# Patient Record
Sex: FEMALE | Race: WHITE | NOT HISPANIC OR LATINO | Employment: FULL TIME | URBAN - METROPOLITAN AREA
[De-identification: names, ages, dates, MRNs, and addresses within clinical notes are randomized per-mention and may not be internally consistent; named-entity substitution may affect disease eponyms.]

---

## 2018-10-08 ENCOUNTER — APPOINTMENT (OUTPATIENT)
Dept: GENERAL RADIOLOGY | Facility: HOSPITAL | Age: 53
End: 2018-10-08

## 2018-10-08 ENCOUNTER — HOSPITAL ENCOUNTER (EMERGENCY)
Facility: HOSPITAL | Age: 53
Discharge: HOME | End: 2018-10-08
Attending: EMERGENCY MEDICINE | Admitting: EMERGENCY MEDICINE

## 2018-10-08 ENCOUNTER — APPOINTMENT (OUTPATIENT)
Dept: CT IMAGING | Facility: HOSPITAL | Age: 53
End: 2018-10-08

## 2018-10-08 VITALS
SYSTOLIC BLOOD PRESSURE: 171 MMHG | HEIGHT: 65 IN | WEIGHT: 155 LBS | OXYGEN SATURATION: 93 % | HEART RATE: 53 BPM | TEMPERATURE: 97.6 F | RESPIRATION RATE: 14 BRPM | BODY MASS INDEX: 25.83 KG/M2 | DIASTOLIC BLOOD PRESSURE: 104 MMHG

## 2018-10-08 DIAGNOSIS — M54.6 ACUTE LEFT-SIDED THORACIC BACK PAIN: Primary | ICD-10-CM

## 2018-10-08 LAB
ALBUMIN SERPL-MCNC: 4.6 G/DL (ref 3.5–5.2)
ALBUMIN/GLOB SERPL: 1.6 G/DL
ALP SERPL-CCNC: 99 U/L (ref 39–117)
ALT SERPL W P-5'-P-CCNC: 40 U/L (ref 1–33)
ANION GAP SERPL CALCULATED.3IONS-SCNC: 14.9 MMOL/L
AST SERPL-CCNC: 29 U/L (ref 1–32)
BASOPHILS # BLD AUTO: 0.08 10*3/MM3 (ref 0–0.2)
BASOPHILS NFR BLD AUTO: 1 % (ref 0–1.5)
BILIRUB SERPL-MCNC: 0.5 MG/DL (ref 0.1–1.2)
BUN BLD-MCNC: 17 MG/DL (ref 6–20)
BUN/CREAT SERPL: 22.1 (ref 7–25)
CALCIUM SPEC-SCNC: 9.7 MG/DL (ref 8.6–10.5)
CHLORIDE SERPL-SCNC: 104 MMOL/L (ref 98–107)
CO2 SERPL-SCNC: 22.1 MMOL/L (ref 22–29)
CREAT BLD-MCNC: 0.77 MG/DL (ref 0.57–1)
D DIMER PPP FEU-MCNC: 2.53 MCGFEU/ML (ref 0–0.49)
DEPRECATED RDW RBC AUTO: 42 FL (ref 37–54)
EOSINOPHIL # BLD AUTO: 0.33 10*3/MM3 (ref 0–0.7)
EOSINOPHIL NFR BLD AUTO: 4.2 % (ref 0.3–6.2)
ERYTHROCYTE [DISTWIDTH] IN BLOOD BY AUTOMATED COUNT: 12.5 % (ref 11.7–13)
GFR SERPL CREATININE-BSD FRML MDRD: 78 ML/MIN/1.73
GLOBULIN UR ELPH-MCNC: 2.8 GM/DL
GLUCOSE BLD-MCNC: 93 MG/DL (ref 65–99)
HCT VFR BLD AUTO: 45.5 % (ref 35.6–45.5)
HGB BLD-MCNC: 15.3 G/DL (ref 11.9–15.5)
HOLD SPECIMEN: NORMAL
HOLD SPECIMEN: NORMAL
IMM GRANULOCYTES # BLD: 0.03 10*3/MM3 (ref 0–0.03)
IMM GRANULOCYTES NFR BLD: 0.4 % (ref 0–0.5)
LYMPHOCYTES # BLD AUTO: 2.68 10*3/MM3 (ref 0.9–4.8)
LYMPHOCYTES NFR BLD AUTO: 34.5 % (ref 19.6–45.3)
MCH RBC QN AUTO: 31 PG (ref 26.9–32)
MCHC RBC AUTO-ENTMCNC: 33.6 G/DL (ref 32.4–36.3)
MCV RBC AUTO: 92.1 FL (ref 80.5–98.2)
MONOCYTES # BLD AUTO: 0.34 10*3/MM3 (ref 0.2–1.2)
MONOCYTES NFR BLD AUTO: 4.4 % (ref 5–12)
NEUTROPHILS # BLD AUTO: 4.34 10*3/MM3 (ref 1.9–8.1)
NEUTROPHILS NFR BLD AUTO: 55.9 % (ref 42.7–76)
PLATELET # BLD AUTO: 207 10*3/MM3 (ref 140–500)
PMV BLD AUTO: 11.1 FL (ref 6–12)
POTASSIUM BLD-SCNC: 4 MMOL/L (ref 3.5–5.2)
PROT SERPL-MCNC: 7.4 G/DL (ref 6–8.5)
RBC # BLD AUTO: 4.94 10*6/MM3 (ref 3.9–5.2)
SODIUM BLD-SCNC: 141 MMOL/L (ref 136–145)
TROPONIN T SERPL-MCNC: <0.01 NG/ML (ref 0–0.03)
WBC NRBC COR # BLD: 7.77 10*3/MM3 (ref 4.5–10.7)
WHOLE BLOOD HOLD SPECIMEN: NORMAL
WHOLE BLOOD HOLD SPECIMEN: NORMAL

## 2018-10-08 PROCEDURE — 96374 THER/PROPH/DIAG INJ IV PUSH: CPT

## 2018-10-08 PROCEDURE — 99283 EMERGENCY DEPT VISIT LOW MDM: CPT

## 2018-10-08 PROCEDURE — 84484 ASSAY OF TROPONIN QUANT: CPT | Performed by: PHYSICIAN ASSISTANT

## 2018-10-08 PROCEDURE — 93010 ELECTROCARDIOGRAM REPORT: CPT | Performed by: INTERNAL MEDICINE

## 2018-10-08 PROCEDURE — 96376 TX/PRO/DX INJ SAME DRUG ADON: CPT

## 2018-10-08 PROCEDURE — 25010000002 ONDANSETRON PER 1 MG: Performed by: EMERGENCY MEDICINE

## 2018-10-08 PROCEDURE — 85379 FIBRIN DEGRADATION QUANT: CPT | Performed by: PHYSICIAN ASSISTANT

## 2018-10-08 PROCEDURE — 25010000002 MORPHINE PER 10 MG: Performed by: EMERGENCY MEDICINE

## 2018-10-08 PROCEDURE — 25010000002 HYDROMORPHONE 1 MG/ML SOLUTION: Performed by: EMERGENCY MEDICINE

## 2018-10-08 PROCEDURE — 80053 COMPREHEN METABOLIC PANEL: CPT | Performed by: PHYSICIAN ASSISTANT

## 2018-10-08 PROCEDURE — 93005 ELECTROCARDIOGRAM TRACING: CPT | Performed by: PHYSICIAN ASSISTANT

## 2018-10-08 PROCEDURE — 0 IOPAMIDOL PER 1 ML: Performed by: EMERGENCY MEDICINE

## 2018-10-08 PROCEDURE — 96375 TX/PRO/DX INJ NEW DRUG ADDON: CPT

## 2018-10-08 PROCEDURE — 85025 COMPLETE CBC W/AUTO DIFF WBC: CPT | Performed by: PHYSICIAN ASSISTANT

## 2018-10-08 PROCEDURE — 71046 X-RAY EXAM CHEST 2 VIEWS: CPT

## 2018-10-08 PROCEDURE — 71275 CT ANGIOGRAPHY CHEST: CPT

## 2018-10-08 RX ORDER — ONDANSETRON 2 MG/ML
4 INJECTION INTRAMUSCULAR; INTRAVENOUS ONCE
Status: COMPLETED | OUTPATIENT
Start: 2018-10-08 | End: 2018-10-08

## 2018-10-08 RX ORDER — BACLOFEN 10 MG/1
10 TABLET ORAL 3 TIMES DAILY PRN
Qty: 30 TABLET | Refills: 0 | Status: SHIPPED | OUTPATIENT
Start: 2018-10-08 | End: 2022-02-17

## 2018-10-08 RX ORDER — HYDROCODONE BITARTRATE AND ACETAMINOPHEN 5; 325 MG/1; MG/1
1 TABLET ORAL EVERY 6 HOURS PRN
Qty: 20 TABLET | Refills: 0 | Status: SHIPPED | OUTPATIENT
Start: 2018-10-08 | End: 2022-02-17

## 2018-10-08 RX ORDER — OXYBUTYNIN CHLORIDE 5 MG/1
5 TABLET ORAL 3 TIMES DAILY
COMMUNITY
End: 2022-02-17

## 2018-10-08 RX ORDER — LISINOPRIL 20 MG/1
20 TABLET ORAL DAILY
COMMUNITY
End: 2022-02-17

## 2018-10-08 RX ADMIN — MORPHINE SULFATE 4 MG: 4 INJECTION INTRAVENOUS at 20:30

## 2018-10-08 RX ADMIN — ONDANSETRON 4 MG: 2 INJECTION INTRAMUSCULAR; INTRAVENOUS at 20:29

## 2018-10-08 RX ADMIN — IOPAMIDOL 95 ML: 755 INJECTION, SOLUTION INTRAVENOUS at 21:02

## 2018-10-08 RX ADMIN — HYDROMORPHONE HYDROCHLORIDE 1 MG: 1 INJECTION, SOLUTION INTRAMUSCULAR; INTRAVENOUS; SUBCUTANEOUS at 21:38

## 2018-10-08 RX ADMIN — ONDANSETRON 4 MG: 2 INJECTION INTRAMUSCULAR; INTRAVENOUS at 21:38

## 2018-10-08 NOTE — ED NOTES
Pt reports SOA and left side pain that radiates up to right shoulder blade. Pt states pain is a constant 8/10 but increases to 10/10. Pt alert and oriented. Family at bedside.     Chiquita Santana RN  10/08/18 5905

## 2018-10-09 NOTE — ED PROVIDER NOTES
" EMERGENCY DEPARTMENT ENCOUNTER    CHIEF COMPLAINT  Chief Complaint: Back pain  History given by: pt  History limited by: none  Room Number: 12/12  PMD: Clem Bush MD      HPI:  Pt is a 53 y.o. female who presents complaining of severe right back pain that has progressed to the right side of the abdomen worsened with breathing that started today around 1100 [place]. Pt reports she has chronic back pain    Duration:  today  Onset: gradual  Timing: constant  Location: right back  Radiation: into right side of abdomen  Quality: \"back pain\"  Intensity/Severity: severe  Progression: unchanged  Associated Symptoms: none  Aggravating Factors: worsened with breathing  Alleviating Factors: none  Previous Episodes: Pt reports she has chronic back pain  Treatment before arrival: none    PAST MEDICAL HISTORY  Active Ambulatory Problems     Diagnosis Date Noted   • No Active Ambulatory Problems     Resolved Ambulatory Problems     Diagnosis Date Noted   • No Resolved Ambulatory Problems     Past Medical History:   Diagnosis Date   • Arthritis    • Hypertension    • PE (pulmonary embolism)    • Pleurisy        PAST SURGICAL HISTORY  Past Surgical History:   Procedure Laterality Date   • CATARACT EXTRACTION     • HYSTERECTOMY     • LASIK     • SINUS SURGERY         FAMILY HISTORY  History reviewed. No pertinent family history.    SOCIAL HISTORY  Social History     Social History   • Marital status:      Spouse name: N/A   • Number of children: N/A   • Years of education: N/A     Occupational History   • Not on file.     Social History Main Topics   • Smoking status: Never Smoker   • Smokeless tobacco: Not on file   • Alcohol use No   • Drug use: No   • Sexual activity: Not on file     Other Topics Concern   • Not on file     Social History Narrative   • No narrative on file       ALLERGIES  Doxycycline and Sulfa antibiotics    REVIEW OF SYSTEMS  Review of Systems "   Constitutional: Negative for fever.   HENT: Negative for sore throat.    Eyes: Negative.    Respiratory: Positive for shortness of breath. Negative for cough.    Cardiovascular: Negative for chest pain.   Gastrointestinal: Negative for abdominal pain, diarrhea and vomiting.   Genitourinary: Negative for dysuria.   Musculoskeletal: Positive for back pain. Negative for neck pain.   Skin: Negative for rash.   Allergic/Immunologic: Negative.    Neurological: Negative for weakness, numbness and headaches.   Hematological: Negative.    Psychiatric/Behavioral: Negative.    All other systems reviewed and are negative.      PHYSICAL EXAM  ED Triage Vitals   Temp Heart Rate Resp BP SpO2   10/08/18 1701 10/08/18 1701 10/08/18 1701 10/08/18 1918 10/08/18 1701   98 °F (36.7 °C) 78 20 (!) 195/116 97 %      Temp src Heart Rate Source Patient Position BP Location FiO2 (%)   10/08/18 1701 10/08/18 1701 -- 10/08/18 1918 --   Tympanic Monitor  Right arm        Physical Exam   Constitutional: She is oriented to person, place, and time. No distress.   HENT:   Head: Normocephalic and atraumatic.   Eyes: Pupils are equal, round, and reactive to light. EOM are normal.   Neck: Normal range of motion. Neck supple.   Cardiovascular: Normal rate, regular rhythm and normal heart sounds.    Pulmonary/Chest: Effort normal and breath sounds normal. No respiratory distress.   Abdominal: Soft. There is no tenderness. There is no rebound and no guarding.   Musculoskeletal: Normal range of motion. She exhibits no edema.   Tenderness that is reproducible with palpation to the left interscapular area and under breast on the left.   Neurological: She is alert and oriented to person, place, and time. She has normal sensation and normal strength.   Skin: Skin is warm and dry. No rash noted.   Psychiatric: Mood and affect normal.   Nursing note and vitals reviewed.      LAB RESULTS  Lab Results (last 24 hours)     Procedure Component Value Units Date/Time     CBC & Differential [[id number]] Collected:  10/08/18 1924    Specimen:  Blood Updated:  10/08/18 1939    Narrative:       The following orders were created for panel order CBC & Differential.  Procedure                               Abnormality         Status                     ---------                               -----------         ------                     CBC Auto Differential[[id number]]        Abnormal            Final result                 Please view results for these tests on the individual orders.    Comprehensive Metabolic Panel [[id number]]  (Abnormal) Collected:  10/08/18 1924    Specimen:  Blood Updated:  10/08/18 1958     Glucose 93 mg/dL      BUN 17 mg/dL      Creatinine 0.77 mg/dL      Sodium 141 mmol/L      Potassium 4.0 mmol/L      Chloride 104 mmol/L      CO2 22.1 mmol/L      Calcium 9.7 mg/dL      Total Protein 7.4 g/dL      Albumin 4.60 g/dL      ALT (SGPT) 40 (H) U/L      AST (SGOT) 29 U/L      Alkaline Phosphatase 99 U/L      Total Bilirubin 0.5 mg/dL      eGFR Non African Amer 78 mL/min/1.73      Globulin 2.8 gm/dL      A/G Ratio 1.6 g/dL      BUN/Creatinine Ratio 22.1     Anion Gap 14.9 mmol/L     Troponin [[id number]]  (Normal) Collected:  10/08/18 1924    Specimen:  Blood Updated:  10/08/18 2003     Troponin T <0.010 ng/mL     Narrative:       Troponin T Reference Ranges:  Less than 0.03 ng/mL:    Negative for AMI  0.03 to 0.09 ng/mL:      Indeterminant for AMI  Greater than 0.09 ng/mL: Positive for AMI    D-dimer, Quantitative [[id number]]  (Abnormal) Collected:  10/08/18 1924    Specimen:  Blood Updated:  10/08/18 1951     D-Dimer, Quantitative 2.53 (H) MCGFEU/mL     Narrative:       The Stago D-Dimer test used in conjunction with a clinical pretest probability (PTP) assessment model, has been approved by the FDA to rule out the presence of venous thromboembolism (VTE) in outpatients suspected of deep venous thrombosis (DVT) or pulmonary embolism (PE).     CBC Auto Differential  "[[id number]]  (Abnormal) Collected:  10/08/18 1924    Specimen:  Blood Updated:  10/08/18 1939     WBC 7.77 10*3/mm3      RBC 4.94 10*6/mm3      Hemoglobin 15.3 g/dL      Hematocrit 45.5 %      MCV 92.1 fL      MCH 31.0 pg      MCHC 33.6 g/dL      RDW 12.5 %      RDW-SD 42.0 fl      MPV 11.1 fL      Platelets 207 10*3/mm3      Neutrophil % 55.9 %      Lymphocyte % 34.5 %      Monocyte % 4.4 (L) %      Eosinophil % 4.2 %      Basophil % 1.0 %      Immature Grans % 0.4 %      Neutrophils, Absolute 4.34 10*3/mm3      Lymphocytes, Absolute 2.68 10*3/mm3      Monocytes, Absolute 0.34 10*3/mm3      Eosinophils, Absolute 0.33 10*3/mm3      Basophils, Absolute 0.08 10*3/mm3      Immature Grans, Absolute 0.03 10*3/mm3           I ordered the above labs and reviewed the results    RADIOLOGY  CT Angiogram Chest With Contrast   Final Result   1. No active disease or pulmonary embolism       This report was finalized on 10/8/2018 9:14 PM by Ted Perez M.D.          XR Chest 2 View   Final Result   Small likely atelectasis or scar peripherally at the left   base. Borderline heart size. Tortuous aorta. Follow-up as clinically   indicated.       This report was finalized on 10/8/2018 7:36 PM by Dr. Mike Huynh M.D.               I ordered the above noted radiological studies. Interpreted by radiologist. Reviewed by me in PACS.       PROCEDURES  Procedures  EKG           EKG time: 1927  Rhythm/Rate: sinus rhythm rate of 54  P waves and FL: normal  QRS, axis: normal   ST and T waves: nonspecific ST changes in the inferior and lateral leads    Interpreted Contemporaneously by me, independently viewed  Similar compared to prior 3/20/2016      PROGRESS AND CONSULTS  ED Course as of Oct 08 2128   Mon Oct 08, 2018   1853 Left pleuritic chest \"spasms\" x 11am today. Hx of post op PE, no longer on Xarelto. Cough this am  [KA]      ED Course User Index  [KA] Mariama Richard PA     2123  Discussed all lab and test results and plan " to discharge with muscle relaxers. Pt understands and agrees with the plan. All questions have been answered.    2123  Ordered dilaudid for pain and zofran for nausea.    MEDICAL DECISION MAKING  Results were reviewed/discussed with the patient and they were also made aware of online access. Pt also made aware that some labs, such as cultures, will not be resulted during ER visit and follow up with PMD is necessary.     MDM  Number of Diagnoses or Management Options     Amount and/or Complexity of Data Reviewed  Clinical lab tests: ordered and reviewed (D Dimer 2.53, Negative troponin)  Tests in the radiology section of CPT®: ordered and reviewed (Chest XR - Small likely atelectasis or scar peripherally at the left base.  Chest CTA - Negative acute)  Tests in the medicine section of CPT®: ordered and reviewed (Refer to the procedure section of the note for EKG results)  Decide to obtain previous medical records or to obtain history from someone other than the patient: yes (EPIC)           DIAGNOSIS  Final diagnoses:   Acute left-sided thoracic back pain       DISPOSITION  DISCHARGE    Patient discharged in stable condition.    Reviewed implications of results, diagnosis, meds, responsibility to follow up, warning signs and symptoms of possible worsening, potential complications and reasons to return to ER.    Patient/Family voiced understanding of above instructions.    Discussed plan for discharge, as there is no emergent indication for admission. Patient referred to primary care provider for BP management due to today's BP. Pt/family is agreeable and understands need for follow up and repeat testing.  Pt is aware that discharge does not mean that nothing is wrong but it indicates no emergency is present that requires admission and they must continue care with follow-up as given below or physician of their choice.     FOLLOW-UP  Clem Bush MD  [address]  [facility]  [address]  [phone]    Call            Medication List      New Prescriptions    baclofen 10 MG tablet  Commonly known as:  LIORESAL  Take 1 tablet by mouth 3 (Three) Times a Day As Needed for Muscle Spasms.     HYDROcodone-acetaminophen 5-325 MG per tablet  Commonly known as:  NORCO  Take 1 tablet by mouth Every 6 (Six) Hours As Needed for Severe Pain .              Latest Documented Vital Signs:  As of 9:28 PM  BP- 179/99 HR- 78 Temp- 98 °F (36.7 °C) (Tympanic) O2 sat- 98%    --  Documentation assistance provided by yuliana Chapman for Dr Willis.  Information recorded by the scribe was done at my direction and has been verified and validated by me.          Glenna Chapman  10/08/18 2120       Evaristo Willis MD  10/17/18 9326

## 2019-01-05 ENCOUNTER — HOSPITAL ENCOUNTER (EMERGENCY)
Facility: HOSPITAL | Age: 54
Discharge: HOME | End: 2019-01-05
Attending: EMERGENCY MEDICINE | Admitting: EMERGENCY MEDICINE

## 2019-01-05 VITALS
SYSTOLIC BLOOD PRESSURE: 151 MMHG | HEART RATE: 65 BPM | DIASTOLIC BLOOD PRESSURE: 89 MMHG | BODY MASS INDEX: 24.91 KG/M2 | TEMPERATURE: 99.2 F | WEIGHT: 155 LBS | HEIGHT: 66 IN | OXYGEN SATURATION: 97 % | RESPIRATION RATE: 16 BRPM

## 2019-01-05 DIAGNOSIS — R19.7 DIARRHEA OF PRESUMED INFECTIOUS ORIGIN: Primary | ICD-10-CM

## 2019-01-05 DIAGNOSIS — E86.0 DEHYDRATION: ICD-10-CM

## 2019-01-05 LAB
ADV 40+41 DNA STL QL NAA+NON-PROBE: NOT DETECTED
ANION GAP SERPL CALCULATED.3IONS-SCNC: 12.1 MMOL/L
ASTRO TYP 1-8 RNA STL QL NAA+NON-PROBE: DETECTED
BASOPHILS # BLD AUTO: 0.07 10*3/MM3 (ref 0–0.2)
BASOPHILS NFR BLD AUTO: 1.1 % (ref 0–1.5)
BUN BLD-MCNC: 25 MG/DL (ref 6–20)
BUN/CREAT SERPL: 26.3 (ref 7–25)
C CAYETANENSIS DNA STL QL NAA+NON-PROBE: NOT DETECTED
C DIFF TOX GENS STL QL NAA+PROBE: NEGATIVE
CALCIUM SPEC-SCNC: 9.6 MG/DL (ref 8.6–10.5)
CAMPY SP DNA.DIARRHEA STL QL NAA+PROBE: NOT DETECTED
CHLORIDE SERPL-SCNC: 108 MMOL/L (ref 98–107)
CO2 SERPL-SCNC: 23.9 MMOL/L (ref 22–29)
CREAT BLD-MCNC: 0.95 MG/DL (ref 0.57–1)
CRYPTOSP STL CULT: NOT DETECTED
DEPRECATED RDW RBC AUTO: 44.2 FL (ref 37–54)
E COLI DNA SPEC QL NAA+PROBE: NOT DETECTED
E HISTOLYT AG STL-ACNC: NOT DETECTED
EAEC PAA PLAS AGGR+AATA ST NAA+NON-PRB: NOT DETECTED
EC STX1 + STX2 GENES STL NAA+PROBE: NOT DETECTED
EOSINOPHIL # BLD AUTO: 0.28 10*3/MM3 (ref 0–0.7)
EOSINOPHIL NFR BLD AUTO: 4.2 % (ref 0.3–6.2)
EPEC EAE GENE STL QL NAA+NON-PROBE: NOT DETECTED
ERYTHROCYTE [DISTWIDTH] IN BLOOD BY AUTOMATED COUNT: 13.2 % (ref 11.7–13)
ETEC LTA+ST1A+ST1B TOX ST NAA+NON-PROBE: NOT DETECTED
G LAMBLIA DNA SPEC QL NAA+PROBE: NOT DETECTED
GFR SERPL CREATININE-BSD FRML MDRD: 61 ML/MIN/1.73
GLUCOSE BLD-MCNC: 97 MG/DL (ref 65–99)
HCT VFR BLD AUTO: 48.5 % (ref 35.6–45.5)
HGB BLD-MCNC: 16.6 G/DL (ref 11.9–15.5)
IMM GRANULOCYTES # BLD AUTO: 0.03 10*3/MM3 (ref 0–0.03)
IMM GRANULOCYTES NFR BLD AUTO: 0.5 % (ref 0–0.5)
LYMPHOCYTES # BLD AUTO: 1.38 10*3/MM3 (ref 0.9–4.8)
LYMPHOCYTES NFR BLD AUTO: 20.8 % (ref 19.6–45.3)
MCH RBC QN AUTO: 31.4 PG (ref 26.9–32)
MCHC RBC AUTO-ENTMCNC: 34.2 G/DL (ref 32.4–36.3)
MCV RBC AUTO: 91.9 FL (ref 80.5–98.2)
MONOCYTES # BLD AUTO: 0.58 10*3/MM3 (ref 0.2–1.2)
MONOCYTES NFR BLD AUTO: 8.7 % (ref 5–12)
NEUTROPHILS # BLD AUTO: 4.32 10*3/MM3 (ref 1.9–8.1)
NEUTROPHILS NFR BLD AUTO: 65.2 % (ref 42.7–76)
NOROVIRUS GI+II RNA STL QL NAA+NON-PROBE: NOT DETECTED
P SHIGELLOIDES DNA STL QL NAA+NON-PROBE: NOT DETECTED
PLATELET # BLD AUTO: 217 10*3/MM3 (ref 140–500)
PMV BLD AUTO: 11.1 FL (ref 6–12)
POTASSIUM BLD-SCNC: 4.7 MMOL/L (ref 3.5–5.2)
RBC # BLD AUTO: 5.28 10*6/MM3 (ref 3.9–5.2)
RV RNA STL NAA+PROBE: NOT DETECTED
SALMONELLA DNA SPEC QL NAA+PROBE: NOT DETECTED
SAPO I+II+IV+V RNA STL QL NAA+NON-PROBE: NOT DETECTED
SHIGELLA SP+EIEC IPAH STL QL NAA+PROBE: NOT DETECTED
SODIUM BLD-SCNC: 144 MMOL/L (ref 136–145)
V CHOLERAE DNA SPEC QL NAA+PROBE: NOT DETECTED
VIBRIO DNA SPEC NAA+PROBE: NOT DETECTED
WBC NRBC COR # BLD: 6.63 10*3/MM3 (ref 4.5–10.7)
YERSINIA STL CULT: NOT DETECTED

## 2019-01-05 PROCEDURE — 99283 EMERGENCY DEPT VISIT LOW MDM: CPT

## 2019-01-05 PROCEDURE — 96360 HYDRATION IV INFUSION INIT: CPT

## 2019-01-05 PROCEDURE — 87493 C DIFF AMPLIFIED PROBE: CPT | Performed by: EMERGENCY MEDICINE

## 2019-01-05 PROCEDURE — 80048 BASIC METABOLIC PNL TOTAL CA: CPT | Performed by: EMERGENCY MEDICINE

## 2019-01-05 PROCEDURE — 85025 COMPLETE CBC W/AUTO DIFF WBC: CPT | Performed by: EMERGENCY MEDICINE

## 2019-01-05 PROCEDURE — 87507 IADNA-DNA/RNA PROBE TQ 12-25: CPT | Performed by: EMERGENCY MEDICINE

## 2019-01-05 RX ORDER — ONDANSETRON 4 MG/1
4 TABLET, ORALLY DISINTEGRATING ORAL EVERY 8 HOURS PRN
Qty: 15 TABLET | Refills: 0 | Status: SHIPPED | OUTPATIENT
Start: 2019-01-05 | End: 2019-01-10

## 2019-01-05 RX ORDER — SODIUM CHLORIDE 0.9 % (FLUSH) 0.9 %
10 SYRINGE (ML) INJECTION AS NEEDED
Status: DISCONTINUED | OUTPATIENT
Start: 2019-01-05 | End: 2019-01-05 | Stop reason: HOSPADM

## 2019-01-05 RX ADMIN — SODIUM CHLORIDE, POTASSIUM CHLORIDE, SODIUM LACTATE AND CALCIUM CHLORIDE 1000 ML: 600; 310; 30; 20 INJECTION, SOLUTION INTRAVENOUS at 10:03

## 2019-01-05 NOTE — ED NOTES
Spoke with lab about pending stool specimen.  Lab called and inquired about whether specimen was indeed stool, they were informed that it indeed was.  Lab told ED staff that the machine rejected the specimen and that current specimen cannot be run, and a recollect needed to be done. Jose Alfredo COBB notified.      Sumeet Cope RN  01/05/19 6811

## 2019-01-05 NOTE — ED PROVIDER NOTES
" EMERGENCY DEPARTMENT ENCOUNTER    Room Number:  01/01  Date seen:  1/5/2019  Time seen: 9:35 AM  PCP: Clem Bush MD  Historian: Pt      HPI:  Chief Complaint: diarrhea    Context: Emy Medina is a 53 y.o. female who presents to the ED c/o diarrhea since 2 days ago. Diarrhea has somewhat improved, but still present. Admits \"low grade\" fever on Thursday, indigestion, and occasional nausea. She denies vomiting. Currently taking Cipro for UTI which she began after diarrhea began on Thursday. Pt was not evaluated for UTI, but was rx called in based on her hx. Pt believed she had UTI because of flank pain, which has since improved. She denies recent exposure to sick people or travel. PMHx: HTN      Pain Location: none  Radiation: none  Quality: loose stool  Intensity/Severity: mild  Duration: 2 days  Onset quality:  sudden  Timing: episodic  Progression: improved  Aggravating Factors: none  Alleviating Factors: none  Previous Episodes: none  Treatment before arrival: began Cipro Thursday  Associated Symptoms: low grade fever, indigestion, nausea, flank pain (has resolved)    PAST MEDICAL HISTORY  Active Ambulatory Problems     Diagnosis Date Noted   • No Active Ambulatory Problems     Resolved Ambulatory Problems     Diagnosis Date Noted   • No Resolved Ambulatory Problems     Past Medical History:   Diagnosis Date   • Arthritis    • Hypertension    • PE (pulmonary embolism)    • Pleurisy          PAST SURGICAL HISTORY  Past Surgical History:   Procedure Laterality Date   • CATARACT EXTRACTION     • HYSTERECTOMY     • LASIK     • SINUS SURGERY           FAMILY HISTORY  History reviewed. No pertinent family history.      SOCIAL HISTORY  Social History     Socioeconomic History   • Marital status:      Spouse name: Not on file   • Number of children: Not on file   • Years of education: Not on file   • Highest education level: Not on file   Social Needs   • Financial resource strain: Not on file   • " "Food insecurity - worry: Not on file   • Food insecurity - inability: Not on file   • Transportation needs - medical: Not on file   • Transportation needs - non-medical: Not on file   Occupational History   • Not on file   Tobacco Use   • Smoking status: Never Smoker   Substance and Sexual Activity   • Alcohol use: No   • Drug use: No   • Sexual activity: Not on file   Other Topics Concern   • Not on file   Social History Narrative   • Not on file         ALLERGIES  Doxycycline and Sulfa antibiotics        REVIEW OF SYSTEMS  Review of Systems   Constitutional: Positive for fever (\"low grade\").   HENT: Negative for sore throat.         Dry mouth   Respiratory: Negative for shortness of breath.    Cardiovascular: Negative for chest pain.   Gastrointestinal: Positive for diarrhea and nausea. Negative for abdominal pain.        Indigestion   Endocrine: Negative for polyuria.   Genitourinary: Positive for flank pain (has resolved since Thursday). Negative for dysuria.   Musculoskeletal: Negative for neck pain.   Skin: Negative for rash.   Neurological: Negative for headaches.   All other systems reviewed and are negative.           PHYSICAL EXAM  ED Triage Vitals [01/05/19 0927]   Temp Heart Rate Resp BP SpO2   99.2 °F (37.3 °C) 73 16 -- 97 %      Temp src Heart Rate Source Patient Position BP Location FiO2 (%)   Tympanic Monitor -- -- --         GENERAL: not distressed  HENT: nares patent, dry mucous membranes  EYES: no scleral icterus  CV: regular rhythm, regular rate  RESPIRATORY: normal effort  ABDOMEN: soft, non tender, no CVA tendreness  MUSCULOSKELETAL: no deformity  NEURO: alert, BO, FC  SKIN: warm, dry    Vital signs and nursing notes reviewed.          LAB RESULTS  Recent Results (from the past 24 hour(s))   Basic Metabolic Panel    Collection Time: 01/05/19 10:03 AM   Result Value Ref Range    Glucose 97 65 - 99 mg/dL    BUN 25 (H) 6 - 20 mg/dL    Creatinine 0.95 0.57 - 1.00 mg/dL    Sodium 144 136 - 145 " mmol/L    Potassium 4.7 3.5 - 5.2 mmol/L    Chloride 108 (H) 98 - 107 mmol/L    CO2 23.9 22.0 - 29.0 mmol/L    Calcium 9.6 8.6 - 10.5 mg/dL    eGFR Non African Amer 61 >60 mL/min/1.73    BUN/Creatinine Ratio 26.3 (H) 7.0 - 25.0    Anion Gap 12.1 mmol/L   CBC Auto Differential    Collection Time: 01/05/19 10:03 AM   Result Value Ref Range    WBC 6.63 4.50 - 10.70 10*3/mm3    RBC 5.28 (H) 3.90 - 5.20 10*6/mm3    Hemoglobin 16.6 (H) 11.9 - 15.5 g/dL    Hematocrit 48.5 (H) 35.6 - 45.5 %    MCV 91.9 80.5 - 98.2 fL    MCH 31.4 26.9 - 32.0 pg    MCHC 34.2 32.4 - 36.3 g/dL    RDW 13.2 (H) 11.7 - 13.0 %    RDW-SD 44.2 37.0 - 54.0 fl    MPV 11.1 6.0 - 12.0 fL    Platelets 217 140 - 500 10*3/mm3    Neutrophil % 65.2 42.7 - 76.0 %    Lymphocyte % 20.8 19.6 - 45.3 %    Monocyte % 8.7 5.0 - 12.0 %    Eosinophil % 4.2 0.3 - 6.2 %    Basophil % 1.1 0.0 - 1.5 %    Immature Grans % 0.5 0.0 - 0.5 %    Neutrophils, Absolute 4.32 1.90 - 8.10 10*3/mm3    Lymphocytes, Absolute 1.38 0.90 - 4.80 10*3/mm3    Monocytes, Absolute 0.58 0.20 - 1.20 10*3/mm3    Eosinophils, Absolute 0.28 0.00 - 0.70 10*3/mm3    Basophils, Absolute 0.07 0.00 - 0.20 10*3/mm3    Immature Grans, Absolute 0.03 0.00 - 0.03 10*3/mm3       Ordered the above labs and reviewed the results.        PROCEDURES  Procedures          MEDICATIONS GIVEN IN ER  Medications   sodium chloride 0.9 % flush 10 mL (not administered)   lactated ringers bolus 1,000 mL (1,000 mL Intravenous New Bag 1/5/19 1003)                   PROGRESS AND CONSULTS      0941 - Ordered labs, UA, stool sample, IVF, and pulse oximetry    0924 - Advised pt of plans to collect stool samples, which will take several hours to result. Stated plans to call her w/ results. Her phone number is: [phone]    1056 - Rechecked pt. She is resting comfortably in NAD. Pt will try PO intake. Pending successful toleration, stated plans to discharge. I will call her w/ stool sample results if needed. All questions  answered. Pt understands and agrees w/ plan.     1142 - Ordered Zofran.    MEDICAL DECISION MAKING      MDM  Number of Diagnoses or Management Options  Dehydration:   Diarrhea of presumed infectious origin:   Diagnosis management comments: Pt with astrovirus. Supportive care. I called patient to alert her of the finding. Gave good RTER precautions.        Amount and/or Complexity of Data Reviewed  Clinical lab tests: ordered and reviewed (+astrovirus)  Decide to obtain previous medical records or to obtain history from someone other than the patient: yes  Review and summarize past medical records: yes               DIAGNOSIS  Final diagnoses:   Diarrhea of presumed infectious origin   Dehydration         DISPOSITION  DISCHARGE    Patient discharged in stable condition.    Reviewed implications of results, diagnosis, meds, responsibility to follow up, warning signs and symptoms of possible worsening, potential complications and reasons to return to ER.    Patient/Family voiced understanding of above instructions.    Discussed plan for discharge, as there is no emergent indication for admission. Patient referred to primary care provider for BP management due to today's BP. Pt/family is agreeable and understands need for follow up and repeat testing.  Pt is aware that discharge does not mean that nothing is wrong but it indicates no emergency is present that requires admission and they must continue care with follow-up as given below or physician of their choice.     FOLLOW-UP  Clem Bush MD  [address]  [facility] [address] [phone]    Schedule an appointment as soon as possible for a visit   As needed         Medication List      New Prescriptions    ondansetron ODT 4 MG disintegrating tablet  Commonly known as:  ZOFRAN-ODT  Take 1 tablet by mouth Every 8 (Eight) Hours As Needed for Nausea or   Vomiting for up to 5 days.        Stop    ondansetron 8 MG tablet  Commonly known as:   ZOFRAN                      Latest Documented Vital Signs:  As of 12:01 PM  BP- 120/71 HR- 73 Temp- 99.2 °F (37.3 °C) (Tympanic) O2 sat- 97%        --  Documentation assistance provided by yuliana Chase for Dr. Jose Alfredo MD.  Information recorded by the scribmingo was done at my direction and has been verified and validated by me.                   Kristi Chase  01/05/19 1205       Saroj Veliz II, MD  01/05/19 7451

## 2019-05-28 ENCOUNTER — LAB REQUISITION (OUTPATIENT)
Dept: LAB | Facility: HOSPITAL | Age: 54
End: 2019-05-28

## 2019-05-28 DIAGNOSIS — N39.0 URINARY TRACT INFECTION: ICD-10-CM

## 2019-05-28 PROCEDURE — 87086 URINE CULTURE/COLONY COUNT: CPT | Performed by: UROLOGY

## 2019-05-29 LAB — BACTERIA SPEC AEROBE CULT: NO GROWTH

## 2022-01-19 NOTE — ED TRIAGE NOTES
Hurts when taking deep breath - feels like she's having spasms   Progress Note - Bryce Rm 60 y o  female MRN: [id number]    Unit/Bed#: [facility] 206-01 Encounter: [id number]      Assessment:  59 y/o F w perforated diverticulitis s/p Ortega's procedure on 1/17  Vitals stable  On 4LNC saturating 96%  Uop: 2400  ADIA (R) 75 cc serosang  ADIA (L) 85 cc serosang  Malecot: 23cc serosang purulent  NGT: 325 bilious    Stoma is maroon and edematous  No output yet  Plan:  Continue npo  Continue ngt  Wait return of ostomy function  Maintenance IVF, 101 cc/h  Continue zosyn, diflucan  Needs aggressive pt/ot  Must sit in chair today  Maintain abdominal drains  Continue malecot drain  Frequent pressure offloading and turning  Subjective:   Feels well  Abdomina pain slowly improving  No chest pain or shortness of breath today  Pulling 750 on IS  Objective:     Vitals: Blood pressure 147/65, pulse (!) 114, temperature 98 9 °F (37 2 °C), temperature source Oral, resp  rate (!) 28, height 5' 7" (1 702 m), weight (!) 170 kg (374 lb 12 5 oz), SpO2 97 %  ,Body mass index is 58 7 kg/m²  Intake/Output Summary (Last 24 hours) at 1/19/2022 0626  Last data filed at 1/19/2022 0532  Gross per 24 hour   Intake 2883 67 ml   Output 2918 ml   Net -34 33 ml       Physical Exam  General: NAD  HEENT: NC/AT  MMM  Cv: RRR  Lungs: normal effort  Ab: Soft, NT/ND  Ostomy is maroon, edematous  Midline is clean and dry  Brian removed and replaced x8 today  Ex: no CCE  Neuro: AAOx3      Invasive Devices  Report    Central Venous Catheter Line            CVC Central Lines 01/17/22 Triple 2 days          Peripheral Intravenous Line            Peripheral IV 01/15/22 Dorsal (posterior); Right Forearm 3 days    Peripheral IV 01/16/22 Left Arm 2 days    Peripheral IV 01/16/22 Right Arm 2 days          Drain            Closed/Suction Drain LLQ Bulb 19 Fr  2 days    Closed/Suction Drain RLQ Bulb 19 Fr  2 days    Colostomy Other (comment) LUQ 2 days    NG/OG/Enteral Tube Nasogastric 18 Fr Right nare 2 days    Rectal Tube Without balloon 2 days    Urethral Catheter Latex 18 Fr  2 days                Lab, Imaging and other studies: I have personally reviewed pertinent reports      VTE Pharmacologic Prophylaxis: Sequential compression device (Venodyne)   VTE Mechanical Prophylaxis: sequential compression device

## 2022-02-17 ENCOUNTER — APPOINTMENT (OUTPATIENT)
Dept: CT IMAGING | Facility: HOSPITAL | Age: 57
End: 2022-02-17

## 2022-02-17 ENCOUNTER — HOSPITAL ENCOUNTER (OUTPATIENT)
Facility: HOSPITAL | Age: 57
Setting detail: OBSERVATION
Discharge: HOME | End: 2022-02-18
Attending: EMERGENCY MEDICINE | Admitting: EMERGENCY MEDICINE

## 2022-02-17 ENCOUNTER — APPOINTMENT (OUTPATIENT)
Dept: GENERAL RADIOLOGY | Facility: HOSPITAL | Age: 57
End: 2022-02-17

## 2022-02-17 DIAGNOSIS — R07.9 CHEST PAIN, UNSPECIFIED TYPE: Primary | ICD-10-CM

## 2022-02-17 DIAGNOSIS — I10 PRIMARY HYPERTENSION: ICD-10-CM

## 2022-02-17 LAB
ALBUMIN SERPL-MCNC: 4.1 G/DL (ref 3.5–5.2)
ALBUMIN/GLOB SERPL: 1.4 G/DL
ALP SERPL-CCNC: 93 U/L (ref 39–117)
ALT SERPL W P-5'-P-CCNC: 41 U/L (ref 1–33)
ANION GAP SERPL CALCULATED.3IONS-SCNC: 13.5 MMOL/L (ref 5–15)
AST SERPL-CCNC: 34 U/L (ref 1–32)
BASOPHILS # BLD AUTO: 0.09 10*3/MM3 (ref 0–0.2)
BASOPHILS NFR BLD AUTO: 1.3 % (ref 0–1.5)
BILIRUB SERPL-MCNC: 0.4 MG/DL (ref 0–1.2)
BUN SERPL-MCNC: 28 MG/DL (ref 6–20)
BUN/CREAT SERPL: 30.4 (ref 7–25)
CALCIUM SPEC-SCNC: 9.2 MG/DL (ref 8.6–10.5)
CHLORIDE SERPL-SCNC: 108 MMOL/L (ref 98–107)
CO2 SERPL-SCNC: 22.5 MMOL/L (ref 22–29)
CREAT SERPL-MCNC: 0.92 MG/DL (ref 0.57–1)
D DIMER PPP FEU-MCNC: 1.25 MCGFEU/ML (ref 0–0.49)
DEPRECATED RDW RBC AUTO: 40.7 FL (ref 37–54)
EOSINOPHIL # BLD AUTO: 0.58 10*3/MM3 (ref 0–0.4)
EOSINOPHIL NFR BLD AUTO: 8.4 % (ref 0.3–6.2)
ERYTHROCYTE [DISTWIDTH] IN BLOOD BY AUTOMATED COUNT: 12.5 % (ref 12.3–15.4)
GFR SERPL CREATININE-BSD FRML MDRD: 63 ML/MIN/1.73
GLOBULIN UR ELPH-MCNC: 3 GM/DL
GLUCOSE SERPL-MCNC: 113 MG/DL (ref 65–99)
HCT VFR BLD AUTO: 42 % (ref 34–46.6)
HGB BLD-MCNC: 14.4 G/DL (ref 12–15.9)
HOLD SPECIMEN: NORMAL
HOLD SPECIMEN: NORMAL
IMM GRANULOCYTES # BLD AUTO: 0.04 10*3/MM3 (ref 0–0.05)
IMM GRANULOCYTES NFR BLD AUTO: 0.6 % (ref 0–0.5)
LYMPHOCYTES # BLD AUTO: 1.97 10*3/MM3 (ref 0.7–3.1)
LYMPHOCYTES NFR BLD AUTO: 28.5 % (ref 19.6–45.3)
MCH RBC QN AUTO: 30.8 PG (ref 26.6–33)
MCHC RBC AUTO-ENTMCNC: 34.3 G/DL (ref 31.5–35.7)
MCV RBC AUTO: 89.7 FL (ref 79–97)
MONOCYTES # BLD AUTO: 0.51 10*3/MM3 (ref 0.1–0.9)
MONOCYTES NFR BLD AUTO: 7.4 % (ref 5–12)
NEUTROPHILS NFR BLD AUTO: 3.73 10*3/MM3 (ref 1.7–7)
NEUTROPHILS NFR BLD AUTO: 53.8 % (ref 42.7–76)
NRBC BLD AUTO-RTO: 0 /100 WBC (ref 0–0.2)
PLATELET # BLD AUTO: 198 10*3/MM3 (ref 140–450)
PMV BLD AUTO: 11.2 FL (ref 6–12)
POTASSIUM SERPL-SCNC: 3.7 MMOL/L (ref 3.5–5.2)
PROT SERPL-MCNC: 7.1 G/DL (ref 6–8.5)
QT INTERVAL: 447 MS
RBC # BLD AUTO: 4.68 10*6/MM3 (ref 3.77–5.28)
SARS-COV-2 RNA RESP QL NAA+PROBE: NOT DETECTED
SODIUM SERPL-SCNC: 144 MMOL/L (ref 136–145)
TROPONIN T SERPL-MCNC: <0.01 NG/ML (ref 0–0.03)
TROPONIN T SERPL-MCNC: <0.01 NG/ML (ref 0–0.03)
WBC NRBC COR # BLD: 6.92 10*3/MM3 (ref 3.4–10.8)
WHOLE BLOOD HOLD SPECIMEN: NORMAL
WHOLE BLOOD HOLD SPECIMEN: NORMAL

## 2022-02-17 PROCEDURE — C9803 HOPD COVID-19 SPEC COLLECT: HCPCS

## 2022-02-17 PROCEDURE — 99284 EMERGENCY DEPT VISIT MOD MDM: CPT

## 2022-02-17 PROCEDURE — 85025 COMPLETE CBC W/AUTO DIFF WBC: CPT | Performed by: EMERGENCY MEDICINE

## 2022-02-17 PROCEDURE — U0003 INFECTIOUS AGENT DETECTION BY NUCLEIC ACID (DNA OR RNA); SEVERE ACUTE RESPIRATORY SYNDROME CORONAVIRUS 2 (SARS-COV-2) (CORONAVIRUS DISEASE [COVID-19]), AMPLIFIED PROBE TECHNIQUE, MAKING USE OF HIGH THROUGHPUT TECHNOLOGIES AS DESCRIBED BY CMS-2020-01-R: HCPCS | Performed by: EMERGENCY MEDICINE

## 2022-02-17 PROCEDURE — 93005 ELECTROCARDIOGRAM TRACING: CPT | Performed by: EMERGENCY MEDICINE

## 2022-02-17 PROCEDURE — G0378 HOSPITAL OBSERVATION PER HR: HCPCS

## 2022-02-17 PROCEDURE — 85379 FIBRIN DEGRADATION QUANT: CPT | Performed by: EMERGENCY MEDICINE

## 2022-02-17 PROCEDURE — 93010 ELECTROCARDIOGRAM REPORT: CPT | Performed by: INTERNAL MEDICINE

## 2022-02-17 PROCEDURE — 71275 CT ANGIOGRAPHY CHEST: CPT

## 2022-02-17 PROCEDURE — 93005 ELECTROCARDIOGRAM TRACING: CPT

## 2022-02-17 PROCEDURE — 80053 COMPREHEN METABOLIC PANEL: CPT | Performed by: EMERGENCY MEDICINE

## 2022-02-17 PROCEDURE — 96374 THER/PROPH/DIAG INJ IV PUSH: CPT

## 2022-02-17 PROCEDURE — 0 IOPAMIDOL PER 1 ML: Performed by: EMERGENCY MEDICINE

## 2022-02-17 PROCEDURE — 71045 X-RAY EXAM CHEST 1 VIEW: CPT

## 2022-02-17 PROCEDURE — 84484 ASSAY OF TROPONIN QUANT: CPT | Performed by: EMERGENCY MEDICINE

## 2022-02-17 PROCEDURE — 96375 TX/PRO/DX INJ NEW DRUG ADDON: CPT

## 2022-02-17 PROCEDURE — 25010000002 KETOROLAC TROMETHAMINE PER 15 MG: Performed by: EMERGENCY MEDICINE

## 2022-02-17 RX ORDER — ASPIRIN 81 MG/1
324 TABLET, CHEWABLE ORAL ONCE
Status: COMPLETED | OUTPATIENT
Start: 2022-02-17 | End: 2022-02-17

## 2022-02-17 RX ORDER — KETOROLAC TROMETHAMINE 15 MG/ML
15 INJECTION, SOLUTION INTRAMUSCULAR; INTRAVENOUS ONCE
Status: COMPLETED | OUTPATIENT
Start: 2022-02-17 | End: 2022-02-17

## 2022-02-17 RX ORDER — SODIUM CHLORIDE, SODIUM LACTATE, POTASSIUM CHLORIDE, CALCIUM CHLORIDE 600; 310; 30; 20 MG/100ML; MG/100ML; MG/100ML; MG/100ML
100 INJECTION, SOLUTION INTRAVENOUS CONTINUOUS
Status: DISCONTINUED | OUTPATIENT
Start: 2022-02-18 | End: 2022-02-17

## 2022-02-17 RX ORDER — SODIUM CHLORIDE 0.9 % (FLUSH) 0.9 %
10 SYRINGE (ML) INJECTION EVERY 12 HOURS SCHEDULED
Status: DISCONTINUED | OUTPATIENT
Start: 2022-02-17 | End: 2022-02-18 | Stop reason: HOSPADM

## 2022-02-17 RX ORDER — NITROGLYCERIN 0.4 MG/1
0.4 TABLET SUBLINGUAL
Status: DISCONTINUED | OUTPATIENT
Start: 2022-02-17 | End: 2022-02-18 | Stop reason: HOSPADM

## 2022-02-17 RX ORDER — FLUTICASONE PROPIONATE 50 MCG
SPRAY, SUSPENSION (ML) NASAL EVERY 24 HOURS
Status: ON HOLD | COMMUNITY
Start: 2021-08-29 | End: 2022-08-02

## 2022-02-17 RX ORDER — SODIUM CHLORIDE 0.9 % (FLUSH) 0.9 %
10 SYRINGE (ML) INJECTION AS NEEDED
Status: DISCONTINUED | OUTPATIENT
Start: 2022-02-17 | End: 2022-02-18 | Stop reason: HOSPADM

## 2022-02-17 RX ORDER — CHOLECALCIFEROL (VITAMIN D3) 125 MCG
5 CAPSULE ORAL NIGHTLY PRN
Status: DISCONTINUED | OUTPATIENT
Start: 2022-02-17 | End: 2022-02-18 | Stop reason: HOSPADM

## 2022-02-17 RX ORDER — LABETALOL HYDROCHLORIDE 5 MG/ML
5 INJECTION, SOLUTION INTRAVENOUS ONCE AS NEEDED
Status: COMPLETED | OUTPATIENT
Start: 2022-02-17 | End: 2022-02-17

## 2022-02-17 RX ORDER — MELOXICAM 15 MG/1
TABLET ORAL NIGHTLY
COMMUNITY
End: 2022-08-04 | Stop reason: HOSPADM

## 2022-02-17 RX ORDER — VALSARTAN 320 MG/1
320 TABLET ORAL ONCE
Status: COMPLETED | OUTPATIENT
Start: 2022-02-17 | End: 2022-02-17

## 2022-02-17 RX ORDER — ONDANSETRON 4 MG/1
4 TABLET, FILM COATED ORAL EVERY 6 HOURS PRN
Status: DISCONTINUED | OUTPATIENT
Start: 2022-02-17 | End: 2022-02-18 | Stop reason: HOSPADM

## 2022-02-17 RX ORDER — IBUPROFEN 800 MG
TABLET ORAL EVERY 24 HOURS
COMMUNITY

## 2022-02-17 RX ORDER — VALSARTAN 320 MG/1
TABLET ORAL DAILY
COMMUNITY
End: 2022-08-04 | Stop reason: HOSPADM

## 2022-02-17 RX ORDER — LORATADINE 10 MG/1
CAPSULE, LIQUID FILLED ORAL EVERY 24 HOURS
COMMUNITY
Start: 2021-08-29 | End: 2022-04-05

## 2022-02-17 RX ORDER — ONDANSETRON 2 MG/ML
4 INJECTION INTRAMUSCULAR; INTRAVENOUS EVERY 6 HOURS PRN
Status: DISCONTINUED | OUTPATIENT
Start: 2022-02-17 | End: 2022-02-18 | Stop reason: HOSPADM

## 2022-02-17 RX ORDER — ACETAMINOPHEN 325 MG/1
650 TABLET ORAL EVERY 4 HOURS PRN
Status: DISCONTINUED | OUTPATIENT
Start: 2022-02-17 | End: 2022-02-18 | Stop reason: HOSPADM

## 2022-02-17 RX ADMIN — ASPIRIN 324 MG: 81 TABLET, CHEWABLE ORAL at 21:02

## 2022-02-17 RX ADMIN — IOPAMIDOL 95 ML: 755 INJECTION, SOLUTION INTRAVENOUS at 19:50

## 2022-02-17 RX ADMIN — VALSARTAN 320 MG: 320 TABLET, FILM COATED ORAL at 20:53

## 2022-02-17 RX ADMIN — LABETALOL HYDROCHLORIDE 5 MG: 5 INJECTION, SOLUTION INTRAVENOUS at 22:18

## 2022-02-17 RX ADMIN — SODIUM CHLORIDE, PRESERVATIVE FREE 10 ML: 5 INJECTION INTRAVENOUS at 22:19

## 2022-02-17 RX ADMIN — SODIUM CHLORIDE, PRESERVATIVE FREE 10 ML: 5 INJECTION INTRAVENOUS at 19:01

## 2022-02-17 RX ADMIN — KETOROLAC TROMETHAMINE 15 MG: 15 INJECTION, SOLUTION INTRAMUSCULAR; INTRAVENOUS at 19:00

## 2022-02-17 NOTE — ED NOTES
Patient states at 2PM she started having chest pain that is progressively getting worse. States movement makes the pain worse. Hx of pleurisy and PE.     Danica Ndiaye RN  02/17/22 4217

## 2022-02-17 NOTE — ED PROVIDER NOTES
EMERGENCY DEPARTMENT ENCOUNTER    Room Number:  113/1  Date of encounter:  2/17/2022  PCP: Clem Bush MD  Historian: Patient and spouse      HPI:  Chief Complaint: Chest pain  A complete HPI/ROS/PMH/PSH/SH/FH are unobtainable due to: Not applicable  Context: Emy Medina is a 56 y.o. female who presents to the ED c/o patient works at the [employer] which is a very physical job lifting twisting bending.  She was working today and started getting some pain at about 145 or 2 PM.  It is right in the center of her chest right between her breast.  He states it feels similar to her previous pleurisy.  It is sharp pain.  It is worse with moving especially leaning forward.  She does not feel that it is worse with deep breathing.  She does not have any shortness of breath.  It is been fairly constant since it started.  There is been no radiation to the pain.  She has had no nausea or vomiting.  She has had no coughs or colds.  She has no history of heart problems that she is aware of.  Unaware if she is ever had any form of stress test.  She is had a history of pleurisy.  She is also had a history of a pulmonary embolism in 2016 after surgery.  She was on blood thinning medicines but she was instructed that she no longer needs them.  She complains of no pain in her lower extremities or swelling to her lower extremities.  She has had no recent immobilizations or surgeries.  That one time in 2016 was the only time that she had a blood clot.  When she sits still and does not move at about 45 to 60 degrees she has no pain in her chest.        Previous Episodes: Yes feels like pleurisy in the past  Current Symptoms: See above    MEDICAL HISTORY REVIEWED        PAST MEDICAL HISTORY  Active Ambulatory Problems     Diagnosis Date Noted   • No Active Ambulatory Problems     Resolved Ambulatory Problems     Diagnosis Date Noted   • No Resolved Ambulatory Problems     Past Medical History:   Diagnosis Date   • Arthritis     • Hypertension    • PE (pulmonary embolism)    • Pleurisy          PAST SURGICAL HISTORY  Past Surgical History:   Procedure Laterality Date   • CATARACT EXTRACTION     • HYSTERECTOMY     • LASIK     • SINUS SURGERY           FAMILY HISTORY  History reviewed. No pertinent family history.      SOCIAL HISTORY  Social History     Socioeconomic History   • Marital status:    Tobacco Use   • Smoking status: Never Smoker   Substance and Sexual Activity   • Alcohol use: No   • Drug use: No         ALLERGIES  Doxycycline and Sulfa antibiotics        REVIEW OF SYSTEMS  Review of Systems     All systems reviewed and negative except for those discussed in HPI.       PHYSICAL EXAM    I have reviewed the triage vital signs and nursing notes.    ED Triage Vitals   Temp Heart Rate Resp BP SpO2   02/17/22 1705 02/17/22 1705 02/17/22 1705 02/17/22 1800 02/17/22 1705   98 °F (36.7 °C) 68 18 (!) 164/112 97 %      Temp src Heart Rate Source Patient Position BP Location FiO2 (%)   -- -- 02/17/22 1800 02/17/22 1800 --     Lying Right arm        GENERAL: Female no acute distress.Vital signs on my initial evaluation unremarkable.  O2 sat is 97% on room air.  Normal heart rate normal blood pressure  HENT: nares patent  Head/neck/ face are symmetric without gross deformity, signs of trauma, or swelling  EYES: no scleral icterus, no conjunctival pallor.  NECK: Supple, no meningismus  CV: regular rhythm, regular rate with intact distal pulses.  No murmur or rub.  Pain is reproducible when she leans forward.  It is right in the midportion of her sternum.  It is not reproducible with deep breathing or palpation.  Normal inspection  RESPIRATORY: normal effort and no respiratory distress.  Clear to auscultation bilaterally  ABDOMEN: soft and nontender.  MUSCULOSKELETAL: no deformity.  No edema or swelling.  Good strong intact distal pulses to upper and lower extremities that are equal strong and symmetric.  NEURO: alert and appropriate,  moves all extremities, follows commands.  No focal motor or sensory changes.  SKIN: warm, dry    Vital signs and nursing notes reviewed.        LAB RESULTS  Recent Results (from the past 24 hour(s))   ECG 12 Lead    Collection Time: 02/17/22  5:11 PM   Result Value Ref Range    QT Interval 447 ms   Green Top (Gel)    Collection Time: 02/17/22  6:08 PM   Result Value Ref Range    Extra Tube Hold for add-ons.    Lavender Top    Collection Time: 02/17/22  6:08 PM   Result Value Ref Range    Extra Tube hold for add-on    Gold Top - SST    Collection Time: 02/17/22  6:08 PM   Result Value Ref Range    Extra Tube Hold for add-ons.    Light Blue Top    Collection Time: 02/17/22  6:08 PM   Result Value Ref Range    Extra Tube hold for add-on    Comprehensive Metabolic Panel    Collection Time: 02/17/22  6:08 PM    Specimen: Blood   Result Value Ref Range    Glucose 113 (H) 65 - 99 mg/dL    BUN 28 (H) 6 - 20 mg/dL    Creatinine 0.92 0.57 - 1.00 mg/dL    Sodium 144 136 - 145 mmol/L    Potassium 3.7 3.5 - 5.2 mmol/L    Chloride 108 (H) 98 - 107 mmol/L    CO2 22.5 22.0 - 29.0 mmol/L    Calcium 9.2 8.6 - 10.5 mg/dL    Total Protein 7.1 6.0 - 8.5 g/dL    Albumin 4.10 3.50 - 5.20 g/dL    ALT (SGPT) 41 (H) 1 - 33 U/L    AST (SGOT) 34 (H) 1 - 32 U/L    Alkaline Phosphatase 93 39 - 117 U/L    Total Bilirubin 0.4 0.0 - 1.2 mg/dL    eGFR Non African Amer 63 >60 mL/min/1.73    Globulin 3.0 gm/dL    A/G Ratio 1.4 g/dL    BUN/Creatinine Ratio 30.4 (H) 7.0 - 25.0    Anion Gap 13.5 5.0 - 15.0 mmol/L   Troponin    Collection Time: 02/17/22  6:08 PM    Specimen: Blood   Result Value Ref Range    Troponin T <0.010 0.000 - 0.030 ng/mL   D-dimer, Quantitative    Collection Time: 02/17/22  6:08 PM    Specimen: Blood   Result Value Ref Range    D-Dimer, Quantitative 1.25 (H) 0.00 - 0.49 MCGFEU/mL   CBC Auto Differential    Collection Time: 02/17/22  6:08 PM    Specimen: Blood   Result Value Ref Range    WBC 6.92 3.40 - 10.80 10*3/mm3    RBC 4.68  3.77 - 5.28 10*6/mm3    Hemoglobin 14.4 12.0 - 15.9 g/dL    Hematocrit 42.0 34.0 - 46.6 %    MCV 89.7 79.0 - 97.0 fL    MCH 30.8 26.6 - 33.0 pg    MCHC 34.3 31.5 - 35.7 g/dL    RDW 12.5 12.3 - 15.4 %    RDW-SD 40.7 37.0 - 54.0 fl    MPV 11.2 6.0 - 12.0 fL    Platelets 198 140 - 450 10*3/mm3    Neutrophil % 53.8 42.7 - 76.0 %    Lymphocyte % 28.5 19.6 - 45.3 %    Monocyte % 7.4 5.0 - 12.0 %    Eosinophil % 8.4 (H) 0.3 - 6.2 %    Basophil % 1.3 0.0 - 1.5 %    Immature Grans % 0.6 (H) 0.0 - 0.5 %    Neutrophils, Absolute 3.73 1.70 - 7.00 10*3/mm3    Lymphocytes, Absolute 1.97 0.70 - 3.10 10*3/mm3    Monocytes, Absolute 0.51 0.10 - 0.90 10*3/mm3    Eosinophils, Absolute 0.58 (H) 0.00 - 0.40 10*3/mm3    Basophils, Absolute 0.09 0.00 - 0.20 10*3/mm3    Immature Grans, Absolute 0.04 0.00 - 0.05 10*3/mm3    nRBC 0.0 0.0 - 0.2 /100 WBC   Troponin    Collection Time: 02/17/22  8:39 PM    Specimen: Blood   Result Value Ref Range    Troponin T <0.010 0.000 - 0.030 ng/mL   COVID-19, [facility] IN-HOUSE CEPHEID/[facility] NP SWAB IN TRANSPORT MEDIA 8-12 HR TAT - Swab, Nasopharynx    Collection Time: 02/17/22  8:41 PM    Specimen: Nasopharynx; Swab   Result Value Ref Range    COVID19 Not Detected Not Detected - Ref. Range       Ordered the above labs and independently reviewed the results.        RADIOLOGY  XR Chest 1 View    Result Date: 2/17/2022  ONE VIEW PORTABLE CHEST  HISTORY: Chest pain.  FINDINGS: The lungs are well-expanded and clear and the heart and hilar structures are normal. There is no acute disease or change from 10/08/2018.  This report was finalized on 2/17/2022 6:51 PM by Dr. Rikki Alba M.D.      CT Angiogram Chest    Result Date: 2/17/2022  CT ANGIOGRAPHY OF THE CHEST WITH INTRAVENOUS CONTRAST AND 3-D RECONSTRUCTIONS  HISTORY: Shortness of breath. Chest pain.  The CT scan was performed as an emergency procedure with CT angiography protocol using intravenous contrast and 3-D reconstructions.  FINDINGS: The following  findings are present: 1. The pulmonary arteries are well-opacified and there is no evidence of pulmonary embolus. The thoracic aorta shows no evidence of aneurysm or dissection. 2. The lungs are well-expanded with some chronic interstitial scarring at the bases similar to the study of 10/08/2018. There is no evidence of acute pneumonia. There are no pleural effusions. 3. There is no mediastinal or hilar or axillary adenopathy. There is no pericardial effusion. The CT images through the upper liver, spleen, both adrenal glands, and upper poles of both kidneys are unremarkable.      Radiation dose reduction techniques were utilized, including automated exposure control and exposure modulation based on body size.  This report was finalized on 2/17/2022 8:29 PM by Dr. Rikki Alba M.D.        I ordered the above noted radiological studies. Reviewed by me and discussed with radiologist.  See dictation for official radiology interpretation.      PROCEDURES    Procedures      MEDICATIONS GIVEN IN ER    Medications   sodium chloride 0.9 % flush 10 mL (10 mL Intravenous Given 2/17/22 1901)   sodium chloride 0.9 % flush 10 mL (10 mL Intravenous Given 2/17/22 1901)   nitroglycerin (NITROSTAT) SL tablet 0.4 mg (has no administration in time range)   sodium chloride 0.9 % flush 10 mL (10 mL Intravenous Given 2/17/22 2219)   sodium chloride 0.9 % flush 10 mL (has no administration in time range)   ondansetron (ZOFRAN) tablet 4 mg (has no administration in time range)     Or   ondansetron (ZOFRAN) injection 4 mg (has no administration in time range)   lactated ringers infusion (has no administration in time range)   acetaminophen (TYLENOL) tablet 650 mg (has no administration in time range)   melatonin tablet 5 mg (has no administration in time range)   ketorolac (TORADOL) injection 15 mg (15 mg Intravenous Given 2/17/22 1900)   iopamidol (ISOVUE-370) 76 % injection 95 mL (95 mL Intravenous Given 2/17/22 1950)   valsartan  (DIOVAN) tablet 320 mg (320 mg Oral Given 2/17/22 2053)   aspirin chewable tablet 324 mg (324 mg Oral Given 2/17/22 2102)   labetalol (NORMODYNE,TRANDATE) injection 5 mg (5 mg Intravenous Given 2/17/22 2218)         PROGRESS, DATA ANALYSIS, CONSULTS, AND MEDICAL DECISION MAKING    Informed patient and spouse of the test that we will order.  I will check a CT angiogram of her chest given her past history.  We will also give her Toradol.  She is very stable in no distress at this time.  All questions answered.    We are currently under a pandemic from the COVID19 infection.  The patient presented to the emergency department by ambulance or personal vehicle. I followed the current protocols required by Infection Control at [facility] in my evaluation and treatment of the patient. The patient was wearing a face mask during my evaluation and throughout my encounter. During my whole encounter with this patient I used appropriate personal protective equipment.  This equipment consisted of eye protection, facemask, gown, and gloves.  I applied this equipment before entering the room.      All labs have been independently reviewed by me.  All radiology studies have been reviewed by me and discussed with radiologist dictating the report.   EKG's independently viewed and interpreted by me.  Discussion below represents my analysis of pertinent findings related to patient's condition, differential diagnosis, treatment plan and final disposition.      ED Course as of 02/17/22 2232   Thu Feb 17, 2022   1739 EKG readingEKG was done at 1711It is a rate of 63 it is sinus rhythm with a PVCThere is some intraventricular conduction delayNormal axis there is LVH there is signs of Q waves in three and aVF and also V1 and V2 and some other nonspecific changes with some nonspecific T wave changes in several leadsQT looks normalI compared to the previous EKG from October 18, 2018 and it looks very similar. [MM]   1911 D-Dimer,  Quant(!): 1.25 [MM]   1911 Chest x-ray is unremarkable.  Also reviewed the radiologist report. [MM]   2027 I discussed the results of the CT angiogram with the chest with the radiologist Dr. Alba.  No acute pulmonary embolism.  No acute process appreciated.  Please see complete dictated report from the radiologist. [MM]   2048 Spoke with the patient and spouse at length.  I feel is prudent given her age and risk factors for her to be placed in observation and repeat troponin.  Both of them agree.  All questions answered at this time. [MM]   2050 I spoke with Kendy who is the midlevel provider in the observation unit.  Explained to her the patient's initial presenting symptoms and results of test.  She agrees to accept the patient to the observation unit. [MM]   2050 At this particular time patient is pain-free on my reevaluation.  Has no shortness of breath and is asymptomatic.  Blood pressure is elevated.  She has not taken her evening dose of valsartan.  I went ahead and gave her her evening dose of 320 mg.  I gave her her own supply of medicine that she had with her. [MM]      ED Course User Index  [MM] Zach Salazar MD       AS OF 22:32 EST VITALS:    BP - (!) 212/96  HR - 59  TEMP - 97.7 °F (36.5 °C) (Oral)  02 SATS - 97%        DIAGNOSIS  Final diagnoses:   Chest pain, unspecified type   Primary hypertension         DISPOSITION  Patient will be admitted to a monitored bed to the observation unit.          DICTATED UTILIZING DRAGON DICTATION     Zach Salazar MD  02/17/22 4165

## 2022-02-18 ENCOUNTER — APPOINTMENT (OUTPATIENT)
Dept: CARDIOLOGY | Facility: HOSPITAL | Age: 57
End: 2022-02-18

## 2022-02-18 VITALS
OXYGEN SATURATION: 100 % | HEART RATE: 93 BPM | DIASTOLIC BLOOD PRESSURE: 89 MMHG | HEIGHT: 66 IN | RESPIRATION RATE: 16 BRPM | BODY MASS INDEX: 25.39 KG/M2 | SYSTOLIC BLOOD PRESSURE: 161 MMHG | WEIGHT: 158 LBS | TEMPERATURE: 97.8 F

## 2022-02-18 LAB
ANION GAP SERPL CALCULATED.3IONS-SCNC: 11 MMOL/L (ref 5–15)
AORTIC DIMENSIONLESS INDEX: 0.7 (DI)
BH CV ECHO MEAS - AO MAX PG (FULL): 3.2 MMHG
BH CV ECHO MEAS - AO MAX PG: 9.8 MMHG
BH CV ECHO MEAS - AO MEAN PG (FULL): 2.1 MMHG
BH CV ECHO MEAS - AO MEAN PG: 5.1 MMHG
BH CV ECHO MEAS - AO ROOT AREA (BSA CORRECTED): 1.8
BH CV ECHO MEAS - AO ROOT AREA: 8.1 CM^2
BH CV ECHO MEAS - AO ROOT DIAM: 3.2 CM
BH CV ECHO MEAS - AO V2 MAX: 156.4 CM/SEC
BH CV ECHO MEAS - AO V2 MEAN: 107.2 CM/SEC
BH CV ECHO MEAS - AO V2 VTI: 38.2 CM
BH CV ECHO MEAS - AVA(I,A): 2.5 CM^2
BH CV ECHO MEAS - AVA(I,D): 2.5 CM^2
BH CV ECHO MEAS - AVA(V,A): 2.9 CM^2
BH CV ECHO MEAS - AVA(V,D): 2.9 CM^2
BH CV ECHO MEAS - BSA(HAYCOCK): 1.8 M^2
BH CV ECHO MEAS - BSA: 1.8 M^2
BH CV ECHO MEAS - BZI_BMI: 25.5 KILOGRAMS/M^2
BH CV ECHO MEAS - BZI_METRIC_HEIGHT: 167.6 CM
BH CV ECHO MEAS - BZI_METRIC_WEIGHT: 71.7 KG
BH CV ECHO MEAS - EDV(CUBED): 199.5 ML
BH CV ECHO MEAS - EDV(MOD-SP2): 129 ML
BH CV ECHO MEAS - EDV(MOD-SP4): 137 ML
BH CV ECHO MEAS - EDV(TEICH): 169.4 ML
BH CV ECHO MEAS - EF(CUBED): 62.4 %
BH CV ECHO MEAS - EF(MOD-BP): 62.3 %
BH CV ECHO MEAS - EF(MOD-SP2): 62.8 %
BH CV ECHO MEAS - EF(MOD-SP4): 62.8 %
BH CV ECHO MEAS - EF(TEICH): 53.2 %
BH CV ECHO MEAS - ESV(CUBED): 74.9 ML
BH CV ECHO MEAS - ESV(MOD-SP2): 48 ML
BH CV ECHO MEAS - ESV(MOD-SP4): 51 ML
BH CV ECHO MEAS - ESV(TEICH): 79.3 ML
BH CV ECHO MEAS - FS: 27.8 %
BH CV ECHO MEAS - IVS/LVPW: 1.1
BH CV ECHO MEAS - IVSD: 0.93 CM
BH CV ECHO MEAS - LAT PEAK E' VEL: 10 CM/SEC
BH CV ECHO MEAS - LV DIASTOLIC VOL/BSA (35-75): 75.7 ML/M^2
BH CV ECHO MEAS - LV MASS(C)D: 202.9 GRAMS
BH CV ECHO MEAS - LV MASS(C)DI: 112.1 GRAMS/M^2
BH CV ECHO MEAS - LV MAX PG: 6.5 MMHG
BH CV ECHO MEAS - LV MEAN PG: 3 MMHG
BH CV ECHO MEAS - LV SYSTOLIC VOL/BSA (12-30): 28.2 ML/M^2
BH CV ECHO MEAS - LV V1 MAX: 127.8 CM/SEC
BH CV ECHO MEAS - LV V1 MEAN: 79.5 CM/SEC
BH CV ECHO MEAS - LV V1 VTI: 27.3 CM
BH CV ECHO MEAS - LVIDD: 5.8 CM
BH CV ECHO MEAS - LVIDS: 4.2 CM
BH CV ECHO MEAS - LVLD AP2: 8.8 CM
BH CV ECHO MEAS - LVLD AP4: 9.2 CM
BH CV ECHO MEAS - LVLS AP2: 6.7 CM
BH CV ECHO MEAS - LVLS AP4: 7.2 CM
BH CV ECHO MEAS - LVOT AREA (M): 3.5 CM^2
BH CV ECHO MEAS - LVOT AREA: 3.5 CM^2
BH CV ECHO MEAS - LVOT DIAM: 2.1 CM
BH CV ECHO MEAS - LVPWD: 0.84 CM
BH CV ECHO MEAS - MED PEAK E' VEL: 6.4 CM/SEC
BH CV ECHO MEAS - MR MAX PG: 101.2 MMHG
BH CV ECHO MEAS - MR MAX VEL: 502.9 CM/SEC
BH CV ECHO MEAS - MV A DUR: 0.1 SEC
BH CV ECHO MEAS - MV A MAX VEL: 76.7 CM/SEC
BH CV ECHO MEAS - MV DEC SLOPE: 400.1 CM/SEC^2
BH CV ECHO MEAS - MV DEC TIME: 226 SEC
BH CV ECHO MEAS - MV E MAX VEL: 83.6 CM/SEC
BH CV ECHO MEAS - MV E/A: 1.1
BH CV ECHO MEAS - MV MAX PG: 4 MMHG
BH CV ECHO MEAS - MV MEAN PG: 1.4 MMHG
BH CV ECHO MEAS - MV P1/2T MAX VEL: 98.8 CM/SEC
BH CV ECHO MEAS - MV P1/2T: 72.3 MSEC
BH CV ECHO MEAS - MV V2 MAX: 99.9 CM/SEC
BH CV ECHO MEAS - MV V2 MEAN: 57 CM/SEC
BH CV ECHO MEAS - MV V2 VTI: 40.5 CM
BH CV ECHO MEAS - MVA P1/2T LCG: 2.2 CM^2
BH CV ECHO MEAS - MVA(P1/2T): 3 CM^2
BH CV ECHO MEAS - MVA(VTI): 2.4 CM^2
BH CV ECHO MEAS - PA ACC TIME: 0.08 SEC
BH CV ECHO MEAS - PA MAX PG (FULL): 4.4 MMHG
BH CV ECHO MEAS - PA MAX PG: 5.1 MMHG
BH CV ECHO MEAS - PA PR(ACCEL): 43.3 MMHG
BH CV ECHO MEAS - PA V2 MAX: 113.4 CM/SEC
BH CV ECHO MEAS - PI END-D VEL: 72.7 CM/SEC
BH CV ECHO MEAS - RAP SYSTOLE: 8 MMHG
BH CV ECHO MEAS - RV MAX PG: 0.76 MMHG
BH CV ECHO MEAS - RV MEAN PG: 0.45 MMHG
BH CV ECHO MEAS - RV V1 MAX: 43.7 CM/SEC
BH CV ECHO MEAS - RV V1 MEAN: 32 CM/SEC
BH CV ECHO MEAS - RV V1 VTI: 10.9 CM
BH CV ECHO MEAS - RVSP: 27.5 MMHG
BH CV ECHO MEAS - SI(AO): 171.4 ML/M^2
BH CV ECHO MEAS - SI(CUBED): 68.8 ML/M^2
BH CV ECHO MEAS - SI(LVOT): 53.4 ML/M^2
BH CV ECHO MEAS - SI(MOD-SP2): 44.8 ML/M^2
BH CV ECHO MEAS - SI(MOD-SP4): 47.5 ML/M^2
BH CV ECHO MEAS - SI(TEICH): 49.8 ML/M^2
BH CV ECHO MEAS - SV(AO): 310.1 ML
BH CV ECHO MEAS - SV(CUBED): 124.5 ML
BH CV ECHO MEAS - SV(LVOT): 96.7 ML
BH CV ECHO MEAS - SV(MOD-SP2): 81 ML
BH CV ECHO MEAS - SV(MOD-SP4): 86 ML
BH CV ECHO MEAS - SV(TEICH): 90.1 ML
BH CV ECHO MEAS - TAPSE (>1.6): 1.7 CM
BH CV ECHO MEAS - TR MAX VEL: 221 CM/SEC
BH CV ECHO MEASUREMENTS AVERAGE E/E' RATIO: 10.2
BH CV XLRA - RV BASE: 3.4 CM
BH CV XLRA - RV LENGTH: 8 CM
BH CV XLRA - RV MID: 2.3 CM
BH CV XLRA - TDI S': 7.7 CM/SEC
BUN SERPL-MCNC: 22 MG/DL (ref 6–20)
BUN/CREAT SERPL: 30.6 (ref 7–25)
CALCIUM SPEC-SCNC: 8.9 MG/DL (ref 8.6–10.5)
CHLORIDE SERPL-SCNC: 107 MMOL/L (ref 98–107)
CO2 SERPL-SCNC: 23 MMOL/L (ref 22–29)
CREAT SERPL-MCNC: 0.72 MG/DL (ref 0.57–1)
DEPRECATED RDW RBC AUTO: 42.7 FL (ref 37–54)
ERYTHROCYTE [DISTWIDTH] IN BLOOD BY AUTOMATED COUNT: 12.7 % (ref 12.3–15.4)
GFR SERPL CREATININE-BSD FRML MDRD: 83 ML/MIN/1.73
GLUCOSE SERPL-MCNC: 108 MG/DL (ref 65–99)
HCT VFR BLD AUTO: 44.1 % (ref 34–46.6)
HGB BLD-MCNC: 14.5 G/DL (ref 12–15.9)
LEFT ATRIUM VOLUME INDEX: 41.9 ML/M2
LV EF 2D ECHO EST: 65 %
MAXIMAL PREDICTED HEART RATE: 162 BPM
MCH RBC QN AUTO: 30.2 PG (ref 26.6–33)
MCHC RBC AUTO-ENTMCNC: 32.9 G/DL (ref 31.5–35.7)
MCV RBC AUTO: 91.9 FL (ref 79–97)
PLATELET # BLD AUTO: 183 10*3/MM3 (ref 140–450)
PMV BLD AUTO: 10.6 FL (ref 6–12)
POTASSIUM SERPL-SCNC: 4.1 MMOL/L (ref 3.5–5.2)
RBC # BLD AUTO: 4.8 10*6/MM3 (ref 3.77–5.28)
SODIUM SERPL-SCNC: 141 MMOL/L (ref 136–145)
STRESS TARGET HR: 138 BPM
TROPONIN T SERPL-MCNC: <0.01 NG/ML (ref 0–0.03)
WBC NRBC COR # BLD: 7.03 10*3/MM3 (ref 3.4–10.8)

## 2022-02-18 PROCEDURE — 80048 BASIC METABOLIC PNL TOTAL CA: CPT | Performed by: PHYSICIAN ASSISTANT

## 2022-02-18 PROCEDURE — 93005 ELECTROCARDIOGRAM TRACING: CPT | Performed by: PHYSICIAN ASSISTANT

## 2022-02-18 PROCEDURE — G0378 HOSPITAL OBSERVATION PER HR: HCPCS

## 2022-02-18 PROCEDURE — 93005 ELECTROCARDIOGRAM TRACING: CPT | Performed by: INTERNAL MEDICINE

## 2022-02-18 PROCEDURE — 96376 TX/PRO/DX INJ SAME DRUG ADON: CPT

## 2022-02-18 PROCEDURE — 99204 OFFICE O/P NEW MOD 45 MIN: CPT | Performed by: INTERNAL MEDICINE

## 2022-02-18 PROCEDURE — 85027 COMPLETE CBC AUTOMATED: CPT | Performed by: PHYSICIAN ASSISTANT

## 2022-02-18 PROCEDURE — 96361 HYDRATE IV INFUSION ADD-ON: CPT

## 2022-02-18 PROCEDURE — 93306 TTE W/DOPPLER COMPLETE: CPT

## 2022-02-18 PROCEDURE — 93306 TTE W/DOPPLER COMPLETE: CPT | Performed by: INTERNAL MEDICINE

## 2022-02-18 PROCEDURE — 84484 ASSAY OF TROPONIN QUANT: CPT | Performed by: PHYSICIAN ASSISTANT

## 2022-02-18 RX ORDER — LABETALOL HYDROCHLORIDE 5 MG/ML
10 INJECTION, SOLUTION INTRAVENOUS ONCE
Status: COMPLETED | OUTPATIENT
Start: 2022-02-18 | End: 2022-02-18

## 2022-02-18 RX ORDER — VALSARTAN 320 MG/1
320 TABLET ORAL
Status: DISCONTINUED | OUTPATIENT
Start: 2022-02-18 | End: 2022-02-18 | Stop reason: HOSPADM

## 2022-02-18 RX ORDER — SODIUM CHLORIDE, SODIUM LACTATE, POTASSIUM CHLORIDE, CALCIUM CHLORIDE 600; 310; 30; 20 MG/100ML; MG/100ML; MG/100ML; MG/100ML
125 INJECTION, SOLUTION INTRAVENOUS CONTINUOUS
Status: DISCONTINUED | OUTPATIENT
Start: 2022-02-18 | End: 2022-02-18 | Stop reason: HOSPADM

## 2022-02-18 RX ORDER — AMLODIPINE BESYLATE 5 MG/1
5 TABLET ORAL
Status: DISCONTINUED | OUTPATIENT
Start: 2022-02-18 | End: 2022-02-18 | Stop reason: HOSPADM

## 2022-02-18 RX ORDER — AMLODIPINE BESYLATE 5 MG/1
5 TABLET ORAL
Qty: 30 TABLET | Refills: 0 | Status: SHIPPED | OUTPATIENT
Start: 2022-02-19 | End: 2022-04-05 | Stop reason: SDUPTHER

## 2022-02-18 RX ADMIN — SODIUM CHLORIDE, POTASSIUM CHLORIDE, SODIUM LACTATE AND CALCIUM CHLORIDE 125 ML/HR: 600; 310; 30; 20 INJECTION, SOLUTION INTRAVENOUS at 02:31

## 2022-02-18 RX ADMIN — VALSARTAN 320 MG: 320 TABLET, FILM COATED ORAL at 10:11

## 2022-02-18 RX ADMIN — SODIUM CHLORIDE, PRESERVATIVE FREE 10 ML: 5 INJECTION INTRAVENOUS at 09:32

## 2022-02-18 RX ADMIN — LABETALOL HYDROCHLORIDE 10 MG: 5 INJECTION, SOLUTION INTRAVENOUS at 01:43

## 2022-02-18 RX ADMIN — AMLODIPINE BESYLATE 5 MG: 5 TABLET ORAL at 10:10

## 2022-02-18 NOTE — CASE MANAGEMENT/SOCIAL WORK
Discharge Planning Assessment  [facility]     Patient Name: Emy Medina  MRN: [id number]  Today's Date: 2/17/2022    Admit Date: 2/17/2022     Discharge Needs Assessment     Row Name 02/17/22 2056       Living Environment    Lives With spouse    Name(s) of Who Lives With Patient  Candice Baker    Current Living Arrangements home/apartment/condo    Primary Care Provided by self    Provides Primary Care For no one    Family Caregiver if Needed spouse    Family Caregiver Names  - Samuel    Quality of Family Relationships supportive    Able to Return to Prior Arrangements yes       Resource/Environmental Concerns    Transportation Concerns car, none       Transition Planning    Patient/Family Anticipates Transition to home with family    Patient/Family Anticipated Services at Transition none    Transportation Anticipated family or friend will provide       Discharge Needs Assessment    Readmission Within the Last 30 Days no previous admission in last 30 days    Equipment Currently Used at Home none    Concerns to be Addressed no discharge needs identified; denies needs/concerns at this time    Anticipated Changes Related to Illness none    Equipment Needed After Discharge none    Patient's Choice of Community Agency(s) Denies any discharge needs at this time               Discharge Plan     Row Name 02/17/22 2057       Plan    Plan Discharge home with family    Patient/Family in Agreement with Plan yes    Plan Comments I entered the patients room in proper PPE, introduced myself and my role.  The patient currently lives with her , Samuel, who was in the room and permission was given to speak to.  She denies any falls in the last 30 days.  She states that she has 2 stairs to get into her home.  She currently uses [facility] on [address] at [phone].  She denies ever using Home Health or Rehab.  She also denies and DME or needs at discharge.  I did encourage both the patient and her  to  request Case Management should her needs change.  They both verbalized understanding and had no further questions at this time.              Continued Care and Services - Admitted Since 2/17/2022    Coordination has not been started for this encounter.          Demographic Summary    No documentation.                Functional Status    No documentation.                Psychosocial    No documentation.                Abuse/Neglect    No documentation.                Legal    No documentation.                Substance Abuse    No documentation.                Patient Forms    No documentation.                   Cesia Mitchell RN

## 2022-02-18 NOTE — H&P
[facility]   HISTORY AND PHYSICAL    Patient Name: Emy Medina  : 1965  MRN: [id number]  Primary Care Physician:  Clem Bush MD  Date of admission: 2022    Subjective   Subjective     Chief Complaint:   Chief Complaint   Patient presents with   • Chest Pain         HPI:    Emy Medina is a 56 y.o. female with history of osteoporosis, hypertension and remote PE who presented to the ED today complaining of chest pain for one day. 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 She states while working today she developed chest pain around 1:45 or 2 pm. Patient states pain is right in the center of her chest at her sternum. Patient states pain was sharp, shooting, felt like pleurisy she has had in the past.  Patient states that she then reached above her head and felt the pain again.  She was concerned she pulled a muscle or possibly cracked a rib due to her history of osteoporosis.  Patient states pain persisted throughout day, worse with movement and leaning forward, made better by sitting still.  Troponin and EKG negative in ED, however patient has been hypertensive.  D-dimer elevated, CTA negative for PE.  Patient states she has had some associated shortness of breath but denies radiation to neck, jaw, or arm.  Denies nausea and vomiting.  Patient does have murmur on auscultation which she states that she was unaware of.  States she has never been told she had a murmur, aortic stenosis, or aortic regurgitation.  Patient states she had a stress test many years ago.  She has never had a heart cath.  Patient's blood pressure has remained elevated despite receiving her nighttime dose of blood pressure medicine.    Review of Systems   All systems were reviewed and negative except for: what is discussed in the HPI    Personal History     Past Medical History:   Diagnosis Date   • Arthritis    •  Hypertension    • PE (pulmonary embolism)    • Pleurisy        Past Surgical History:   Procedure Laterality Date   • CATARACT EXTRACTION     • HYSTERECTOMY     • LASIK     • SINUS SURGERY         Family History: family history is not on file. Otherwise pertinent FHx was reviewed and not pertinent to current issue.    Social History:  reports that she has never smoked. She does not have any smokeless tobacco history on file. She reports that she does not drink alcohol and does not use drugs.    Home Medications:  Calcium Carbonate-Vit D-Min, Loratadine, Vitamin D3, acetaminophen, fluticasone, meloxicam, and valsartan    Allergies:  Allergies   Allergen Reactions   • Doxycycline    • Sulfa Antibiotics        Objective   Objective     Vitals:   Temp:  [98 °F (36.7 °C)] 98 °F (36.7 °C)  Heart Rate:  [53-68] 59  Resp:  [18] 18  BP: (164-191)/() 191/100  Physical Exam     GENERAL: 56 y.o. YO female a/o x 4, NAD  SKIN: Warm pink and dry   HEENT:  PERRLA, EOM intact, conjunctiva normal, sclera clear  NECK: supple, no JVD  LUNGS: Clear to auscultation bilaterally without wheezes, rales or rhonchi.  No accessory muscle use and no nasal flaring.  CARDIAC:  Regular rate and rhythm, S1-S2.  + murmur heard best at right sternal border, rubs or gallops.  No peripheral edema.  Equal pulses bilaterally.  ABDOMEN: Soft, nontender, nondistended.  No guarding or rebound tenderness.  Normal bowel sounds.  MUSCULOSKELETAL: Moves all extremities well.  No deformity.  NEURO: Cranial nerves II through XII grossly intact.  No gross focal deficits.  Alert.  Normal speech and motor.  PSYCH: Normal mood and affect       Result Review    Result Review:  I have personally reviewed the results from the time of this admission to 2/17/2022 21:08 EST and agree with these findings:  [x]  Laboratory  []  Microbiology  [x]  Radiology  [x]  EKG/Telemetry   []  Cardiology/Vascular   []  Pathology  []  Old records  []  Other:  Most notable findings  include: Troponin negative x2, CTA chest negative for pulmonary embolism, chronic interstitial scarring at the base, no pericardial effusion    EKG         EKG time: 1711  Rhythm/Rate: Sinus rhythm, 63 bpm   AL: Normal P waves,   QRS, axis: 120, normal axis  QTc poor R wave progression  ST and T waves: Nonspecific ST T wave changes, premature ventricular contractions  EKG Tracing Interpreted Contemporaneously by me, independently viewed by me and MD.  Compared to prior ECG in October 2018, PVCs are new        Assessment/Plan   Assessment / Plan     Brief Patient Summary:  Emy Medina is a 56 y.o. female who is being admitted to the observation unit for further evaluation of chest pain    Active Hospital Problems:  Active Hospital Problems    Diagnosis    • Chest pain      Plan:     1. Chest pain  -Troponin negative x2, trend  -ECG nonischemic, repeat in the a.m.  -Consult cardiology  -Nitroglycerin as needed chest pain  -Monitor    2.  Heart murmur  -Echo ordered upon admission  -Cardiology consulted    3.  Hypertension  -We will give one-time dose of IV labetalol and reevaluate  -Resume home medications as prescribed  -Continue to monitor      DVT prophylaxis:  No DVT prophylaxis order currently exists.    CODE STATUS:       Admission Status:  I believe this patient meets observation status.    I wore an N95 mask, face shield, and gloves during this patient encounter. Patient also wearing a surgical mask throughout encounter. Hand hygeine performed before and after seeing the patient.    Electronically signed by ANIRUDH Pozo, 02/17/22, 9:08 PM EST.

## 2022-02-18 NOTE — CONSULTS
Patient Name: Emy Medina  :1965  56 y.o.    Date of Admission: 2022  Date of Consultation:  22  Encounter Provider: Jammie Mix RN  Place of Service: [facility] CARDIOLOGY  Referring Provider: Scottie Velázquez MD  Patient Care Team:  Clem Bush MD as PCP - General  Clem Bush MD as PCP - Family Medicine      Chief complaint: Chest Pain    Reason for Consult:  Chest Pain    History of Present Illness:    This is a 56-year-old lady with hypertension, history of PE.  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  At one point she tried to lift a heavy box and had acute sharp pain across the chest.  The pain continued with certain movements including lifting and bending over.  She came to the emergency room at which time she was found to have severe hypertension up to 200/95.  She says she is never had blood pressures as high.  She does not check her blood pressure at home.  She takes valsartan 320 mg daily and never misses doses.  She has no history of exertional angina or dyspnea.  Her pain continues this morning and is relieved with laying leaning forward.  She has some pain laying on the side.  Her mother had 2 strokes in her 70s.  No history of coronary disease.  EKG shows LVH with strain.  This is consistent across EKGs from 2018.  CT angiogram for PE was reviewed by myself.  I could visualize the proximal LAD and circumflex arteries and there is no major stenosis that I could see.   I reviewed her echocardiogram that shows normal LV function, normal diastolic function no valvular abnormalities.  No pericardial effusion.      Her B/P was elevated at 164/112 up to 212/96. Labs included negative troponin x 2, elevated D-dimer at 1.25. EKG showed NSR. CT of chest showed no pulmonary embolism or dissection. She received ketorolac 15 mg IV,  mg, and labetalol 5 mg IV along with her  home dose of valsartan.     She has a history of PE after her hysterectomy in 2016 but no longer takes anticoagulation.     No Previous Cardiac Testing found          Past Medical History:   Diagnosis Date   • Arthritis    • Hypertension    • PE (pulmonary embolism)    • Pleurisy        Past Surgical History:   Procedure Laterality Date   • CATARACT EXTRACTION     • HYSTERECTOMY     • LASIK     • SINUS SURGERY           Prior to Admission medications    Medication Sig Start Date End Date Taking? Authorizing Provider   Calcium Carbonate-Vit D-Min (CALCIUM 1200 PO) Take  by mouth.   Yes Mike Deal MD   Cholecalciferol (Vitamin D3) 10 MCG (400 UNIT) capsule Daily.   Yes ProviderMike MD   fluticasone (Flonase) 50 MCG/ACT nasal spray Daily. 8/29/21  Yes Mike Deal MD   Loratadine (Claritin) 10 MG capsule Daily. 8/29/21  Yes Mike Deal MD   meloxicam (MOBIC) 15 MG tablet Daily.   Yes Mike Deal MD   valsartan (DIOVAN) 320 MG tablet Daily.   Yes ProviderMike MD   acetaminophen (TYLENOL) 325 MG tablet Take 650 mg by mouth every 6 (six) hours as needed for mild pain (1-3).    Provider, MD Mike       Allergies   Allergen Reactions   • Doxycycline    • Sulfa Antibiotics        Social History     Socioeconomic History   • Marital status:    Tobacco Use   • Smoking status: Never Smoker   Substance and Sexual Activity   • Alcohol use: No   • Drug use: No       History reviewed. No pertinent family history.    REVIEW OF SYSTEMS:   All systems reviewed.  Pertinent positives identified in HPI.  All other systems are negative.      Objective:     Vitals:    02/17/22 2300 02/18/22 0200 02/18/22 0230 02/18/22 0500   BP: (!) 185/99 109/68 128/80 160/86   BP Location: Left arm Right arm Right arm Right arm   Patient Position: Lying Lying Lying Lying   Pulse:    61   Resp: 18   16   Temp: 97.7 °F (36.5 °C)   97.8 °F (36.6 °C)   TempSrc: Oral   Oral   SpO2: 98%    96%   Weight:       Height:         Body mass index is 25.5 kg/m².    GEN: no distress, alert and oriented  Eyes: normal sclerae, normal lids and lashes  HENT: moist mucous membranes,   Lungs: CTAB, no rales or wheezes  Chest: no abnormalities  Neck: no JVD or carotid bruits  CV: RRR, no murmurs, +2 DP and 2+ carotid pulses b/l  Abdomen: soft, nontender, nondistended  Extremities: no edema  Skin: no rash, warm, dry  Heme/Lymph: no bruising  Psych: organized thought, normal behavior and affect              Results from last 7 days   Lab Units 02/18/22  0503 02/17/22  1808 02/17/22  1808   SODIUM mmol/L 141   < > 144   POTASSIUM mmol/L 4.1   < > 3.7   CHLORIDE mmol/L 107   < > 108*   CO2 mmol/L 23.0   < > 22.5   BUN mg/dL 22*   < > 28*   CREATININE mg/dL 0.72   < > 0.92   CALCIUM mg/dL 8.9   < > 9.2   BILIRUBIN mg/dL  --   --  0.4   ALK PHOS U/L  --   --  93   ALT (SGPT) U/L  --   --  41*   AST (SGOT) U/L  --   --  34*   GLUCOSE mg/dL 108*   < > 113*    < > = values in this interval not displayed.     Results from last 7 days   Lab Units 02/18/22  0205 02/17/22  2039 02/17/22  1808   TROPONIN T ng/mL <0.010 <0.010 <0.010     Results from last 7 days   Lab Units 02/18/22  0503   WBC 10*3/mm3 7.03   HEMOGLOBIN g/dL 14.5   HEMATOCRIT % 44.1   PLATELETS 10*3/mm3 183                         CXR  FINDINGS: The lungs are well-expanded and clear and the heart and hilar  structures are normal. There is no acute disease or change from  10/08/2018.    CTA of chest   FINDINGS: The following findings are present:  1. The pulmonary arteries are well-opacified and there is no evidence of  pulmonary embolus. The thoracic aorta shows no evidence of aneurysm or  dissection.  2. The lungs are well-expanded with some chronic interstitial scarring  at the bases similar to the study of 10/08/2018. There is no evidence of  acute pneumonia. There are no pleural effusions.  3. There is no mediastinal or hilar or axillary adenopathy. There is  no  pericardial effusion. The CT images through the upper liver, spleen,  both adrenal glands, and upper poles of both kidneys are unremarkable.         EKG this AM      EKG on admission      EKG baseline      I personally viewed and interpreted the patient's EKG/Telemetry data.        Assessment and Plan:       1.  Noncardiac chest pain: Likely muscular strain.  She takes Mobic at home, she will continue that and add Tylenol.  2.  Severe hypertension: Continue valsartan 320.  Add amlodipine 5.  She will take daily blood pressures and see me in the office in 1 month    OK for d/c from cardiac perspective    Leighann Echols MD  [facility]  02/18/22

## 2022-02-18 NOTE — ED NOTES
"Nursing report ED to floor  Emy Medina  56 y.o.  female    HPI :   Chief Complaint   Patient presents with   • Chest Pain       Admitting doctor:   Scottie Velázquez MD    Admitting diagnosis:   The primary encounter diagnosis was Chest pain, unspecified type. A diagnosis of Primary hypertension was also pertinent to this visit.    Code status:   Current Code Status     Date Active Code Status Order ID Comments User Context       Not on file    Advance Care Planning Activity          Allergies:   Doxycycline and Sulfa antibiotics    Intake and Output  No intake or output data in the 24 hours ending 02/17/22 2054    Weight:       02/17/22 1800   Weight: 71.7 kg (158 lb)       Most recent vitals:   Vitals:    02/17/22 1706 02/17/22 1800 02/17/22 1901 02/17/22 2007   BP:  (!) 164/112 170/96    BP Location:  Right arm     Patient Position:  Lying     Pulse:   55 58   Resp:       Temp:       SpO2: 97%  93% 96%   Weight:  71.7 kg (158 lb)     Height:  167.6 cm (66\")         Active LDAs/IV Access:   Lines, Drains & Airways     Active LDAs     Name Placement date Placement time Site Days    Peripheral IV 02/17/22 1759 Right Antecubital 02/17/22 1759  Antecubital  less than 1                Labs (abnormal labs have a star):   Labs Reviewed   COMPREHENSIVE METABOLIC PANEL - Abnormal; Notable for the following components:       Result Value    Glucose 113 (*)     BUN 28 (*)     Chloride 108 (*)     ALT (SGPT) 41 (*)     AST (SGOT) 34 (*)     BUN/Creatinine Ratio 30.4 (*)     All other components within normal limits    Narrative:     GFR Normal >60  Chronic Kidney Disease <60  Kidney Failure <15     D-DIMER, QUANTITATIVE - Abnormal; Notable for the following components:    D-Dimer, Quantitative 1.25 (*)     All other components within normal limits    Narrative:     The Stago D-Dimer test used in conjunction with a clinical pretest probability (PTP) assessment model, has been approved by the FDA to rule out the " presence of venous thromboembolism (VTE) in outpatients suspected of deep venous thrombosis (DVT) or pulmonary embolism (PE). The cut-off for negative predictive value is <0.50 MCGFEU/mL.   CBC WITH AUTO DIFFERENTIAL - Abnormal; Notable for the following components:    Eosinophil % 8.4 (*)     Immature Grans % 0.6 (*)     Eosinophils, Absolute 0.58 (*)     All other components within normal limits   TROPONIN (IN-HOUSE) - Normal    Narrative:     Troponin T Reference Range:  <= 0.03 ng/mL-   Negative for AMI  >0.03 ng/mL-     Abnormal for myocardial necrosis.  Clinicians would have to utilize clinical acumen, EKG, Troponin and serial changes to determine if it is an Acute Myocardial Infarction or myocardial injury due to an underlying chronic condition.       Results may be falsely decreased if patient taking Biotin.     COVID PRE-OP / PRE-PROCEDURE SCREENING ORDER (NO ISOLATION)    Narrative:     The following orders were created for panel order COVID PRE-OP / PRE-PROCEDURE SCREENING ORDER (NO ISOLATION) - Swab, Nasopharynx.  Procedure                               Abnormality         Status                     ---------                               -----------         ------                     COVID-19,INDERJIT GRIMESU IN-HOUSE...[[id number]]                      In process                   Please view results for these tests on the individual orders.   COVID-19,INDERJIT GRIMESU IN-HOUSE CEPHEID/ALBAN, NP SWAB IN TRANSPORT MEDIA 8-12 HR TAT   RAINBOW DRAW    Narrative:     The following orders were created for panel order [place] Draw.  Procedure                               Abnormality         Status                     ---------                               -----------         ------                     Green Top (Gel)[[id number]]                                  Final result               Lavender Top[[id number]]                                     Final result               Gold Top - SST[[id number]]                                    Final result               Light Blue Top[[id number]]                                   Final result                 Please view results for these tests on the individual orders.   TROPONIN (IN-HOUSE)   GREEN TOP   LAVENDER TOP   GOLD TOP - SST   LIGHT BLUE TOP   CBC AND DIFFERENTIAL    Narrative:     The following orders were created for panel order CBC & Differential.  Procedure                               Abnormality         Status                     ---------                               -----------         ------                     CBC Auto Differential[[id number]]        Abnormal            Final result                 Please view results for these tests on the individual orders.       EKG:   ECG 12 Lead   Final Result   HEART RATE= 63  bpm   RR Interval= 956  ms   MS Interval= 170  ms   P Horizontal Axis= -9  deg   P Front Axis= 8  deg   QRSD Interval= 120  ms   QT Interval= 447  ms   QRS Axis= -14  deg   T Wave Axis= 16  deg   - ABNORMAL ECG -   Sinus rhythm   Ventricular premature complex   NON-SPECIFIC ST-T WAVE CHANGES   No change from prior tracing   Electronically Signed By: Reddy Hoffmann ([facility]) 17-Feb-2022 19:11:32   Date and Time of Study: 2022-02-17 17:11:38          Meds given in ED:   Medications   sodium chloride 0.9 % flush 10 mL (10 mL Intravenous Given 2/17/22 1901)   sodium chloride 0.9 % flush 10 mL (10 mL Intravenous Given 2/17/22 1901)   aspirin chewable tablet 324 mg (has no administration in time range)   ketorolac (TORADOL) injection 15 mg (15 mg Intravenous Given 2/17/22 1900)   iopamidol (ISOVUE-370) 76 % injection 95 mL (95 mL Intravenous Given 2/17/22 1950)   valsartan (DIOVAN) tablet 320 mg (320 mg Oral Given 2/17/22 2053)       Imaging results:  No radiology results for the last day    Ambulatory status:   - as tolerated    Social issues:   Social History     Socioeconomic History   • Marital status:    Tobacco Use   • Smoking status: Never Smoker   Substance and  Sexual Activity   • Alcohol use: No   • Drug use: No       NIH Stroke Scale:        Nursing report ED to floor:       Herminio Baker RN  02/17/22 2053

## 2022-02-18 NOTE — PROGRESS NOTES
MD ATTESTATION NOTE    The ASHLEIGH and I have discussed this patient's history, physical exam, and treatment plan.  I have reviewed the documentation and personally had a face to face interaction with the patient. I affirm the documentation and agree with the treatment and plan.  The attached note describes my personal findings.      I provided a substantive portion of the care of the patient.  I personally performed the physical exam in its entirety, and below are my findings.  For this patient encounter, the patient wore surgical mask, I wore full protective PPE including N95 and eye protection.      Brief HPI: This patient is a 56-year-old female with a history of hypertension as well as previous pulmonary embolism presenting to the emergency room with a 1 day history of chest discomfort.  Currently throughout the evening, she states that her chest pain has significantly improved and she has no acute complaints.  CTA in the ED was negative for pulmonary embolism    PHYSICAL EXAM  ED Triage Vitals   Temp Heart Rate Resp BP SpO2   02/17/22 1705 02/17/22 1705 02/17/22 1705 02/17/22 1800 02/17/22 1705   98 °F (36.7 °C) 68 18 (!) 164/112 97 %      Temp src Heart Rate Source Patient Position BP Location FiO2 (%)   02/17/22 2155 02/17/22 2155 02/17/22 1800 02/17/22 1800 --   Oral Monitor Lying Right arm          GENERAL: Resting comfortably and in no acute distress  HENT: nares patent  EYES: no scleral icterus  CV: regular rhythm, normal rate, no M/R/G  RESPIRATORY: normal effort, lungs clear bilaterally  ABDOMEN: soft, nontender, no palpable masses  MUSCULOSKELETAL: no deformity, no edema bilaterally  NEURO: alert, moves all extremities, follows commands  PSYCH:  calm, cooperative  SKIN: warm, dry    Vital signs and nursing notes reviewed.        Plan: Serial cardiac enzymes in the emergency room are all unremarkable.  Cardiology is to see and assess the patient  in consultation this morning.  Disposition will be to  follow.

## 2022-02-18 NOTE — PROGRESS NOTES
[facility]     Progress Note    Name: Emy Medina ADMIT: 2022   : 1965  PCP: Clem Bush MD    MRN: [id number] LOS: 0 days   AGE/SEX: 56 y.o. female  ROOM: Critical access hospital/     Subjective   Subjective     Subjective   Patient reports chest pain has improved since yesterday. It was discussed with cardiologist to be likely musculoskeletal in nature and states she takes Meloxicam for RA anyway, so she'll continue this. Patient has no shortness of breath or associated complaints. She was cleared for discharge by cardiology and states she feels ready to go home.     Review of Systems   All other systems reviewed and are negative.         Objective   Objective     Objective   Vital Signs  Temp:  [97.7 °F (36.5 °C)-98 °F (36.7 °C)] 97.8 °F (36.6 °C)  Heart Rate:  [53-93] 93  Resp:  [16-18] 16  BP: (109-212)/() 161/89  SpO2:  [93 %-100 %] 100 %  on   ;   Device (Oxygen Therapy): room air  Body mass index is 25.5 kg/m².  Physical Exam  Vitals and nursing note reviewed.   Constitutional:       General: She is not in acute distress.     Appearance: Normal appearance. She is normal weight.   HENT:      Head: Normocephalic and atraumatic.      Nose: Nose normal.      Mouth/Throat:      Mouth: Mucous membranes are moist.   Cardiovascular:      Rate and Rhythm: Normal rate and regular rhythm.      Pulses: Normal pulses.      Heart sounds: Normal heart sounds. No murmur heard.      Pulmonary:      Effort: Pulmonary effort is normal. No respiratory distress.      Breath sounds: Normal breath sounds.   Abdominal:      General: Abdomen is flat. Bowel sounds are normal.      Palpations: Abdomen is soft.   Musculoskeletal:         General: No swelling. Normal range of motion.      Cervical back: Normal range of motion and neck supple. No rigidity.   Skin:     General: Skin is warm and dry.   Neurological:      General: No focal deficit present.      Mental Status: She is alert and oriented to person, place, and  time. Mental status is at baseline.   Psychiatric:         Mood and Affect: Mood normal.         Behavior: Behavior normal.         Thought Content: Thought content normal.         Judgment: Judgment normal.         Results Review     I reviewed the patient's new clinical results.  Results from last 7 days   Lab Units 02/18/22  0503 02/17/22  1808   WBC 10*3/mm3 7.03 6.92   HEMOGLOBIN g/dL 14.5 14.4   PLATELETS 10*3/mm3 183 198     Results from last 7 days   Lab Units 02/18/22  0503 02/17/22  1808   SODIUM mmol/L 141 144   POTASSIUM mmol/L 4.1 3.7   CHLORIDE mmol/L 107 108*   CO2 mmol/L 23.0 22.5   BUN mg/dL 22* 28*   CREATININE mg/dL 0.72 0.92   GLUCOSE mg/dL 108* 113*   Estimated Creatinine Clearance: 86.5 mL/min (by C-G formula based on SCr of 0.72 mg/dL).  Results from last 7 days   Lab Units 02/17/22  1808   ALBUMIN g/dL 4.10   BILIRUBIN mg/dL 0.4   ALK PHOS U/L 93   AST (SGOT) U/L 34*   ALT (SGPT) U/L 41*     Results from last 7 days   Lab Units 02/18/22  0503 02/17/22  1808   CALCIUM mg/dL 8.9 9.2   ALBUMIN g/dL  --  4.10       COVID19   Date Value Ref Range Status   02/17/2022 Not Detected Not Detected - Ref. Range Final     No results found for: HGBA1C, POCGLU    Adult Transthoracic Echo Complete W/ Cont if Necessary Per Protocol  · Estimated right ventricular systolic pressure from tricuspid   regurgitation is normal (<35 mmHg).  · Estimated left ventricular EF = 65% Left ventricular systolic function   is normal.  · Left ventricular diastolic function was normal.  · Left atrial volume is mildly increased.  · Mild mitral valve regurgitation is present.       Scheduled Medications  amLODIPine, 5 mg, Oral, Q24H  sodium chloride, 10 mL, Intravenous, Q12H  valsartan, 320 mg, Oral, Q24H    Infusions  lactated ringers, 125 mL/hr, Last Rate: 125 mL/hr (02/18/22 0612)    Diet  NPO Diet         Assessment/Plan   Assessment / Plan       Active Hospital Problems    Diagnosis  POA   • Chest pain [R07.9]  Yes       Resolved Hospital Problems   No resolved problems to display.       56 y.o. female admitted with chest pain to observation unit for further workup.     1. Chest pain  -Troponin negative x2  -ECG nonischemic  -Cardiology consulted, see note, OK for discharge with follow-up and Amlodipine     2.  Heart murmur  -Echo ordered upon admission  -Cardiology consulted     3.  Hypertension  -We will give one-time dose of IV labetalol and reevaluate  -Resume home medications as prescribed  -Continue to monitor      · SCDs for DVT prophylaxis.  · Full code.  · Discussed with patient.  · Anticipate discharge today.    This progress note will additionally serve as discharge summary.     ELA Waggoner  [facility]  02/18/22  10:27 EST

## 2022-02-18 NOTE — PLAN OF CARE
Goal Outcome Evaluation:   Plan of care reviewed with patient   Outcome summary: Pt presented to ED for chest pain and elevated blood pressure. Tropinins drawn, CT of chest performed and chest x-ray. Pt denies chest pain when sitting or laying still. Plan for cardiology to see this AM.

## 2022-02-19 LAB
QT INTERVAL: 454 MS
QT INTERVAL: 495 MS

## 2022-04-05 ENCOUNTER — OFFICE VISIT (OUTPATIENT)
Dept: CARDIOLOGY | Facility: CLINIC | Age: 57
End: 2022-04-05

## 2022-04-05 VITALS
HEART RATE: 62 BPM | HEIGHT: 66 IN | SYSTOLIC BLOOD PRESSURE: 138 MMHG | BODY MASS INDEX: 25.07 KG/M2 | DIASTOLIC BLOOD PRESSURE: 92 MMHG | WEIGHT: 156 LBS

## 2022-04-05 DIAGNOSIS — I10 PRIMARY HYPERTENSION: Primary | ICD-10-CM

## 2022-04-05 PROCEDURE — 99214 OFFICE O/P EST MOD 30 MIN: CPT | Performed by: INTERNAL MEDICINE

## 2022-04-05 RX ORDER — AZELASTINE HYDROCHLORIDE 0.5 MG/ML
SOLUTION/ DROPS OPHTHALMIC DAILY
Status: ON HOLD | COMMUNITY
End: 2022-08-02

## 2022-04-05 RX ORDER — CETIRIZINE HYDROCHLORIDE 10 MG/1
10 TABLET ORAL DAILY
Status: ON HOLD | COMMUNITY
End: 2022-08-02

## 2022-04-05 RX ORDER — AMLODIPINE BESYLATE 10 MG/1
10 TABLET ORAL
Qty: 90 TABLET | Refills: 3 | Status: SHIPPED | OUTPATIENT
Start: 2022-04-05 | End: 2022-04-20

## 2022-04-05 NOTE — PROGRESS NOTES
Subjective:     Encounter Date:04/05/2022      Patient ID: Emy Medina is a 57 y.o. female.    Chief Complaint: Hypertension  HPI:   This is a 57-year-old woman who presented to [facility] emergency room in February 2022 with chest pain.  She is a history of hypertension and PE.  She had severe hypertension when in the emergency room, blood pressure was 200/95.  She had reproducible chest pain on palpation.  She had a CT angiogram during that hospitalization which showed no evidence of PE.  She ruled out for ACS with 2 sets of troponins.  Her EKG showed normal sinus rhythm with LVH/strain.  We uptitrated her antihypertensive therapy by adding amlodipine 5.  She continued valsartan 320.  She returns today for follow-up.  She is feeling well.  When she is under stress her blood pressure continues to rise with diastolics up to 100.  Without stress her blood pressure is very well controlled in the 120s over 80s range.    She works in the cafeteria of a school.  She is .  She has never smoked.  She does not drink alcohol.  Her history of PE was back in 2016 which occurred after hysterectomy.  She was treated with anticoagulation for 6 months.  The following portions of the patient's history were reviewed and updated as appropriate: allergies, current medications, past family history, past medical history, past social history, past surgical history and problem list.     REVIEW OF SYSTEMS:   All systems reviewed.  Pertinent positives identified in HPI.  All other systems are negative.    Past Medical History:   Diagnosis Date   • Arthritis    • Hypertension    • PE (pulmonary embolism)    • Pleurisy        No family history on file.    Social History     Socioeconomic History   • Marital status:    Tobacco Use   • Smoking status: Never Smoker   Substance and Sexual Activity   • Alcohol use: No   • Drug use: No       Allergies   Allergen Reactions   • Doxycycline    • Sulfa Antibiotics        Past Surgical  History:   Procedure Laterality Date   • CATARACT EXTRACTION     • HYSTERECTOMY     • LASIK     • SINUS SURGERY         Procedures       Objective:         PHYSICAL EXAM:  GEN: VSS, no distress,   Eyes: normal sclera, normal lids and lashes  HENT: moist mucus membranes,   Respiratory: CTAB, no rales or wheezes  CV: RRR, no murmurs, , +2 DP and 2+ carotid pulses b/l  GI: NABS, soft,  Nontender, nondistended  MSK: no edema, no scoliosis or kyphosis  Skin: no rash, warm, dry  Heme/Lymph: no bruising or bleeding  Psych: organized thought, normal behavior and affect  Neuro: Cranial nerves grossly intact, Alert and Oriented x 3.         Assessment:          Diagnosis Plan   1. Primary hypertension            Plan:       1.  Hypertension: Increase amlodipine to 10 mg.  Continue valsartan 320.  She may need an additional agent.  2.  Chest pain: Resolved  3.  History of PE 2016 postoperative hysterectomy    Dr. Bush, thank you very much for referring this kind patient to me. Please call me with any questions or concerns. I will see the patient again in the office in 6 months.          Leighann Echols MD  04/05/22  [facility]    Outpatient Encounter Medications as of 4/5/2022   Medication Sig Dispense Refill   • amLODIPine (NORVASC) 5 MG tablet Take 1 tablet by mouth Daily. 30 tablet 0   • azelastine (OPTIVAR) 0.05 % ophthalmic solution Administer  to both eyes Daily.     • Calcium Carbonate-Vit D-Min (CALCIUM 1200 PO) Take  by mouth.     • cetirizine (zyrTEC) 10 MG tablet Take 10 mg by mouth Daily.     • Cholecalciferol (Vitamin D3) 10 MCG (400 UNIT) capsule Daily.     • fluticasone (FLONASE) 50 MCG/ACT nasal spray Daily.     • meloxicam (MOBIC) 15 MG tablet Daily.     • valsartan (DIOVAN) 320 MG tablet Daily.     • [DISCONTINUED] Loratadine 10 MG capsule Daily.     • acetaminophen (TYLENOL) 325 MG tablet Take 650 mg by mouth every 6 (six) hours as needed for mild pain (1-3).       No  facility-administered encounter medications on file as of 4/5/2022.

## 2022-04-20 RX ORDER — CARVEDILOL 12.5 MG/1
12.5 TABLET ORAL 2 TIMES DAILY
Qty: 60 TABLET | Refills: 3 | Status: SHIPPED | OUTPATIENT
Start: 2022-04-20 | End: 2022-05-02 | Stop reason: SDUPTHER

## 2022-05-02 ENCOUNTER — TELEPHONE (OUTPATIENT)
Dept: CARDIOLOGY | Facility: CLINIC | Age: 57
End: 2022-05-02

## 2022-05-02 RX ORDER — CARVEDILOL 25 MG/1
25 TABLET ORAL 2 TIMES DAILY
Qty: 180 TABLET | Refills: 3 | Status: SHIPPED | OUTPATIENT
Start: 2022-05-02 | End: 2022-08-04 | Stop reason: HOSPADM

## 2022-05-02 NOTE — TELEPHONE ENCOUNTER
Pt advised and verbalized understanding to start the Carvedilol 25mg bid, take BP's, and call office on Friday, May 6, 2022 with BP readings.    Angel   5/2/22

## 2022-05-02 NOTE — TELEPHONE ENCOUNTER
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 She is currently prescribed Carvedilol 12.5mg twice a day and Valsartan 320mg qd.    You had stopped Amlodipine and Diltiazem on 4/19/22 due to edema.    I called the pt back to get more info. She is not having any SOA, not having any dizziness, or edema.  She takes the Carvedilol in mornings on way to work and again at 10:00pm.  She said she feels fine, considering...    Please call patient [phone], or advise and I will return the call.          Thank you,    Alena Stephenson, CMA

## 2022-05-03 NOTE — TELEPHONE ENCOUNTER
Pt called with BP concerns. She wanted to know how high is too high for BP? When should she go to ER? She is asymptomatic.     She took her Carvedilol 25mg at 9:00pm last night, and at 10:00pm her BP was 181/103.  This afternoon, she took her med at 3:30, and her BP was 156/100 at 4:00pm.    Please advise. [phone]  Alena

## 2022-05-03 NOTE — TELEPHONE ENCOUNTER
Pt advised and verbalized understanding. She will take the Carvedilol every 12 hrs, take BP's, and call us on Thursday.  She knows to go to the ER w/ HA/CP/stroke-like symptoms.    Alena   5/3/22  5:08pm

## 2022-05-05 NOTE — TELEPHONE ENCOUNTER
Pt called with recent BP's as instructed. Her Carvedilol was increased a few days ago from 12.5mg bid to 25mg bid.  This morning her BP's were:    4am 167/109  425am 193/112  And another was 173/110.    Please advise, pt can be reached at [phone]    Thank you,    Alena Stephenson, CMA

## 2022-05-06 DIAGNOSIS — I10 HYPERTENSION, UNSPECIFIED TYPE: Primary | ICD-10-CM

## 2022-05-06 RX ORDER — SPIRONOLACTONE 25 MG/1
25 TABLET ORAL DAILY
Qty: 90 TABLET | Refills: 3 | Status: SHIPPED | OUTPATIENT
Start: 2022-05-06 | End: 2022-08-04 | Stop reason: HOSPADM

## 2022-05-09 ENCOUNTER — OFFICE VISIT (OUTPATIENT)
Dept: CARDIOLOGY | Facility: CLINIC | Age: 57
End: 2022-05-09

## 2022-05-09 ENCOUNTER — LAB (OUTPATIENT)
Dept: LAB | Facility: HOSPITAL | Age: 57
End: 2022-05-09

## 2022-05-09 VITALS — DIASTOLIC BLOOD PRESSURE: 87 MMHG | SYSTOLIC BLOOD PRESSURE: 154 MMHG | HEART RATE: 44 BPM

## 2022-05-09 DIAGNOSIS — I10 PRIMARY HYPERTENSION: Primary | ICD-10-CM

## 2022-05-09 DIAGNOSIS — I10 HYPERTENSION, UNSPECIFIED TYPE: ICD-10-CM

## 2022-05-09 LAB
ANION GAP SERPL CALCULATED.3IONS-SCNC: 12 MMOL/L (ref 5–15)
BUN SERPL-MCNC: 16 MG/DL (ref 6–20)
BUN/CREAT SERPL: 19.8 (ref 7–25)
CALCIUM SPEC-SCNC: 9.1 MG/DL (ref 8.6–10.5)
CHLORIDE SERPL-SCNC: 106 MMOL/L (ref 98–107)
CO2 SERPL-SCNC: 23 MMOL/L (ref 22–29)
CREAT SERPL-MCNC: 0.81 MG/DL (ref 0.57–1)
EGFRCR SERPLBLD CKD-EPI 2021: 83.7 ML/MIN/1.73
GLUCOSE SERPL-MCNC: 94 MG/DL (ref 65–99)
POTASSIUM SERPL-SCNC: 4.2 MMOL/L (ref 3.5–5.2)
SODIUM SERPL-SCNC: 141 MMOL/L (ref 136–145)

## 2022-05-09 PROCEDURE — 80048 BASIC METABOLIC PNL TOTAL CA: CPT

## 2022-05-09 PROCEDURE — 36415 COLL VENOUS BLD VENIPUNCTURE: CPT

## 2022-05-09 NOTE — PROGRESS NOTES
Pt came in today for a blood pressure only check    Reviewed medications with pt  Checked her blood pressure and then had her check it with her machine    Reviewed results with Santa MAHMOOD    Per Santa have pt continue to monitor her blood pressure and call back on Friday with some updated readings    Pt informed and states that she will sent  Us more readings on Friday through [employer]

## 2022-05-09 NOTE — PROGRESS NOTES
Someone brought her readings back to me. She was 150/80s the last two days, since starting the spironolactone.

## 2022-05-09 NOTE — TELEPHONE ENCOUNTER
Leighann Echols MD   5/9/2022  2:50 PM EDT Back to Top        Please call patient with their normal test results. Stay on spironolactone       Pt advised and verbalized understanding that labs were normal and she will stay on Spironolactone.  She came in today for a BP check and also brought in her BP machine.    She was given this information on Friday, May 6, 2022 by someone in the office. I didn't see an mention of it. So better to be safe than sorry.    Pt is going to send BP's this Friday via [facility]  5/9/22

## 2022-05-23 RX ORDER — HYDRALAZINE HYDROCHLORIDE 25 MG/1
25 TABLET, FILM COATED ORAL 3 TIMES DAILY
Qty: 90 TABLET | Refills: 3 | Status: SHIPPED | OUTPATIENT
Start: 2022-05-23 | End: 2022-09-12

## 2022-06-17 ENCOUNTER — TELEPHONE (OUTPATIENT)
Dept: CARDIOLOGY | Facility: CLINIC | Age: 57
End: 2022-06-17

## 2022-06-17 NOTE — TELEPHONE ENCOUNTER
Dr. Echols,    Pt took her B/P several times this morning before medications because her B/P was elevated last night and she wanted to monitor it closely. Two hours after morning medications her B/P was 161/111, HR 47. This afternoon her B/P was 144/91, HR 55. Pt is asymptomatic. She does eat some salty foods throughout the week. She also drinks caffeine. Education on sodium/caffeine intake completed.     Pt and I made a plan that she would check her B/P and HR twice a day. First measurement two hours after am meds. Second measurement two hours after afternoon meds. She is going to try this for the next week and update us with her readings. If she becomes symptomatic with an elevated B/P she will go to ER or call us if it is a week day.    Do you have any further recommendations for her? (If needed, see patient mychart messages with photos of B/P readings)    Thank you,    Gisselle Quinn RN  Triage [facility]

## 2022-06-17 NOTE — TELEPHONE ENCOUNTER
Spoke to pt. She is rfrequently checking her BP. It's normal. Now. Will check over the weekend and will contact us on  Monday with her log.

## 2022-08-02 ENCOUNTER — APPOINTMENT (OUTPATIENT)
Dept: CT IMAGING | Facility: HOSPITAL | Age: 57
End: 2022-08-02

## 2022-08-02 ENCOUNTER — APPOINTMENT (OUTPATIENT)
Dept: GENERAL RADIOLOGY | Facility: HOSPITAL | Age: 57
End: 2022-08-02

## 2022-08-02 ENCOUNTER — APPOINTMENT (OUTPATIENT)
Dept: CARDIOLOGY | Facility: HOSPITAL | Age: 57
End: 2022-08-02

## 2022-08-02 ENCOUNTER — HOSPITAL ENCOUNTER (OUTPATIENT)
Facility: HOSPITAL | Age: 57
Setting detail: OBSERVATION
Discharge: HOME | End: 2022-08-04
Attending: EMERGENCY MEDICINE | Admitting: INTERNAL MEDICINE

## 2022-08-02 DIAGNOSIS — I63.9 CEREBROVASCULAR ACCIDENT (CVA), UNSPECIFIED MECHANISM: Primary | ICD-10-CM

## 2022-08-02 LAB
ALBUMIN SERPL-MCNC: 4.8 G/DL (ref 3.5–5.2)
ALBUMIN/GLOB SERPL: 2 G/DL
ALP SERPL-CCNC: 69 U/L (ref 39–117)
ALT SERPL W P-5'-P-CCNC: 30 U/L (ref 1–33)
ANION GAP SERPL CALCULATED.3IONS-SCNC: 11 MMOL/L (ref 5–15)
AST SERPL-CCNC: 27 U/L (ref 1–32)
BASOPHILS # BLD AUTO: 0.1 10*3/MM3 (ref 0–0.2)
BASOPHILS NFR BLD AUTO: 1 % (ref 0–1.5)
BILIRUB SERPL-MCNC: 0.8 MG/DL (ref 0–1.2)
BUN SERPL-MCNC: 29 MG/DL (ref 6–20)
BUN/CREAT SERPL: 24.4 (ref 7–25)
CALCIUM SPEC-SCNC: 10 MG/DL (ref 8.6–10.5)
CHLORIDE SERPL-SCNC: 110 MMOL/L (ref 98–107)
CO2 SERPL-SCNC: 25 MMOL/L (ref 22–29)
CREAT SERPL-MCNC: 1.19 MG/DL (ref 0.57–1)
DEPRECATED RDW RBC AUTO: 41.4 FL (ref 37–54)
EGFRCR SERPLBLD CKD-EPI 2021: 52.8 ML/MIN/1.73
EOSINOPHIL # BLD AUTO: 0.3 10*3/MM3 (ref 0–0.4)
EOSINOPHIL NFR BLD AUTO: 3.1 % (ref 0.3–6.2)
ERYTHROCYTE [DISTWIDTH] IN BLOOD BY AUTOMATED COUNT: 12.5 % (ref 12.3–15.4)
GLOBULIN UR ELPH-MCNC: 2.4 GM/DL
GLUCOSE SERPL-MCNC: 103 MG/DL (ref 65–99)
HCT VFR BLD AUTO: 47.8 % (ref 34–46.6)
HGB BLD-MCNC: 16 G/DL (ref 12–15.9)
IMM GRANULOCYTES # BLD AUTO: 0.05 10*3/MM3 (ref 0–0.05)
IMM GRANULOCYTES NFR BLD AUTO: 0.5 % (ref 0–0.5)
LYMPHOCYTES # BLD AUTO: 1.75 10*3/MM3 (ref 0.7–3.1)
LYMPHOCYTES NFR BLD AUTO: 18 % (ref 19.6–45.3)
MCH RBC QN AUTO: 30.6 PG (ref 26.6–33)
MCHC RBC AUTO-ENTMCNC: 33.5 G/DL (ref 31.5–35.7)
MCV RBC AUTO: 91.4 FL (ref 79–97)
MONOCYTES # BLD AUTO: 0.46 10*3/MM3 (ref 0.1–0.9)
MONOCYTES NFR BLD AUTO: 4.7 % (ref 5–12)
NEUTROPHILS NFR BLD AUTO: 7.04 10*3/MM3 (ref 1.7–7)
NEUTROPHILS NFR BLD AUTO: 72.7 % (ref 42.7–76)
NRBC BLD AUTO-RTO: 0 /100 WBC (ref 0–0.2)
PLATELET # BLD AUTO: 223 10*3/MM3 (ref 140–450)
PMV BLD AUTO: 11.2 FL (ref 6–12)
POTASSIUM SERPL-SCNC: 5.6 MMOL/L (ref 3.5–5.2)
PROT SERPL-MCNC: 7.2 G/DL (ref 6–8.5)
QT INTERVAL: 446 MS
RBC # BLD AUTO: 5.23 10*6/MM3 (ref 3.77–5.28)
SARS-COV-2 ORF1AB RESP QL NAA+PROBE: NOT DETECTED
SODIUM SERPL-SCNC: 146 MMOL/L (ref 136–145)
TROPONIN T SERPL-MCNC: <0.01 NG/ML (ref 0–0.03)
WBC NRBC COR # BLD: 9.7 10*3/MM3 (ref 3.4–10.8)

## 2022-08-02 PROCEDURE — 25010000002 ONDANSETRON PER 1 MG: Performed by: NURSE PRACTITIONER

## 2022-08-02 PROCEDURE — 84484 ASSAY OF TROPONIN QUANT: CPT | Performed by: PHYSICIAN ASSISTANT

## 2022-08-02 PROCEDURE — 93306 TTE W/DOPPLER COMPLETE: CPT | Performed by: INTERNAL MEDICINE

## 2022-08-02 PROCEDURE — 71045 X-RAY EXAM CHEST 1 VIEW: CPT

## 2022-08-02 PROCEDURE — 25010000002 MORPHINE PER 10 MG: Performed by: EMERGENCY MEDICINE

## 2022-08-02 PROCEDURE — G0378 HOSPITAL OBSERVATION PER HR: HCPCS

## 2022-08-02 PROCEDURE — 70450 CT HEAD/BRAIN W/O DYE: CPT

## 2022-08-02 PROCEDURE — 96374 THER/PROPH/DIAG INJ IV PUSH: CPT

## 2022-08-02 PROCEDURE — 93005 ELECTROCARDIOGRAM TRACING: CPT | Performed by: PHYSICIAN ASSISTANT

## 2022-08-02 PROCEDURE — 80053 COMPREHEN METABOLIC PANEL: CPT | Performed by: PHYSICIAN ASSISTANT

## 2022-08-02 PROCEDURE — U0004 COV-19 TEST NON-CDC HGH THRU: HCPCS | Performed by: EMERGENCY MEDICINE

## 2022-08-02 PROCEDURE — 93010 ELECTROCARDIOGRAM REPORT: CPT | Performed by: INTERNAL MEDICINE

## 2022-08-02 PROCEDURE — 99215 OFFICE O/P EST HI 40 MIN: CPT | Performed by: PSYCHIATRY & NEUROLOGY

## 2022-08-02 PROCEDURE — 96375 TX/PRO/DX INJ NEW DRUG ADDON: CPT

## 2022-08-02 PROCEDURE — 85025 COMPLETE CBC W/AUTO DIFF WBC: CPT | Performed by: PHYSICIAN ASSISTANT

## 2022-08-02 PROCEDURE — 99284 EMERGENCY DEPT VISIT MOD MDM: CPT

## 2022-08-02 PROCEDURE — C9803 HOPD COVID-19 SPEC COLLECT: HCPCS

## 2022-08-02 PROCEDURE — 93306 TTE W/DOPPLER COMPLETE: CPT

## 2022-08-02 RX ORDER — SODIUM CHLORIDE 0.9 % (FLUSH) 0.9 %
10 SYRINGE (ML) INJECTION AS NEEDED
Status: DISCONTINUED | OUTPATIENT
Start: 2022-08-02 | End: 2022-08-04 | Stop reason: HOSPADM

## 2022-08-02 RX ORDER — LOPERAMIDE HYDROCHLORIDE 2 MG/1
2 CAPSULE ORAL ONCE
Status: COMPLETED | OUTPATIENT
Start: 2022-08-02 | End: 2022-08-02

## 2022-08-02 RX ORDER — CARVEDILOL 12.5 MG/1
12.5 TABLET ORAL 2 TIMES DAILY
Status: DISCONTINUED | OUTPATIENT
Start: 2022-08-02 | End: 2022-08-04 | Stop reason: HOSPADM

## 2022-08-02 RX ORDER — ASPIRIN 300 MG/1
300 SUPPOSITORY RECTAL DAILY
Status: DISCONTINUED | OUTPATIENT
Start: 2022-08-02 | End: 2022-08-04 | Stop reason: HOSPADM

## 2022-08-02 RX ORDER — OMEGA-3S/DHA/EPA/FISH OIL/D3 300MG-1000
400 CAPSULE ORAL EVERY 24 HOURS
Status: DISCONTINUED | OUTPATIENT
Start: 2022-08-02 | End: 2022-08-04 | Stop reason: HOSPADM

## 2022-08-02 RX ORDER — ATORVASTATIN CALCIUM 80 MG/1
80 TABLET, FILM COATED ORAL NIGHTLY
Status: DISCONTINUED | OUTPATIENT
Start: 2022-08-02 | End: 2022-08-04 | Stop reason: HOSPADM

## 2022-08-02 RX ORDER — ASPIRIN 81 MG/1
81 TABLET, CHEWABLE ORAL DAILY
Status: DISCONTINUED | OUTPATIENT
Start: 2022-08-02 | End: 2022-08-04 | Stop reason: HOSPADM

## 2022-08-02 RX ORDER — HYDRALAZINE HYDROCHLORIDE 25 MG/1
25 TABLET, FILM COATED ORAL 3 TIMES DAILY
Status: DISCONTINUED | OUTPATIENT
Start: 2022-08-02 | End: 2022-08-04 | Stop reason: HOSPADM

## 2022-08-02 RX ORDER — ONDANSETRON 2 MG/ML
4 INJECTION INTRAMUSCULAR; INTRAVENOUS EVERY 6 HOURS PRN
Status: DISCONTINUED | OUTPATIENT
Start: 2022-08-02 | End: 2022-08-04 | Stop reason: HOSPADM

## 2022-08-02 RX ORDER — SODIUM CHLORIDE 0.9 % (FLUSH) 0.9 %
10 SYRINGE (ML) INJECTION EVERY 12 HOURS SCHEDULED
Status: DISCONTINUED | OUTPATIENT
Start: 2022-08-02 | End: 2022-08-04 | Stop reason: HOSPADM

## 2022-08-02 RX ORDER — HYDRALAZINE HYDROCHLORIDE 10 MG/1
10 TABLET, FILM COATED ORAL EVERY 8 HOURS PRN
Status: DISCONTINUED | OUTPATIENT
Start: 2022-08-02 | End: 2022-08-04 | Stop reason: HOSPADM

## 2022-08-02 RX ORDER — VALSARTAN 160 MG/1
160 TABLET ORAL
Status: DISCONTINUED | OUTPATIENT
Start: 2022-08-02 | End: 2022-08-04 | Stop reason: HOSPADM

## 2022-08-02 RX ORDER — SODIUM CHLORIDE 9 MG/ML
125 INJECTION, SOLUTION INTRAVENOUS CONTINUOUS
Status: DISCONTINUED | OUTPATIENT
Start: 2022-08-02 | End: 2022-08-04 | Stop reason: HOSPADM

## 2022-08-02 RX ORDER — MORPHINE SULFATE 2 MG/ML
2 INJECTION, SOLUTION INTRAMUSCULAR; INTRAVENOUS ONCE
Status: COMPLETED | OUTPATIENT
Start: 2022-08-02 | End: 2022-08-02

## 2022-08-02 RX ADMIN — HYDRALAZINE HYDROCHLORIDE 25 MG: 25 TABLET, FILM COATED ORAL at 20:54

## 2022-08-02 RX ADMIN — ATORVASTATIN CALCIUM 80 MG: 80 TABLET, FILM COATED ORAL at 20:54

## 2022-08-02 RX ADMIN — VALSARTAN 160 MG: 160 TABLET, FILM COATED ORAL at 20:54

## 2022-08-02 RX ADMIN — Medication 10 ML: at 20:55

## 2022-08-02 RX ADMIN — ONDANSETRON 4 MG: 2 INJECTION INTRAMUSCULAR; INTRAVENOUS at 21:02

## 2022-08-02 RX ADMIN — SODIUM CHLORIDE 125 ML/HR: 9 INJECTION, SOLUTION INTRAVENOUS at 18:07

## 2022-08-02 RX ADMIN — ASPIRIN 81 MG: 81 TABLET, CHEWABLE ORAL at 18:06

## 2022-08-02 RX ADMIN — CARVEDILOL 12.5 MG: 12.5 TABLET, FILM COATED ORAL at 20:54

## 2022-08-02 RX ADMIN — MORPHINE SULFATE 2 MG: 2 INJECTION, SOLUTION INTRAMUSCULAR; INTRAVENOUS at 13:24

## 2022-08-02 RX ADMIN — LOPERAMIDE HYDROCHLORIDE 2 MG: 2 CAPSULE ORAL at 21:02

## 2022-08-02 RX ADMIN — Medication 10 ML: at 18:07

## 2022-08-02 RX ADMIN — Medication 10 ML: at 20:56

## 2022-08-02 RX ADMIN — CHOLECALCIFEROL TAB 10 MCG (400 UNIT) 400 UNITS: 10 TAB at 20:54

## 2022-08-02 NOTE — ED PROVIDER NOTES
EMERGENCY DEPARTMENT ENCOUNTER    Room Number:  23/23  Date of encounter:  8/2/2022  PCP: Clem Bush MD  Patient Care Team:  Clem Bush MD as PCP - General  Clem Bush MD as PCP - Family Medicine   Historian: Patient, family    HPI:  Chief Complaint: Right arm and right leg weakness  A complete HPI/ROS/PMH/PSH/SH/FH are unobtainable due to: Nothing    Context: Emy Medina is a 57 y.o. female who presents to the ED c/o right arm and right leg weakness.  She reports that on Saturday she started feeling generally weak.  She reports on Sunday she developed right arm and right leg weakness.  She reports both of them feel heavy.  She reports she feels like she has to push her right leg along.  She denies any sensation change.  She reports her symptoms been constant since then.  She states that she has had similar episodes over the last weeks which have resolved spontaneously and she was waiting for this episode to resolve spontaneously.  She reports that since it did not she talk to her cardiologist today who directed her to the emergency room.  She reports she has been having a headache.  She denies any changes of her speech.  She states over the last few weeks with these episodes she has not been evaluated by any medical professionals.  She reports she has not taken her blood pressure medicines this morning.    Prior record review: Notes between herself and the cardiologist this morning advising her to come to the emergency room    PAST MEDICAL HISTORY  Active Ambulatory Problems     Diagnosis Date Noted   • Chest pain 02/17/2022     Resolved Ambulatory Problems     Diagnosis Date Noted   • No Resolved Ambulatory Problems     Past Medical History:   Diagnosis Date   • Arthritis    • Hypertension    • PE (pulmonary embolism)    • Pleurisy        The patient has started, but not completed, their COVID-19 vaccination series.    PAST SURGICAL HISTORY  Past Surgical History:   Procedure  Laterality Date   • CATARACT EXTRACTION     • HYSTERECTOMY     • LASIK     • SINUS SURGERY           FAMILY HISTORY  History reviewed. No pertinent family history.      SOCIAL HISTORY  Social History     Socioeconomic History   • Marital status:    Tobacco Use   • Smoking status: Never Smoker   Substance and Sexual Activity   • Alcohol use: No   • Drug use: No         ALLERGIES  Doxycycline and Sulfa antibiotics        REVIEW OF SYSTEMS  Review of Systems   No chest pain, no shortness of breath, no nausea, no vomiting, no fever, no chills, positive focal weakness, no focal numbness  All systems reviewed and negative except for those discussed in HPI.       PHYSICAL EXAM    I have reviewed the triage vital signs and nursing notes.    ED Triage Vitals   Temp Heart Rate Resp BP SpO2   08/02/22 0940 08/02/22 0940 08/02/22 0940 08/02/22 1011 08/02/22 0940   97.6 °F (36.4 °C) 54 16 (!) 186/97 97 %      Temp src Heart Rate Source Patient Position BP Location FiO2 (%)   08/02/22 0940 08/02/22 0940 08/02/22 1013 08/02/22 1013 --   Tympanic Monitor Sitting Right arm        Physical Exam  GENERAL: Awake, tearful, alert, no acute distress  SKIN: Warm, dry  HENT: Normocephalic, atraumatic  EYES: no scleral icterus  CV: regular rhythm, regular rate  RESPIRATORY: normal effort, lungs clear  ABDOMEN: soft, nontender, nondistended  MUSCULOSKELETAL: no deformity  NEURO: alert, moves all extremities, follows commands.  No facial asymmetry.  Cranial nerves II through XII intact.  Mild weakness of the right hand and right foot.  Mild weakness proximally in the right leg and right arm.  Normal sensation bilaterally.       1a. Level of Consciousness: 0-->Alert, keenly responsive  1b. LOC Questions: 0-->Answers both questions correctly  1c. LOC Commands: 0-->Performs both tasks correctly  2. Best Gaze: 0-->Normal  3. Visual: 0-->No visual loss  4. Facial Palsy: 0-->Normal symmetrical movements  5a. Motor Arm, Left: 0-->No drift,  limb holds 90 (or 45) degrees for full 10 secs  5b. Motor Arm, Right: 0-->No drift, limb holds 90 (or 45) degrees for full 10 secs  6a. Motor Leg, Left: 0-->No drift, leg holds 30 degree position for full 5 secs  6b. Motor Leg, Right: 0-->No drift, leg holds 30 degree position for full 5 secs  7. Limb Ataxia: 0-->Absent  8. Sensory: 0-->Normal, no sensory loss  9. Best Language: 0-->No aphasia, normal  10. Dysarthria: 0-->Normal  11. Extinction and Inattention (formerly Neglect): 0-->No abnormality    Total (NIH Stroke Scale): 0          LAB RESULTS  Recent Results (from the past 24 hour(s))   Comprehensive Metabolic Panel    Collection Time: 08/02/22 11:06 AM    Specimen: Blood   Result Value Ref Range    Glucose 103 (H) 65 - 99 mg/dL    BUN 29 (H) 6 - 20 mg/dL    Creatinine 1.19 (H) 0.57 - 1.00 mg/dL    Sodium 146 (H) 136 - 145 mmol/L    Potassium 5.6 (H) 3.5 - 5.2 mmol/L    Chloride 110 (H) 98 - 107 mmol/L    CO2 25.0 22.0 - 29.0 mmol/L    Calcium 10.0 8.6 - 10.5 mg/dL    Total Protein 7.2 6.0 - 8.5 g/dL    Albumin 4.80 3.50 - 5.20 g/dL    ALT (SGPT) 30 1 - 33 U/L    AST (SGOT) 27 1 - 32 U/L    Alkaline Phosphatase 69 39 - 117 U/L    Total Bilirubin 0.8 0.0 - 1.2 mg/dL    Globulin 2.4 gm/dL    A/G Ratio 2.0 g/dL    BUN/Creatinine Ratio 24.4 7.0 - 25.0    Anion Gap 11.0 5.0 - 15.0 mmol/L    eGFR 52.8 (L) >60.0 mL/min/1.73   Troponin    Collection Time: 08/02/22 11:06 AM    Specimen: Blood   Result Value Ref Range    Troponin T <0.010 0.000 - 0.030 ng/mL   CBC Auto Differential    Collection Time: 08/02/22 11:06 AM    Specimen: Blood   Result Value Ref Range    WBC 9.70 3.40 - 10.80 10*3/mm3    RBC 5.23 3.77 - 5.28 10*6/mm3    Hemoglobin 16.0 (H) 12.0 - 15.9 g/dL    Hematocrit 47.8 (H) 34.0 - 46.6 %    MCV 91.4 79.0 - 97.0 fL    MCH 30.6 26.6 - 33.0 pg    MCHC 33.5 31.5 - 35.7 g/dL    RDW 12.5 12.3 - 15.4 %    RDW-SD 41.4 37.0 - 54.0 fl    MPV 11.2 6.0 - 12.0 fL    Platelets 223 140 - 450 10*3/mm3    Neutrophil %  72.7 42.7 - 76.0 %    Lymphocyte % 18.0 (L) 19.6 - 45.3 %    Monocyte % 4.7 (L) 5.0 - 12.0 %    Eosinophil % 3.1 0.3 - 6.2 %    Basophil % 1.0 0.0 - 1.5 %    Immature Grans % 0.5 0.0 - 0.5 %    Neutrophils, Absolute 7.04 (H) 1.70 - 7.00 10*3/mm3    Lymphocytes, Absolute 1.75 0.70 - 3.10 10*3/mm3    Monocytes, Absolute 0.46 0.10 - 0.90 10*3/mm3    Eosinophils, Absolute 0.30 0.00 - 0.40 10*3/mm3    Basophils, Absolute 0.10 0.00 - 0.20 10*3/mm3    Immature Grans, Absolute 0.05 0.00 - 0.05 10*3/mm3    nRBC 0.0 0.0 - 0.2 /100 WBC   ECG 12 Lead    Collection Time: 08/02/22 11:47 AM   Result Value Ref Range    QT Interval 446 ms       Ordered the above labs and independently reviewed the results.        RADIOLOGY  CT Head Without Contrast    Result Date: 8/2/2022  CT HEAD WITHOUT CONTRAST  HISTORY: Right arm and leg weakness.  COMPARISON: None.  FINDINGS: The brain and ventricles are symmetrical. There is no evidence of hemorrhage, hydrocephalus or of abnormal extra-axial fluid. No focal area of decreased attenuation to suggest acute infarction is identified. A small lacunar infarct is appreciated involving the external capsule on the right posteriorly and superiorly. No focal area of decreased attenuation to suggest acute infarction is identified. Further evaluation could be performed with an MRI examination of the brain as indicated. A septum cavum pellucidum is incidentally noted.      No evidence of acute infarction or of intracranial hemorrhage. The etiology for the patient's right arm and leg weakness is not established. Further evaluation could be performed with an MRI examination of the brain.    Radiation dose reduction techniques were utilized, including automated exposure control and exposure modulation based on body size.       XR Chest 1 View    Result Date: 8/2/2022  XR CHEST 1 VW-  08/02/2022  HISTORY: Possible stroke.  Heart size is within normal limits. Lungs appear free of acute infiltrates. Bones and  soft tissues are unremarkable.      1. No acute process.  This report was finalized on 8/2/2022 11:32 AM by Dr. Barrera Reddy M.D.        I ordered the above noted radiological studies. Reviewed by me and discussed with radiologist.  See dictation for official radiology interpretation.      PROCEDURES    Procedures      MEDICATIONS GIVEN IN ER    Medications   sodium chloride 0.9 % flush 10 mL (has no administration in time range)         PROGRESS, DATA ANALYSIS, CONSULTS, AND MEDICAL DECISION MAKING    All labs have been independently reviewed by me.  All radiology studies have been reviewed by me and discussed with radiologist dictating the report.   EKG's independently viewed and interpreted by me.  Discussion below represents my analysis of pertinent findings related to patient's condition, differential diagnosis, treatment plan and final disposition.    Differential diagnosis includes but is not limited to stroke, TIA, intracranial hemorrhage, intracranial mass, anxiety, electrolyte disturbance, hypertensive urgency, hypertensive emergency.    ED Course as of 08/02/22 1320   Tue Aug 02, 2022   1119 The patient is not a tPA or intervention candidate due to the time of onset being outside of the windows. [TR]   1229 Speaking with Dr. Neff with stroke neurology.  He requests review be admitted to the hospital and not the observation unit given the severity of her symptoms. [TR]   1239 I reviewed work-up and findings with the patient and family at the bedside.  Answered all questions.  Plan admission for stroke work-up and care.  They are agreeable. [TR]   1243 EKG          EKG time: 1147  Rhythm/Rate: Sinus bradycardia, rate 49  P waves and NV: Normal P, normal NV  QRS, axis: Narrow QRS, normal axis  ST and T waves: Normal ST and T waves    Interpreted Contemporaneously by me, independently viewed  Not significantly changed compared to prior 2/18/2022   [TR]   1310 Speaking with Dr. Adhikari with [facility].  Will  admit.  He request Johanne Dyer if possible. [TR]      ED Course User Index  [TR] Indra Lima MD           PPE: The patient wore a mask and I wore an N95 mask throughout the entire patient encounter.       AS OF 13:20 EDT VITALS:    BP - (!) 181/96  HR - 52  TEMP - 97.6 °F (36.4 °C) (Tympanic)  O2 SATS - 98%        DIAGNOSIS  Final diagnoses:   Cerebrovascular accident (CVA), unspecified mechanism (HCC)         DISPOSITION  ED Disposition     ED Disposition   Decision to Admit    Condition   --    Comment   Level of Care: Telemetry [5]   Diagnosis: Cerebrovascular accident (CVA), unspecified mechanism (HCC) [[id number]]   Admitting Physician: RAHEL EDMONDSON [[id number]]   Attending Physician: RAHEL EDMONDSON [[id number]]   Bed Request Comments: Indra Hercules MD  08/02/22 4524

## 2022-08-02 NOTE — ED NOTES
PPE per protocol utilized.  Nursing report ED to floor  Emy Medina  57 y.o.  female    HPI :   Chief Complaint   Patient presents with   • Arm Problem   • Weakness - Generalized       Admitting doctor:   Reji Adhikari MD    Admitting diagnosis:   The encounter diagnosis was Cerebrovascular accident (CVA), unspecified mechanism (HCC).    Code status:   Current Code Status     Date Active Code Status Order ID Comments User Context       Prior    Advance Care Planning Activity          Allergies:   Doxycycline and Sulfa antibiotics    Intake and Output  No intake or output data in the 24 hours ending 08/02/22 1408    Weight:       08/02/22  1013   Weight: 72.6 kg (160 lb)       Most recent vitals:   Vitals:    08/02/22 1327 08/02/22 1331 08/02/22 1401 08/02/22 1406   BP: 177/90 158/90 157/86    BP Location:       Patient Position:       Pulse: 54 54 (!) 48 52   Resp:       Temp:       TempSrc:       SpO2: 98% 96% 94% 95%   Weight:       Height:           Active LDAs/IV Access:   Lines, Drains & Airways     Active LDAs     Name Placement date Placement time Site Days    Peripheral IV 08/02/22 1014 Left Antecubital 08/02/22  1014  Antecubital  less than 1                Labs (abnormal labs have a star):   Labs Reviewed   COMPREHENSIVE METABOLIC PANEL - Abnormal; Notable for the following components:       Result Value    Glucose 103 (*)     BUN 29 (*)     Creatinine 1.19 (*)     Sodium 146 (*)     Potassium 5.6 (*)     Chloride 110 (*)     eGFR 52.8 (*)     All other components within normal limits    Narrative:     GFR Normal >60  Chronic Kidney Disease <60  Kidney Failure <15     CBC WITH AUTO DIFFERENTIAL - Abnormal; Notable for the following components:    Hemoglobin 16.0 (*)     Hematocrit 47.8 (*)     Lymphocyte % 18.0 (*)     Monocyte % 4.7 (*)     Neutrophils, Absolute 7.04 (*)     All other components within normal limits   TROPONIN (IN-HOUSE) - Normal    Narrative:     Troponin T Reference Range:  <=  0.03 ng/mL-   Negative for AMI  >0.03 ng/mL-     Abnormal for myocardial necrosis.  Clinicians would have to utilize clinical acumen, EKG, Troponin and serial changes to determine if it is an Acute Myocardial Infarction or myocardial injury due to an underlying chronic condition.       Results may be falsely decreased if patient taking Biotin.     COVID PRE-OP / PRE-PROCEDURE SCREENING ORDER (NO ISOLATION)    Narrative:     The following orders were created for panel order COVID PRE-OP / PRE-PROCEDURE SCREENING ORDER (NO ISOLATION) - Swab, Nasopharynx.  Procedure                               Abnormality         Status                     ---------                               -----------         ------                     COVID-19,APTIMA PANTHER(...[[id number]]                      In process                   Please view results for these tests on the individual orders.   COVID-19,APTIMA PANTHER (SA[facility]/ [facility]LY, NP/OP SWAB IN UTM/VTM/SALINE TRANSPORT MEDIA,24 HR TAT   CBC AND DIFFERENTIAL    Narrative:     The following orders were created for panel order CBC & Differential.  Procedure                               Abnormality         Status                     ---------                               -----------         ------                     CBC Auto Differential[[id number]]        Abnormal            Final result                 Please view results for these tests on the individual orders.       EKG:   ECG 12 Lead   Preliminary Result   HEART RATE= 49  bpm   RR Interval= 1212  ms   UT Interval= 187  ms   P Horizontal Axis= 5  deg   P Front Axis= 39  deg   QRSD Interval= 107  ms   QT Interval= 446  ms   QRS Axis= 7  deg   T Wave Axis= -10  deg   - BORDERLINE ECG -   Sinus bradycardia   Borderline repol abnrm, inferolateral leads   Electronically Signed By:    Date and Time of Study: 2022-08-02 11:47:04          Meds given in ED:   Medications   sodium chloride 0.9 % flush 10 mL (has no administration in  time range)   morphine injection 2 mg (2 mg Intravenous Given 8/2/22 1324)       Imaging results:  CT Head Without Contrast    Result Date: 8/2/2022  No evidence of acute infarction or of intracranial hemorrhage. The etiology for the patient's right arm and leg weakness is not established. Further evaluation could be performed with an MRI examination of the brain.    Radiation dose reduction techniques were utilized, including automated exposure control and exposure modulation based on body size.       XR Chest 1 View    Result Date: 8/2/2022  1. No acute process.  This report was finalized on 8/2/2022 11:32 AM by Dr. Barrera Reddy M.D.        Ambulatory status:   - up with assist    Social issues:   Social History     Socioeconomic History   • Marital status:    Tobacco Use   • Smoking status: Never Smoker   Substance and Sexual Activity   • Alcohol use: No   • Drug use: No       NIH Stroke Scale:        Nursing report ED to floor:

## 2022-08-02 NOTE — H&P
HISTORY AND PHYSICAL   [facility]        Patient Identification:  Name: Emy Medina  Age: 57 y.o.  Sex: female  :  1965  MRN: [id number]                     Primary Care Physician: Clem Bush MD    Chief Complaint: Right-sided weakness.    History of Present Illness:   Pleasant 57-year-old female presenting with complaints of right arm and right leg weakness which started on .  She describes this as a week sometimes heavy feeling.  No paresthesias or dysesthesias noted.  On the day before, Saturday, she was mowing the yard and was feeling lightheaded.  She stopped and went inside and took her blood pressure which was 98/62.  She rested for a while and repeat blood pressure is 121/82.  On questioning she states that this point she was not experiencing the right-sided weakness.  She has been followed for the last 6 or 8 months for intermittent right upper extremity symptoms which she does not describe as a weakness or heaviness.  There is concern of this being cardiac in origin and she appropriately had a cardiac work-up.  She has struggled with hypertension and is presently on 4 medications for this.  She had an echocardiogram in February of this year which was unremarkable.  There is also history of pulmonary embolus.  Presently she believes the right-sided weakness has improved a little since .  No chest pain, no shortness of air, no lightheadedness, no palpitations.  No other neurologic complaints at this point.      Past Medical History:  Past Medical History:   Diagnosis Date   • Arthritis    • Hypertension    • PE (pulmonary embolism)    • Pleurisy      Past Surgical History:  Past Surgical History:   Procedure Laterality Date   • CATARACT EXTRACTION     • HYSTERECTOMY     • LASIK     • SINUS SURGERY        Home Meds:  (Not in a hospital admission)      Allergies:  Allergies   Allergen Reactions   • Doxycycline    • Sulfa Antibiotics      Immunizations:    There  is no immunization history on file for this patient.  Social History:   Social History     Social History Narrative   • Not on file     Social History     Tobacco Use   • Smoking status: Never Smoker   • Smokeless tobacco: Not on file   Substance Use Topics   • Alcohol use: No     Family History:  History reviewed. No pertinent family history.     Review of Systems  Review of Systems   Constitutional: Negative.    HENT: Negative.    Eyes: Negative.    Respiratory: Negative.    Cardiovascular:        No acute complaints.  Please see history of present illness.   Gastrointestinal: Negative.    Endocrine: Negative.    Genitourinary: Negative.    Musculoskeletal: Negative.    Skin: Negative.    Allergic/Immunologic: Negative.    Neurological:        As per history of present illness.   Hematological: Negative.    Psychiatric/Behavioral: Negative.        Objective:  T Max 24 hrs: Temp (24hrs), Av.6 °F (36.4 °C), Min:97.6 °F (36.4 °C), Max:97.6 °F (36.4 °C)    Vitals Ranges:   Temp:  [97.6 °F (36.4 °C)] 97.6 °F (36.4 °C)  Heart Rate:  [48-54] 52  Resp:  [14-16] 14  BP: (157-186)/(86-99) 157/86      Exam:  Physical Exam  Constitutional:       Appearance: Normal appearance. She is normal weight.   HENT:      Head: Normocephalic and atraumatic.      Right Ear: External ear normal.      Left Ear: External ear normal.      Nose: Nose normal.      Mouth/Throat:      Mouth: Mucous membranes are moist.   Eyes:      General: No scleral icterus.        Right eye: No discharge.         Left eye: No discharge.      Extraocular Movements: Extraocular movements intact.      Conjunctiva/sclera: Conjunctivae normal.   Cardiovascular:      Rate and Rhythm: Regular rhythm. Bradycardia present.      Pulses: Normal pulses.      Heart sounds: Normal heart sounds.   Pulmonary:      Effort: Pulmonary effort is normal.      Breath sounds: Normal breath sounds.   Abdominal:      General: Abdomen is flat. Bowel sounds are normal. There is no  distension.      Palpations: Abdomen is soft. There is no mass.      Tenderness: There is no abdominal tenderness. There is no guarding or rebound.   Musculoskeletal:      Cervical back: Neck supple.      Right lower leg: No edema.      Left lower leg: Edema present.   Skin:     General: Skin is warm and dry.   Neurological:      Mental Status: She is alert and oriented to person, place, and time.      Comments: She is fully alert and oriented.  No facial asymmetry.  Speech is articulate.  Upper extremity strength is essentially 5 out of 5 bilaterally at this point.  Same is true for the lower extremity strength.  Straight leg raising may be slightly weaker on the right than it is on the left.  Patellar DTRs are hyperreflexic bilaterally.  Nearly 4+ but symmetric.  Plantar reflex on the left is slightly upgoing.  Nearly mute on the right however.  2 beat clonus on the left and 4 beat clonus on the right.  Sensory to light touch is normal both upper and lower extremities.  Distal DTRs in upper extremities are normal and symmetric.  She has an IV site in the left antecubital.         Data Review:  All labs and radiology reviewed.    Assessment:  CVA: By history.  CT scan negative for acute changes on day 3 since the onset of symptoms.  Right upper and lower weakness by history.  Neurologic changes at this point are fairly subtle.  Neurology input appreciated.  Continue with CVA protocol.  Hypertension: Blood pressure little on the high side at this point point.  She is a bit past the point of permissive hypertension.  Resume home meds with some adjustments as noted below.  As needed hydralazine.  Hyperkalemia: Mild.  Hold Aldactone.  Hydration with normal saline.  Okay to continue ARB as at this point but would hold if it fails to improve with IV fluids.  Prerenal azotemia: BUN and creatinine above her baseline.  Associated with recent history of significant outdoor work.  IV fluids.  We will run a bit higher rate  than usual for CVA protocol.  Monitor.  Sinus bradycardia: Mild.  Presently asymptomatic.  Decrease beta-blocker dose.  Recheck TSH.  Elevated hemoglobin-hemoconcentration: IV hydration.  Monitor.  History of PE: Continue usual DVT prophylaxis and encourage activity.      Plan:  Please see above.    Reji Adhikari MD  8/2/2022  14:55 EDT    EMR Dragon/Transcription disclaimer:   Much of this encounter note is an electronic transcription/translation of spoken language to printed text. The electronic translation of spoken language may permit erroneous, or at times, nonsensical words or phrases to be inadvertently transcribed; Although I have reviewed the note for such errors, some may still exist.

## 2022-08-02 NOTE — ED TRIAGE NOTES
Right arm is heavy - started Sunday    /Patient was placed in face mask during first look triage.  Patient was wearing a face mask throughout encounter.  I wore personal protective equipment throughout the encounter.  Hand hygiene was performed before and after patient encounter.

## 2022-08-02 NOTE — CONSULTS
NEUROLOGY CONSULT - STROKE TEAM    DOS: 2022  NAME: Emy Medina   : 1965  PCP: Clem Bush MD  CC: Stroke  Referring MD: Reji Adhikari MD  Patient being seen at request of Dr. Adhikari for advice and opinion on stroke.    Neurological Problem and Interval History:  57 y.o. female presents with chief complaint of: right sided weakness. Patient was mowing the lown and felt lightheaded, sat down, and then had to go inside. After she felt better, she went out and continued mowing the lawn. Patient's blood pressure was low initially and then improved. The next morning she felt her right arm felt heavy and that her right leg wasn't working well. This did not resolve and patient ultimately presented to the emergency room.       Past Medical/Surgical Hx:  Past Medical History:   Diagnosis Date   • Arthritis    • Hypertension    • PE (pulmonary embolism)    • Pleurisy      Past Surgical History:   Procedure Laterality Date   • CATARACT EXTRACTION     • HYSTERECTOMY     • LASIK     • SINUS SURGERY         Review of Systems:        No fever, sweats or chills,  No neck pain, back pain or joint pain  No loss of hearing or tinnitus  No blurry or double vision  No rashes or edema  No chest pain or palpitations  No shortness of breath or wheezing  No diarrhea or constipation  No urinary incontinence or dysuria  No seizures or syncope  No depression or anxiety    Medications On Admission  (Not in a hospital admission)      Allergies:  Allergies   Allergen Reactions   • Doxycycline    • Sulfa Antibiotics        Social Hx:  Social History     Socioeconomic History   • Marital status:    Tobacco Use   • Smoking status: Never Smoker   Substance and Sexual Activity   • Alcohol use: No   • Drug use: No       Family Hx:  History reviewed. No pertinent family history.    Review of Imaging (Interpretation of images not reports):      Laboratory Results:   Lab Results   Component Value Date    GLUCOSE  "103 (H) 08/02/2022    CALCIUM 10.0 08/02/2022     (H) 08/02/2022    K 5.6 (H) 08/02/2022    CO2 25.0 08/02/2022     (H) 08/02/2022    BUN 29 (H) 08/02/2022    CREATININE 1.19 (H) 08/02/2022    EGFRIFNONA 83 02/18/2022    BCR 24.4 08/02/2022    ANIONGAP 11.0 08/02/2022     Lab Results   Component Value Date    WBC 9.70 08/02/2022    HGB 16.0 (H) 08/02/2022    HCT 47.8 (H) 08/02/2022    MCV 91.4 08/02/2022     08/02/2022     No results found for: CHOL  Lab Results   Component Value Date    HDL 33 (L) 06/04/2020    HDL 36 (L) 04/01/2019    HDL 34 (L) 06/06/2018     Lab Results   Component Value Date     06/04/2020    LDL 87 04/01/2019     (H) 06/06/2018     Lab Results   Component Value Date    TRIG 138 06/04/2020    TRIG 137 04/01/2019    TRIG 173 (H) 06/06/2018     No results found for: HGBA1C  Lab Results   Component Value Date    INR 1.26 (H) 03/20/2016    INR 1.1 03/01/2016    INR 1.6 (H) 02/02/2016    PROTIME 15.6 (H) 03/20/2016    PROTIME 14.5 (H) 03/01/2016    PROTIME 19.2 (H) 02/02/2016         Physical Examination:   /90   Pulse 54   Temp 97.6 °F (36.4 °C) (Tympanic)   Resp 16   Ht 167.6 cm (66\")   Wt 72.6 kg (160 lb)   SpO2 96%   BMI 25.82 kg/m²   General Appearance:   Well developed, well nourished, well groomed, alert, and cooperative.  HEENT: Normocephalic. Atraumatic. No JVD, no tracheal deviation.  Neck and Spine: Normal range of motion.  Normal alignment. No mass or tenderness. No bruits.  Cardiac: Regular rate and rhythm. No murmurs.  Pulmonary: No shortness of breath, clear anteriorly to auscultation  Abdomen:  Soft, non-tender, non-distended   Peripheral Vasculature: Radial pulses are equal and symmetric. No signs of distal embolization.  Extremities:    No edema or deformities.   Skin:    No rashes or discoloration    Neurological examination:  Higher Integrative  Function: Oriented to time, place and person. Normal registration, recall, attention span " and concentration. Normal language including comprehension, spontaneous speech, repetition, naming and vocabulary. No neglect. Normal fund of knowledge and higher integrative function. Able to perform two step math and simple right-left.   CN II: Pupils are equal, round, and reactive to light. Blinks to visual threat and counts fingers in all fields  CN III IV VI: Extraocular movements are full without nystagmus.   CN V: Normal facial sensation   CN VII: Right nasolabial fold flattening  CN VIII:   Auditory acuity is normal.  CN IX & X:   No palatal or pharnygeal dysarthria.  CN XI: Turns head without abnormality.   CN XII:   The tongue is midline.  No lingual dysarthria.   Motor: No pronator drift but subtle diminished finger tap hand wave and hand tap on left compared to right. 5/5 dorsi/plantar flexion but can over come right hip flexor  Reflexes: 3+ at bilateral knees 2+ left ankle, 1+ right ankle 2+ left brachialis, 1+ right.  Sensation: Normal to light touch, temperature, and proprioception in arms and legs.   Station and Gait: Deferred for bed rest    Coordination: Finger to nose test shows no dysmetria.  Rapid alternating movements are normal.  Heel to shin normal.    NIHSS:     Diagnoses / Discussion:   1) Left subcortical stroke - Patient with right face/arm/leg weakness without sensory consistent with a pure motor lacunar syndrome. MRI head to further evaluate. Not a tpa candidate by time. Aspirin and statin and risk factor management. Patient counseled on exercise and diet after stroke. However, symptoms may warrant PT evaluation for safety/disposition.     Plan:  MRI, PT/OT     Call 911 for stroke any stroke symptoms    I have discussed the above with the patient and family.  Time spent with patient: 40min    MDM  Reviewed: previous chart, nursing note and vitals  Reviewed previous: CT scan and labs  Interpretation: labs and CT scan  Total time providing critical care: 30-74 minutes. This excludes time  spent performing separately reportable procedures and services.         Yanick Smith MD  Neurology

## 2022-08-03 ENCOUNTER — APPOINTMENT (OUTPATIENT)
Dept: MRI IMAGING | Facility: HOSPITAL | Age: 57
End: 2022-08-03

## 2022-08-03 PROBLEM — N17.9 AKI (ACUTE KIDNEY INJURY) (HCC): Status: ACTIVE | Noted: 2022-08-03

## 2022-08-03 PROBLEM — I10 HTN (HYPERTENSION): Status: ACTIVE | Noted: 2022-08-03

## 2022-08-03 PROBLEM — R00.1 BRADYCARDIA: Status: ACTIVE | Noted: 2022-08-03

## 2022-08-03 LAB
ANION GAP SERPL CALCULATED.3IONS-SCNC: 8.7 MMOL/L (ref 5–15)
BASOPHILS # BLD AUTO: 0.07 10*3/MM3 (ref 0–0.2)
BASOPHILS NFR BLD AUTO: 1.1 % (ref 0–1.5)
BH CV ECHO MEAS - ACS: 2.5 CM
BH CV ECHO MEAS - AO MAX PG: 9.2 MMHG
BH CV ECHO MEAS - AO MEAN PG: 4.6 MMHG
BH CV ECHO MEAS - AO ROOT DIAM: 3.2 CM
BH CV ECHO MEAS - AO V2 MAX: 152 CM/SEC
BH CV ECHO MEAS - AO V2 VTI: 31.1 CM
BH CV ECHO MEAS - AVA(I,D): 2.7 CM2
BH CV ECHO MEAS - EDV(CUBED): 154.8 ML
BH CV ECHO MEAS - EDV(MOD-SP2): 40 ML
BH CV ECHO MEAS - EDV(MOD-SP4): 44 ML
BH CV ECHO MEAS - EF(MOD-BP): 62.1 %
BH CV ECHO MEAS - EF(MOD-SP2): 62.5 %
BH CV ECHO MEAS - EF(MOD-SP4): 63.6 %
BH CV ECHO MEAS - ESV(CUBED): 44.7 ML
BH CV ECHO MEAS - ESV(MOD-SP2): 15 ML
BH CV ECHO MEAS - ESV(MOD-SP4): 16 ML
BH CV ECHO MEAS - FS: 33.9 %
BH CV ECHO MEAS - IVS/LVPW: 1.11 CM
BH CV ECHO MEAS - IVSD: 1.03 CM
BH CV ECHO MEAS - LA DIMENSION: 4.1 CM
BH CV ECHO MEAS - LAT PEAK E' VEL: 8.3 CM/SEC
BH CV ECHO MEAS - LV DIASTOLIC VOL/BSA (35-75): 24.2 CM2
BH CV ECHO MEAS - LV MASS(C)D: 198.3 GRAMS
BH CV ECHO MEAS - LV MAX PG: 5.5 MMHG
BH CV ECHO MEAS - LV MEAN PG: 2.7 MMHG
BH CV ECHO MEAS - LV SYSTOLIC VOL/BSA (12-30): 8.8 CM2
BH CV ECHO MEAS - LV V1 MAX: 116.9 CM/SEC
BH CV ECHO MEAS - LV V1 VTI: 24.5 CM
BH CV ECHO MEAS - LVIDD: 5.4 CM
BH CV ECHO MEAS - LVIDS: 3.5 CM
BH CV ECHO MEAS - LVOT AREA: 3.5 CM2
BH CV ECHO MEAS - LVOT DIAM: 2.1 CM
BH CV ECHO MEAS - LVPWD: 0.93 CM
BH CV ECHO MEAS - MED PEAK E' VEL: 7.3 CM/SEC
BH CV ECHO MEAS - MR MAX PG: 53.7 MMHG
BH CV ECHO MEAS - MR MAX VEL: 366.5 CM/SEC
BH CV ECHO MEAS - MV A DUR: 0.15 SEC
BH CV ECHO MEAS - MV A MAX VEL: 69 CM/SEC
BH CV ECHO MEAS - MV DEC SLOPE: 206.8 CM/SEC2
BH CV ECHO MEAS - MV DEC TIME: 0.38 MSEC
BH CV ECHO MEAS - MV E MAX VEL: 55.3 CM/SEC
BH CV ECHO MEAS - MV E/A: 0.8
BH CV ECHO MEAS - MV MAX PG: 2.5 MMHG
BH CV ECHO MEAS - MV MEAN PG: 0.76 MMHG
BH CV ECHO MEAS - MV P1/2T: 100.9 MSEC
BH CV ECHO MEAS - MV V2 VTI: 32.4 CM
BH CV ECHO MEAS - MVA(P1/2T): 2.18 CM2
BH CV ECHO MEAS - MVA(VTI): 2.6 CM2
BH CV ECHO MEAS - PA ACC TIME: 0.04 SEC
BH CV ECHO MEAS - PA PR(ACCEL): 62.4 MMHG
BH CV ECHO MEAS - PA V2 MAX: 79.3 CM/SEC
BH CV ECHO MEAS - QP/QS: 0.85
BH CV ECHO MEAS - RAP SYSTOLE: 3 MMHG
BH CV ECHO MEAS - RV MAX PG: 1.26 MMHG
BH CV ECHO MEAS - RV V1 MAX: 56.1 CM/SEC
BH CV ECHO MEAS - RV V1 VTI: 11.5 CM
BH CV ECHO MEAS - RVOT DIAM: 2.8 CM
BH CV ECHO MEAS - RVSP: 32.5 MMHG
BH CV ECHO MEAS - SI(MOD-SP2): 13.7 ML/M2
BH CV ECHO MEAS - SI(MOD-SP4): 15.4 ML/M2
BH CV ECHO MEAS - SV(LVOT): 84.8 ML
BH CV ECHO MEAS - SV(MOD-SP2): 25 ML
BH CV ECHO MEAS - SV(MOD-SP4): 28 ML
BH CV ECHO MEAS - SV(RVOT): 72 ML
BH CV ECHO MEAS - TR MAX PG: 29.5 MMHG
BH CV ECHO MEAS - TR MAX VEL: 271.5 CM/SEC
BH CV ECHO MEASUREMENTS AVERAGE E/E' RATIO: 7.09
BH CV XLRA - RV BASE: 2.34 CM
BH CV XLRA - RV LENGTH: 6.3 CM
BH CV XLRA - RV MID: 2.6 CM
BH CV XLRA - TDI S': 8.7 CM/SEC
BUN SERPL-MCNC: 20 MG/DL (ref 6–20)
BUN/CREAT SERPL: 22.2 (ref 7–25)
CALCIUM SPEC-SCNC: 8.5 MG/DL (ref 8.6–10.5)
CHLORIDE SERPL-SCNC: 111 MMOL/L (ref 98–107)
CHOLEST SERPL-MCNC: 127 MG/DL (ref 0–200)
CO2 SERPL-SCNC: 21.3 MMOL/L (ref 22–29)
CREAT SERPL-MCNC: 0.9 MG/DL (ref 0.57–1)
D DIMER PPP FEU-MCNC: 0.46 MCGFEU/ML (ref 0–0.49)
DEPRECATED RDW RBC AUTO: 42.4 FL (ref 37–54)
EGFRCR SERPLBLD CKD-EPI 2021: 73.8 ML/MIN/1.73
EOSINOPHIL # BLD AUTO: 0.4 10*3/MM3 (ref 0–0.4)
EOSINOPHIL NFR BLD AUTO: 6.3 % (ref 0.3–6.2)
ERYTHROCYTE [DISTWIDTH] IN BLOOD BY AUTOMATED COUNT: 12.7 % (ref 12.3–15.4)
GLUCOSE SERPL-MCNC: 94 MG/DL (ref 65–99)
HBA1C MFR BLD: 5.5 % (ref 4.8–5.6)
HCT VFR BLD AUTO: 40.5 % (ref 34–46.6)
HDLC SERPL-MCNC: 27 MG/DL (ref 40–60)
HGB BLD-MCNC: 13.3 G/DL (ref 12–15.9)
IMM GRANULOCYTES # BLD AUTO: 0.03 10*3/MM3 (ref 0–0.05)
IMM GRANULOCYTES NFR BLD AUTO: 0.5 % (ref 0–0.5)
LDLC SERPL CALC-MCNC: 77 MG/DL (ref 0–100)
LDLC/HDLC SERPL: 2.76 {RATIO}
LEFT ATRIUM VOLUME INDEX: 27.4 ML/M2
LYMPHOCYTES # BLD AUTO: 2.2 10*3/MM3 (ref 0.7–3.1)
LYMPHOCYTES NFR BLD AUTO: 34.8 % (ref 19.6–45.3)
MAXIMAL PREDICTED HEART RATE: 161 BPM
MCH RBC QN AUTO: 30.1 PG (ref 26.6–33)
MCHC RBC AUTO-ENTMCNC: 32.8 G/DL (ref 31.5–35.7)
MCV RBC AUTO: 91.6 FL (ref 79–97)
MONOCYTES # BLD AUTO: 0.43 10*3/MM3 (ref 0.1–0.9)
MONOCYTES NFR BLD AUTO: 6.8 % (ref 5–12)
NEUTROPHILS NFR BLD AUTO: 3.19 10*3/MM3 (ref 1.7–7)
NEUTROPHILS NFR BLD AUTO: 50.5 % (ref 42.7–76)
NRBC BLD AUTO-RTO: 0 /100 WBC (ref 0–0.2)
PLATELET # BLD AUTO: 170 10*3/MM3 (ref 140–450)
PMV BLD AUTO: 10.6 FL (ref 6–12)
POTASSIUM SERPL-SCNC: 4 MMOL/L (ref 3.5–5.2)
RBC # BLD AUTO: 4.42 10*6/MM3 (ref 3.77–5.28)
SINUS: 3.2 CM
SODIUM SERPL-SCNC: 141 MMOL/L (ref 136–145)
STRESS TARGET HR: 137 BPM
TRIGL SERPL-MCNC: 128 MG/DL (ref 0–150)
TSH SERPL DL<=0.05 MIU/L-ACNC: 2.73 UIU/ML (ref 0.27–4.2)
VLDLC SERPL-MCNC: 23 MG/DL (ref 5–40)
WBC NRBC COR # BLD: 6.32 10*3/MM3 (ref 3.4–10.8)

## 2022-08-03 PROCEDURE — 85025 COMPLETE CBC W/AUTO DIFF WBC: CPT | Performed by: HOSPITALIST

## 2022-08-03 PROCEDURE — G0378 HOSPITAL OBSERVATION PER HR: HCPCS

## 2022-08-03 PROCEDURE — 70549 MR ANGIOGRAPH NECK W/O&W/DYE: CPT

## 2022-08-03 PROCEDURE — 84443 ASSAY THYROID STIM HORMONE: CPT | Performed by: HOSPITALIST

## 2022-08-03 PROCEDURE — 90791 PSYCH DIAGNOSTIC EVALUATION: CPT

## 2022-08-03 PROCEDURE — 97535 SELF CARE MNGMENT TRAINING: CPT

## 2022-08-03 PROCEDURE — 83036 HEMOGLOBIN GLYCOSYLATED A1C: CPT | Performed by: PSYCHIATRY & NEUROLOGY

## 2022-08-03 PROCEDURE — 97166 OT EVAL MOD COMPLEX 45 MIN: CPT

## 2022-08-03 PROCEDURE — 99214 OFFICE O/P EST MOD 30 MIN: CPT | Performed by: PHYSICIAN ASSISTANT

## 2022-08-03 PROCEDURE — 70544 MR ANGIOGRAPHY HEAD W/O DYE: CPT

## 2022-08-03 PROCEDURE — A9577 INJ MULTIHANCE: HCPCS | Performed by: INTERNAL MEDICINE

## 2022-08-03 PROCEDURE — 80061 LIPID PANEL: CPT | Performed by: PSYCHIATRY & NEUROLOGY

## 2022-08-03 PROCEDURE — 85379 FIBRIN DEGRADATION QUANT: CPT | Performed by: INTERNAL MEDICINE

## 2022-08-03 PROCEDURE — 97161 PT EVAL LOW COMPLEX 20 MIN: CPT

## 2022-08-03 PROCEDURE — 0 GADOBENATE DIMEGLUMINE 529 MG/ML SOLUTION: Performed by: INTERNAL MEDICINE

## 2022-08-03 PROCEDURE — 70551 MRI BRAIN STEM W/O DYE: CPT

## 2022-08-03 PROCEDURE — 36415 COLL VENOUS BLD VENIPUNCTURE: CPT | Performed by: HOSPITALIST

## 2022-08-03 PROCEDURE — 96361 HYDRATE IV INFUSION ADD-ON: CPT

## 2022-08-03 PROCEDURE — 80048 BASIC METABOLIC PNL TOTAL CA: CPT | Performed by: HOSPITALIST

## 2022-08-03 RX ADMIN — VALSARTAN 160 MG: 160 TABLET, FILM COATED ORAL at 22:54

## 2022-08-03 RX ADMIN — GADOBENATE DIMEGLUMINE 15 ML: 529 INJECTION, SOLUTION INTRAVENOUS at 20:48

## 2022-08-03 RX ADMIN — CHOLECALCIFEROL TAB 10 MCG (400 UNIT) 400 UNITS: 10 TAB at 17:49

## 2022-08-03 RX ADMIN — HYDRALAZINE HYDROCHLORIDE 25 MG: 25 TABLET, FILM COATED ORAL at 08:32

## 2022-08-03 RX ADMIN — Medication 10 ML: at 22:54

## 2022-08-03 RX ADMIN — HYDRALAZINE HYDROCHLORIDE 25 MG: 25 TABLET, FILM COATED ORAL at 17:50

## 2022-08-03 RX ADMIN — HYDRALAZINE HYDROCHLORIDE 25 MG: 25 TABLET, FILM COATED ORAL at 22:54

## 2022-08-03 RX ADMIN — ATORVASTATIN CALCIUM 80 MG: 80 TABLET, FILM COATED ORAL at 21:55

## 2022-08-03 RX ADMIN — Medication 10 ML: at 08:33

## 2022-08-03 RX ADMIN — ASPIRIN 81 MG: 81 TABLET, CHEWABLE ORAL at 08:33

## 2022-08-03 RX ADMIN — SODIUM CHLORIDE 125 ML/HR: 9 INJECTION, SOLUTION INTRAVENOUS at 12:47

## 2022-08-03 NOTE — PROGRESS NOTES
[facility]  Stroke screening per stroke order set via Epic. Please note that this is a screening. If you feel that this pt is or will be appropriate for Acute Rehab, please contact the Admission Office at [phone] and a full evaluation will be initiated.     Thank You  Mariana Forbes RN  Acute Rehab Admission Nurse

## 2022-08-03 NOTE — PLAN OF CARE
Goal Outcome Evaluation:              Outcome Evaluation: Patient passed RN swallow screen and is tolerating a regular diet. Admitted with R sided weakness. No report of speech/language/cognitive changes. SLP to sign off. Please re consult if current status changes.

## 2022-08-03 NOTE — PLAN OF CARE
Goal Outcome Evaluation:  Plan of Care Reviewed With: patient           Outcome Evaluation: Pt. is currently independent with functional mobility and feels back to baseline.  Pt. with no further questions/concerns regarding functional mobility or home safety.  Encouraged pt. to continue ambulation with nursing staff while here in the hospital. Otherwise, P.T. will sign off.    Patient was wearing a face mask during this therapy encounter. Therapist used appropriate personal protective equipment including eye protection, mask, and gloves.  Mask used was standard procedure mask. Appropriate PPE was worn during the entire therapy session. Hand hygiene was completed before and after therapy session. Patient is not in enhanced droplet precautions.

## 2022-08-03 NOTE — THERAPY EVALUATION
Patient Name: Emy Medina  : 1965    MRN: [id number]                              Today's Date: 8/3/2022       Admit Date: 2022    Visit Dx:     ICD-10-CM ICD-9-CM   1. Cerebrovascular accident (CVA), unspecified mechanism (HCC)  I63.9 434.91     Patient Active Problem List   Diagnosis   • Chest pain   • Cerebrovascular accident (CVA) (HCC)     Past Medical History:   Diagnosis Date   • Arthritis    • Hypertension    • PE (pulmonary embolism)    • Pleurisy      Past Surgical History:   Procedure Laterality Date   • CATARACT EXTRACTION     • HYSTERECTOMY     • LASIK     • SINUS SURGERY        General Information     Row Name 22 1256          OT Time and Intention    Document Type discharge evaluation/summary  -     Mode of Treatment physical therapy;occupational therapy;co-treatment  -     Row Name 22 1256          General Information    Patient Profile Reviewed yes  -JW     Prior Level of Function independent:;ADL's;all household mobility  indep with ADLs and fxl mobility using a cane occasionally only with increased back pain.  -     Existing Precautions/Restrictions fall  -     Barriers to Rehab none identified  -     Row Name 22 1256          Living Environment    People in Home spouse  -     Row Name 22 1256          Cognition    Orientation Status (Cognition) oriented x 3  -           User Key  (r) = Recorded By, (t) = Taken By, (c) = Cosigned By    Initials Name Provider Type    Sol Almeida OT Occupational Therapist                 Mobility/ADL's     Row Name 22 1257          Bed Mobility    Bed Mobility supine-sit  -     Supine-Sit [place] (Bed Mobility) independent  -     Comment, (Bed Mobility) left UIC  -     Row Name 22 1257          Transfers    Transfers toilet transfer;sit-stand transfer  -     Sit-Stand [place] (Transfers) independent  -     [place] Level (Toilet Transfer) modified independence  -      Assistive Device (Toilet Transfer) grab bars/safety frame  -[facility] Name 08/03/22 1257          Toilet Transfer    Type (Toilet Transfer) stand pivot/stand step  -[facility] Name 08/03/22 1257          Functional Mobility    Functional Mobility- Ind. Level independent  -     Functional Mobility- Comment fxl ambulation around room and unit w/o AD to simulate household mobility, no LOB.  -[facility] Name 08/03/22 1257          Activities of Daily Living    BADL Assessment/Intervention toileting;grooming  -[facility] Name 08/03/22 1257          Toileting Assessment/Training    [place] Level (Toileting) toileting skills;modified independence  -     Assistive Devices (Toileting) grab bar/safety frame  -[facility] Name 08/03/22 1257          Grooming Assessment/Training    [place] Level (Grooming) grooming skills;oral care regimen;wash face, hands;independent  -     Position (Grooming) sink side;unsupported standing  -     Comment, (Grooming) standing at sink to complete grooming tasks  -           User Key  (r) = Recorded By, (t) = Taken By, (c) = Cosigned By    Initials Name Provider Type     Sol Meyers OT Occupational Therapist               Obj/Interventions     [facility] Name 08/03/22 1300          Sensory Assessment (Somatosensory)    Sensory Assessment (Somatosensory) UE sensation intact  -[facility] Name 08/03/22 1300          Vision Assessment/Intervention    Visual Impairment/Limitations [facility] Name 08/03/22 1300          Range of Motion Comprehensive    General Range of Motion bilateral upper extremity ROM [facility] Name 08/03/22 1300          Strength Comprehensive (MMT)    Comment, General Manual Muscle Testing (MMT) Assessment UB strength [facility] Name 08/03/22 1300          Motor Skills    Motor Skills coordination;functional endurance  -     Coordination [facility]     Functional Endurance good  -[facility] Name 08/03/22 1300          Balance    Balance  Assessment sitting static balance;sitting dynamic balance;standing static balance;standing dynamic balance  -     Static Sitting Balance independent  -     Dynamic Sitting Balance independent  -     Position, Sitting Balance sitting edge of bed  -     Static Standing Balance independent  -     Dynamic Standing Balance independent  -     Balance Interventions sitting;standing;occupation based/functional task  -           User Key  (r) = Recorded By, (t) = Taken By, (c) = Cosigned By    Initials Name Provider Type     Sol Meyers OT Occupational Therapist               Goals/Plan     Row Name 08/03/22 1304          Problem Specific Goal 1 (OT)    Problem Specific Goal 1 (OT) Pt will be indep with ADLs and fxl mobility prior to d/c  -JW     Time Frame (Problem Specific Goal 1, OT) short term goal (STG);2 weeks  -JW     Progress/Outcome (Problem Specific Goal 1, OT) goal ongoing  -           User Key  (r) = Recorded By, (t) = Taken By, (c) = Cosigned By    Initials Name Provider Type     Sol Meyers OT Occupational Therapist               Clinical Impression     Row Name 08/03/22 1301          Pain Assessment    Pretreatment Pain Rating 4/10  -     Posttreatment Pain Rating 4/10  -     Pain Location - head  -     Pre/Posttreatment Pain Comment headache  -     Row Name 08/03/22 1301          Plan of Care Review    Plan of Care Reviewed With patient  -     Outcome Evaluation Pt is a 57 y.o. female who presents to the ED c/o right arm and right leg weakness. CT negative, MRI pending. Pt lives with her spouse, reports being indep with ADLs and fxl mobility with occasional use of SPC when her back is hurting. Presents today AOx3, is independent with bed mobility, fxl mobility in room and hallway w/o AD, no LOB. She completes toileting tasks and grooming tasks w/o difficulty. UE coordination, strength, AROM, and vision WFL. No OT needs identified at this time, will sign off and recommend d/c  home.  -     Row Name 08/03/22 1301          Therapy Assessment/Plan (OT)    Criteria for Skilled Therapeutic Interventions Met (OT) no;no problems identified which require skilled intervention  -     Therapy Frequency (OT) evaluation only  -     Row Name 08/03/22 1301          Therapy Plan Review/Discharge Plan (OT)    Anticipated Discharge Disposition (OT) home  -     Row Name 08/03/22 1301          Positioning and Restraints    Pre-Treatment Position in bed  -JW     Post Treatment Position chair  -JW     In Chair sitting;call light within reach;encouraged to call for assist;exit alarm on  -JW           User Key  (r) = Recorded By, (t) = Taken By, (c) = Cosigned By    Initials Name Provider Type    JW Sol Meyers, NINA Occupational Therapist               Outcome Measures     Row Name 08/03/22 1310          How much help from another is currently needed...    Putting on and taking off regular lower body clothing? 4  -JW     Bathing (including washing, rinsing, and drying) 4  -JW     Toileting (which includes using toilet bed pan or urinal) 4  -JW     Putting on and taking off regular upper body clothing 4  -JW     Taking care of personal grooming (such as brushing teeth) 4  -JW     Eating meals 4  -JW     AM-PAC 6 Clicks Score (OT) 24  -JW     Row Name 08/03/22 1141          How much help from another person do you currently need...    Turning from your back to your side while in flat bed without using bedrails? 4  -MS     Moving from lying on back to sitting on the side of a flat bed without bedrails? 4  -MS     Moving to and from a bed to a chair (including a wheelchair)? 4  -MS     Standing up from a chair using your arms (e.g., wheelchair, bedside chair)? 4  -MS     Climbing 3-5 steps with a railing? 4  -MS     To walk in hospital room? 4  -MS     AM-PAC 6 Clicks Score (PT) 24  -MS     Highest level of mobility 8 --> Walked 250 feet or more  -MS     Row Name 08/03/22 1310          Modified [place] Scale     Modified [place] Scale 1 - No significant disability despite symptoms.  Able to carry out all usual duties and activities.  -     Row Name 08/03/22 1310 08/03/22 1141       Functional Assessment    Outcome Measure Options AM-PAC 6 Clicks Daily Activity (OT);Modified [place]  - AM-PAC 6 Clicks Basic Mobility (PT)  -MS          User Key  (r) = Recorded By, (t) = Taken By, (c) = Cosigned By    Initials Name Provider Type    Ted Winter, PT Physical Therapist    Sol Almeida OT Occupational Therapist                Occupational Therapy Education                 Title: PT OT SLP Therapies (Done)     Topic: Occupational Therapy (Done)     Point: ADL training (Done)     Description:   Instruct learner(s) on proper safety adaptation and remediation techniques during self care or transfers.   Instruct in proper use of assistive devices.              Learning Progress Summary           Patient Acceptance, E, VU by  at 8/3/2022 1310                   Point: Home exercise program (Done)     Description:   Instruct learner(s) on appropriate technique for monitoring, assisting and/or progressing therapeutic exercises/activities.              Learning Progress Summary           Patient Acceptance, E, VU by  at 8/3/2022 1310                   Point: Precautions (Done)     Description:   Instruct learner(s) on prescribed precautions during self-care and functional transfers.              Learning Progress Summary           Patient Acceptance, E, VU by  at 8/3/2022 1310                   Point: Body mechanics (Done)     Description:   Instruct learner(s) on proper positioning and spine alignment during self-care, functional mobility activities and/or exercises.              Learning Progress Summary           Patient Acceptance, E, VU by  at 8/3/2022 1310                               User Key     Initials Effective Dates Name Provider Type [facility] 06/10/21 -  Sol Meyers OT Occupational Therapist  OT              OT Recommendation and Plan  Therapy Frequency (OT): evaluation only  Plan of Care Review  Plan of Care Reviewed With: patient  Outcome Evaluation: Pt is a 57 y.o. female who presents to the ED c/o right arm and right leg weakness. CT negative, MRI pending. Pt lives with her spouse, reports being indep with ADLs and fxl mobility with occasional use of SPC when her back is hurting. Presents today AOx3, is independent with bed mobility, fxl mobility in room and hallway w/o AD, no LOB. She completes toileting tasks and grooming tasks w/o difficulty. UE coordination, strength, AROM, and vision WFL. No OT needs identified at this time, will sign off and recommend d/c home.     Time Calculation:    Time Calculation- OT     Row Name 08/03/22 1310             Time Calculation- OT    OT Start Time 0838  -      OT Stop Time 0853  -      OT Time Calculation (min) 15 min  -      Total Timed Code Minutes- OT 8 minute(s)  -JW      OT Received On 08/03/22  -              Timed Charges    30397 - OT Self Care/Mgmt Minutes 8  -JW              Untimed Charges    OT Eval/Re-eval Minutes 7  -JW              Total Minutes    Timed Charges Total Minutes 8  -JW      Untimed Charges Total Minutes 7  -JW       Total Minutes 15  -JW            User Key  (r) = Recorded By, (t) = Taken By, (c) = Cosigned By    Initials Name Provider Type    Sol Almeida OT Occupational Therapist              Therapy Charges for Today     Code Description Service Date Service Provider Modifiers Qty    50532784329 HC OT SELF CARE/MGMT/TRAIN EA 15 MIN 8/3/2022 Sol Meyers OT GO 1    84930565015 HC OT EVAL MOD COMPLEXITY 2 8/3/2022 Sol Meyers OT GO 1               Sol Meyers OT  8/3/2022

## 2022-08-03 NOTE — PROGRESS NOTES
"DOS: 8/3/2022  NAME: Emy Medina   : 1965  PCP: Clem Bush MD  Chief Complaint   Patient presents with   • Arm Problem   • Weakness - Generalized       Chief complaint: R sided symptoms  Subjective: Patient describes to me her right-sided symptoms.  She is a started on Saturday.  The heaviness worsened over the course of the next day.  She thinks that her weakness is about the same.  She describes it as feeling heavy.  No sensory changes.  She has not had issues like this.  In the past.  Problems with speech, vision, headache.  She does tell me that she was seen  [facility] optometrist who thinks she is having ocular migraines without head pain issues and having frequent bouts of squiggly lines in her vision.  She is not having any of that brain    Objective:  Vital signs: /83 (BP Location: Right arm, Patient Position: Lying)   Pulse (!) 46   Temp 97.8 °F (36.6 °C) (Oral)   Resp 18   Ht 167.6 cm (66\")   Wt 72.6 kg (160 lb)   SpO2 95%   BMI 25.82 kg/m²      Gen: NAD, vitals reviewed  MS: oriented x3, recent/remote memory intact, normal attention/concentration, language intact, no neglect.  CN: visual acuity grossly normal, PERRL, EOMI, nasolabial fold on right, no dysarthria  Motor: Subtle stroke arm and right leg normal tone  Sensory: intact to light touch all 4 ext.  Reflexes: hypereflexive throughout, mute plantars    ROS:  No weakness, numbness  No fevers, chills      Laboratory results:  Lab Results   Component Value Date    GLUCOSE 94 2022    CALCIUM 8.5 (L) 2022     2022    K 4.0 2022    CO2 21.3 (L) 2022     (H) 2022    BUN 20 2022    CREATININE 0.90 2022    EGFRIFNONA 83 2022    BCR 22.2 2022    ANIONGAP 8.7 2022     Lab Results   Component Value Date    WBC 6.32 2022    HGB 13.3 2022    HCT 40.5 2022    MCV 91.6 2022     2022     Lab Results   Component Value Date "    LDL 77 08/03/2022     06/04/2020    LDL 87 04/01/2019         Lab 08/03/22  0531   HEMOGLOBIN A1C 5.50        Review of labs:     Review and interpretation of imaging:  HCT - for Acute finding    Workup to date:  · Calculated left ventricular EF = 62.1% Estimated left ventricular EF was in agreement with the calculated left ventricular EF. Left ventricular systolic function is normal. Normal left ventricular cavity size noted. Left ventricular wall thickness is consistent with moderate basal asymmetric hypertrophy. All left ventricular wall segments contract normally. Left ventricular diastolic function is consistent with (grade I) impaired relaxation.  · The left atrial cavity is mildly dilated. Saline test results are negative.        Diagnoses:  1. R hemiparesis  2. H/o provoked DVT/PE  3. HTN    Impression: 57-year-old female with history of hypertension, remote history of PE no longer on anticoagulation who presented with several day of worsening right-sided weakness.  She has subtle weakness demonstrated by drift and flattening of nasolabial fold.  Concern for small vessel CVA.  She was outside window for tPA.  She does also describe visual scotomas happening frequently but denies headache.  It has been suggested by her eye doctor that she is having ocular migraines and so this is on the differential.    Plan:  1. MRI brain, MRA head/neck  2. ASA, statin  3. BP control- no specific inpt parameters in light that onset was days ago and stable exam  4. Neuro checks, if change in exam notify on call neurologist    I spent at least 30 minutes interviewing, examining, and counseling patient.  I independently reviewed documentation, laboratory and diagnostic findings, external documentation where applicable, and formulated treatment plan which was discussed with the patient.      Thank you for this consultation.  Discussed above plan with neuro attending, Dr. Smith who agrees with above plan.  Neurology team  is available for concerns or questions.

## 2022-08-03 NOTE — THERAPY DISCHARGE NOTE
Patient Name: Emy Medina  : 1965    MRN: [id number]                              Today's Date: 8/3/2022       Admit Date: 2022    Visit Dx:     ICD-10-CM ICD-9-CM   1. Cerebrovascular accident (CVA), unspecified mechanism (HCC)  I63.9 434.91     Patient Active Problem List   Diagnosis   • Chest pain   • Cerebrovascular accident (CVA) (HCC)     Past Medical History:   Diagnosis Date   • Arthritis    • Hypertension    • PE (pulmonary embolism)    • Pleurisy      Past Surgical History:   Procedure Laterality Date   • CATARACT EXTRACTION     • HYSTERECTOMY     • LASIK     • SINUS SURGERY        General Information     Row Name 22 1139          Physical Therapy Time and Intention    Document Type discharge evaluation/summary  -MS     Mode of Treatment physical therapy;occupational therapy;co-treatment  -MS     Row Name 22 1139          General Information    Patient Profile Reviewed yes  -MS     Prior Level of Function independent:  -MS     Barriers to Rehab none identified  -MS     Row Name 22 1139          Cognition    Orientation Status (Cognition) oriented x 3  -MS     Row Name 22 1139          Safety Issues, Functional Mobility    Comment, Safety Issues/Impairments (Mobility) Gait belt used for safety.  -MS           User Key  (r) = Recorded By, (t) = Taken By, (c) = Cosigned By    Initials Name Provider Type    Ted Winter, PT Physical Therapist               Mobility     Row Name 22 1139          Bed Mobility    Bed Mobility supine-sit;sit-supine  -MS     Supine-Sit [place] (Bed Mobility) independent  -MS     Row Name 22 1139          Sit-Stand Transfer    Sit-Stand [place] (Transfers) independent  -MS     Row Name 22 1139          Gait/Stairs (Locomotion)    [place] Level (Gait) independent  -MS     Distance in Feet (Gait) 150 feet  -MS     Comment, (Gait/Stairs) No balance deficits noted.  -MS           User Key  (r) = Recorded By,  "(t) = Taken By, (c) = Cosigned By    Initials Name Provider Type    Ted Winter, PT Physical Therapist               Obj/Interventions     Row Name 08/03/22 1140          Range of Motion Comprehensive    Comment, General Range of Motion BLE (WFL's)  -MS     Row Name 08/03/22 1140          Strength Comprehensive (MMT)    Comment, General Manual Muscle Testing (MMT) Assessment BLE (4/5)  -MS           User Key  (r) = Recorded By, (t) = Taken By, (c) = Cosigned By    Initials Name Provider Type    Ted Winter, PT Physical Therapist               Goals/Plan    No documentation.                Clinical Impression     [facility] Name 08/03/22 1140          Pain    Pretreatment Pain Rating 4/10  -MS     Posttreatment Pain Rating 4/10  -MS     Pain Location - head  -MS     Pre/Posttreatment Pain Comment \"headache\" per pt. report  -MS     Pain Intervention(s) Medication (See MAR);Nursing Notified;Repositioned  -MS     Row Name 08/03/22 1140          Plan of Care Review    Plan of Care Reviewed With patient  -MS     [facility] Name 08/03/22 1140          Therapy Assessment/Plan (PT)    Criteria for Skilled Interventions Met (PT) no problems identified which require skilled intervention  -MS     Row Name 08/03/22 1140          Positioning and Restraints    Pre-Treatment Position in bed  -MS     Post Treatment Position chair  -MS     In Chair notified nsg;sitting;call light within reach;encouraged to call for assist;exit alarm on  All lines intact.  -MS           User Key  (r) = Recorded By, (t) = Taken By, (c) = Cosigned By    Initials Name Provider Type    Ted Winter, PT Physical Therapist               Outcome Measures     Row Name 08/03/22 1141          How much help from another person do you currently need...    Turning from your back to your side while in flat bed without using bedrails? 4  -MS     Moving from lying on back to sitting on the side of a flat bed without bedrails? 4  -MS     Moving to and from " a bed to a chair (including a wheelchair)? 4  -MS     Standing up from a chair using your arms (e.g., wheelchair, bedside chair)? 4  -MS     Climbing 3-5 steps with a railing? 4  -MS     To walk in hospital room? 4  -MS     AM-PAC 6 Clicks Score (PT) 24  -MS     Highest level of mobility 8 --> Walked 250 feet or more  -MS     Row Name 08/03/22 1141          Functional Assessment    Outcome Measure Options AM-PAC 6 Clicks Basic Mobility (PT)  -MS           User Key  (r) = Recorded By, (t) = Taken By, (c) = Cosigned By    Initials Name Provider Type    MS Ted Ramirez, PT Physical Therapist              Physical Therapy Education                 Title: PT OT SLP Therapies (Resolved)     Topic: Physical Therapy (Resolved)     Point: Mobility training (Resolved)     Learning Progress Summary           Patient Acceptance, D,E, VU,DU by MS at 8/3/2022 1141                   Point: Body mechanics (Resolved)     Learning Progress Summary           Patient Acceptance, D,E, VU,DU by MS at 8/3/2022 1141                   Point: Precautions (Resolved)     Learning Progress Summary           Patient Acceptance, D,E, VU,DU by MS at 8/3/2022 1141                               User Key     Initials Effective Dates Name Provider Type Discipline    MS 06/16/21 -  Ted Ramirez PT Physical Therapist PT              PT Recommendation and Plan     Plan of Care Reviewed With: patient  Outcome Evaluation: Pt. is currently independent with functional mobility and feels back to baseline.  Pt. with no further questions/concerns regarding functional mobility or home safety.  Encouraged pt. to continue ambulation with nursing staff while here in the hospital. Otherwise, P.T. will sign off.     Time Calculation:    PT Charges     Row Name 08/03/22 1143             Time Calculation    Start Time 0838  -MS      Stop Time 0854  -MS      Time Calculation (min) 16 min  -MS      PT Received On 08/03/22  -MS              Time Calculation- PT     Total Timed Code Minutes- PT 15 minute(s)  -MS            User Key  (r) = Recorded By, (t) = Taken By, (c) = Cosigned By    Initials Name Provider Type    Ted Winter, PT Physical Therapist              Therapy Charges for Today     Code Description Service Date Service Provider Modifiers Qty    66477169690 HC PT EVAL LOW COMPLEXITY 1 8/3/2022 Ted Ramirez, PT GP 1          PT G-Codes  Outcome Measure Options: AM-PAC 6 Clicks Basic Mobility (PT)  AM-PAC 6 Clicks Score (PT): 24    PT Discharge Summary  Anticipated Discharge Disposition (PT): home  Reason for Discharge: Independent, At baseline function  Discharge Destination: Home    Ted Ramirez, PT  8/3/2022

## 2022-08-03 NOTE — CASE MANAGEMENT/SOCIAL WORK
Discharge Planning Assessment  [facility]     Patient Name: Emy Medina  MRN: [id number]  Today's Date: 8/3/2022    Admit Date: 8/2/2022     Discharge Needs Assessment     Row Name 08/03/22 1704       Living Environment    People in Home spouse    Current Living Arrangements home    Primary Care Provided by self    Provides Primary Care For no one    Family Caregiver if Needed spouse    Quality of Family Relationships helpful;supportive;involved    Able to Return to Prior Arrangements yes       Resource/Environmental Concerns    Resource/Environmental Concerns none       Transition Planning    Patient/Family Anticipates Transition to home with family    Patient/Family Anticipated Services at Transition none    Transportation Anticipated family or friend will provide;car, drives self       Discharge Needs Assessment    Readmission Within the Last 30 Days no previous admission in last 30 days    Equipment Currently Used at Home cane, straight    Concerns to be Addressed denies needs/concerns at this time    Anticipated Changes Related to Illness none    Provided Post Acute Provider List? N/A    Provided Post Acute Provider Quality & Resource List? N/A               Discharge Plan     Row Name 08/03/22 1706       Plan    Plan Home with spouse    Plan Comments S/W pt at bedside.  Facesheet and pharmacy info confirmed.  Pt lives in a 1 story house with her spouse Samuel, is IADLs and can drive.  Her only home DME is a cane.  No hx of HH or SNF.  Pt is ambulating without difficulty and plans to return home upon DC.  Pt did enroll in Meds to Bed.  Await MRI results.  CCP will follow and assist w/ DC plans if needed. ...........Desi MONROY/ EARLE              Continued Care and Services - Admitted Since 8/2/2022    Coordination has not been started for this encounter.          Demographic Summary     Row Name 08/03/22 1703       General Information    Admission Type observation    Arrived From home    Referral Source  admission list    Reason for Consult discharge planning    Preferred Language English               Functional Status     Row Name 08/03/22 9488       Functional Status    Usual Activity Tolerance good    Current Activity Tolerance good       Functional Status, IADL    Medications independent    Meal Preparation independent;assistive person    Housekeeping independent;assistive person    Laundry independent;assistive person    Shopping independent;assistive person       Mental Status    General Appearance WDL WDL       Mental Status Summary    Recent Changes in Mental Status/Cognitive Functioning no changes       Employment/    Employment Status employed full-time                         Desi Sandoval RN

## 2022-08-03 NOTE — CONSULTS
"ACCESS consulted regarding depression:    Pt was seen by [facility] Clinician due to depression. RN reports pt has been tearful and stated feeling \"useless\" due to medical issues. Pt is a 57 y.o. M/W/F that was admitted to [facility] on 8/2/22 due to c/o right arm and right leg weakness, her cardiologist recommended she go to the emergency room.       Upon entered the room pt RIB. Spouse was in the room. Pt gave consent for spouse to be present for the assessment. Pt appeared A&Ox4. Pt was cooperative and pleasant. Pt was tearful throughout when discussing current stressors. Pt's thought process was generally organized and linear with relevant content. Pt denies SI/HI/AVH at the present time. She rated current depression 6/10 and anxiety 6/10. Pt denies issues with eating. Pt reports since February 2022 struggling with sleeping d/t having increased blood pressure and feeling worried about falling asleep and not waking up.     MENTAL HEALTH HX  Pt states having no prior hx of mental health. She is not under the care of any psychiatric providers at this time. She denied any psych hospitalizations, past suicide attempts/self-harm.  Pt states no knowledge of family hx mental health issues. Pt identified having secondary trauma from childhood and identified bio F suffered from Multiple Sclerosis and was \"violent\" towards other family members in the home \"but not me\". Pt reports feeling increased anxiety and depression since this morning due to feeling worried about not being discharge due to wanting to return home. Pt discussed getting fulfillment in life by helping others and visiting with family and working and feels \"worthless\" since being admitted to the hospital. Pt stated feeling \"hopeless\" over high blood pressure not being resolved stating, \"I do everything to a T the doctors tell me to do and nothing is helping\".      SUBSTANCE USE THX  Pt reports having no prior hx of substance use tx. Pt states last drink was " "over 27 years ago, prior to marriage. When asked to elaborate on what cause pt to no longer drink, pt stated \"I was told years ago, a couple times, that I made some poor choices that I don't remember when I drank and I didn't want to be like that ever again\".     SOCIAL  Pt reports having been  for 26 years to , they have one daughter, Alaina, who is 27 years old. Pt reports daughter moved out of the home over the past year, pt discussed close relationship with daughter. Pt states feeling safe at home and has a \"strong\" marriage. Pt identified other supportive individuals in her life were her coworkers and her sisters. 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 She and her  recently retired from [email or web address] and identified feeling excited about exploring new hobbies.     PLAN  Pt did not present to be a danger to herself or others. Discussed impact of anxiety and depression on blood pressure. This writer provided psychoeducation regarding benefits of therapy and medications for mental health and affects on physical health. Pt declined psychiatry resources, but accepted therapeutic resources which were given to her at the time of the assessment. Encouraged pt to continue to focus on self and rest while doctor's are working to rule out medical issues. Discussed plan with RN.   ACCESS will follow.   "

## 2022-08-03 NOTE — PLAN OF CARE
Pt. VS WNL.  Pt. C/o of headache this a.m. relieved by PO pain meds.  Pt. A&O x4.  NIH-0.   Pt. Anxious and tearful this a.m., access consulted, spiritual care consulted, see notes. Pt. Receiving IVFs.  Pt. Awaiting MRI.  Pt. Up ad penelope.  Pt. With adequate UOP.  Pt. Resting comfortably at present, will continue to monitor closely.        Problem: Adult Inpatient Plan of Care  Goal: Plan of Care Review  Outcome: Ongoing, Progressing  Flowsheets (Taken 8/3/2022 2991)  Progress: improving  Plan of Care Reviewed With: patient

## 2022-08-03 NOTE — PLAN OF CARE
Goal Outcome Evaluation:  Plan of Care Reviewed With: patient     Outcome Evaluation: Pt is a 57 y.o. female who presents to the ED c/o right arm and right leg weakness. CT negative, MRI pending. Pt lives with her spouse, reports being indep with ADLs and fxl mobility with occasional use of SPC when her back is hurting. Presents today AOx3, is independent with bed mobility, fxl mobility in room and hallway w/o AD, no LOB. She completes toileting tasks and grooming tasks w/o difficulty. UE coordination, strength, AROM, and vision WFL. No OT needs identified at this time, will sign off and recommend d/c home.    Hand hygiene completed before and after session. Appropriate PPE worn t/o

## 2022-08-03 NOTE — PROGRESS NOTES
Name: Emy Medina ADMIT: 2022   : 1965  PCP: Clem Bush MD    MRN: [id number] LOS: 0 days   AGE/SEX: 57 y.o. female  ROOM: [facility]     Subjective   Subjective     Patient is lying in the bed and is in no major distress.  Denies nausea, vomiting, abdominal pain, chest pain.  Her right-sided weakness is better today and speech is somewhat slow.     Objective   Objective   Vital Signs  Temp:  [97.6 °F (36.4 °C)-98 °F (36.7 °C)] 97.8 °F (36.6 °C)  Heart Rate:  [46-63] 46  Resp:  [14-18] 18  BP: (123-135)/(74-93) 130/83  SpO2:  [94 %-98 %] 95 %  on  Flow (L/min):  [2] 2;   Device (Oxygen Therapy): room air  Body mass index is 25.82 kg/m².  Physical Exam  Constitutional:       General: She is not in acute distress.  HENT:      Head: Normocephalic and atraumatic.      Mouth/Throat:      Mouth: Mucous membranes are moist.   Eyes:      Extraocular Movements: Extraocular movements intact.      Conjunctiva/sclera: Conjunctivae normal.      Pupils: Pupils are equal, round, and reactive to light.   Cardiovascular:      Rate and Rhythm: Normal rate and regular rhythm.      Pulses: Normal pulses.      Heart sounds: Normal heart sounds. No murmur heard.  Pulmonary:      Effort: Pulmonary effort is normal.      Breath sounds: Normal breath sounds.   Abdominal:      General: Abdomen is flat. Bowel sounds are normal. There is no distension.      Palpations: Abdomen is soft.   Musculoskeletal:      Cervical back: Normal range of motion and neck supple.      Right lower leg: No edema.      Left lower leg: No edema.   Skin:     General: Skin is warm and dry.      Coloration: Skin is not jaundiced.   Neurological:      General: No focal deficit present.      Mental Status: She is alert and oriented to person, place, and time.   Psychiatric:         Mood and Affect: Mood normal.         Behavior: Behavior normal.         Results Review     I reviewed the patient's new clinical results.  Results from last 7 days    Lab Units 08/03/22  0531 08/02/22  1106   WBC 10*3/mm3 6.32 9.70   HEMOGLOBIN g/dL 13.3 16.0*   PLATELETS 10*3/mm3 170 223     Results from last 7 days   Lab Units 08/03/22  0531 08/02/22  1106   SODIUM mmol/L 141 146*   POTASSIUM mmol/L 4.0 5.6*   CHLORIDE mmol/L 111* 110*   CO2 mmol/L 21.3* 25.0   BUN mg/dL 20 29*   CREATININE mg/dL 0.90 1.19*   GLUCOSE mg/dL 94 103*   EGFR mL/min/1.73 73.8 52.8*     Results from last 7 days   Lab Units 08/02/22  1106   ALBUMIN g/dL 4.80   BILIRUBIN mg/dL 0.8   ALK PHOS U/L 69   AST (SGOT) U/L 27   ALT (SGPT) U/L 30     Results from last 7 days   Lab Units 08/03/22  0531 08/02/22  1106   CALCIUM mg/dL 8.5* 10.0   ALBUMIN g/dL  --  4.80       Hemoglobin A1C   Date/Time Value Ref Range Status   08/03/2022 0531 5.50 4.80 - 5.60 % Final       CT Head Without Contrast    Result Date: 8/2/2022  No evidence of acute infarction or of intracranial hemorrhage. The etiology for the patient's right arm and leg weakness is not established. Further evaluation could be performed with an MRI examination of the brain.    Radiation dose reduction techniques were utilized, including automated exposure control and exposure modulation based on body size.  This report was finalized on 8/2/2022 5:33 PM by Dr. Alfonso Rojas M.D.      XR Chest 1 View    Result Date: 8/2/2022  1. No acute process.  This report was finalized on 8/2/2022 11:32 AM by Dr. Barrera Reddy M.D.      Scheduled Medications  aspirin, 81 mg, Oral, Daily   Or  aspirin, 300 mg, Rectal, Daily  atorvastatin, 80 mg, Oral, Nightly  carvedilol, 12.5 mg, Oral, BID  cholecalciferol, 400 Units, Oral, Q24H  hydrALAZINE, 25 mg, Oral, TID  sodium chloride, 10 mL, Intravenous, Q12H  sodium chloride, 10 mL, Intravenous, Q12H  valsartan, 160 mg, Oral, Q24H    Infusions  sodium chloride, 125 mL/hr, Last Rate: 125 mL/hr (08/03/22 1450)    Diet  Diet Regular; Cardiac       Assessment/Plan     Active Hospital Problems    Diagnosis  POA   •  Cerebrovascular accident (CVA) ([facility]) [I63.9]  Yes      Resolved Hospital Problems   No resolved problems to display.     1. Acute CVA, CT brain with no acute findings and CTA of the head and neck did not reveal any acute findings.  MRI of the brain and MRA of the head and neck are still pending.  Currently on aspirin and statins which will be continued.  2D echo revealed normal ejection fraction with no wall motion abnormalities.  2.  Hypertension, on Coreg, hydralazine and valsartan which will be continued.  3.  Hyperkalemia, Aldactone has been held and potassium is back to normal.  4.  Sinus bradycardia, patient is asymptomatic and beta-blocker dose has been cut back.  5.  Acute kidney injury, creatinine has improved from 1.19-0.90.  6.  History of PE, currently not on any anticoagulation.  7.  Depression, patient tearful during conversation for no apparent reason.  Feels low therefore access consultation has been obtained.        Uzair Grier MD  [facility]  08/03/22  17:02 EDT

## 2022-08-03 NOTE — PLAN OF CARE
Problem: Adjustment to Illness (Stroke, Ischemic/Transient Ischemic Attack)  Goal: Optimal Coping  Outcome: Ongoing, Progressing  Intervention: Support Psychosocial Response to Stroke  Flowsheets (Taken 8/2/2022 2054)  Supportive Measures:   active listening utilized   self-reflection promoted   self-care encouraged  Family/Support System Care:   caregiver stress acknowledged   family care conference arranged   involvement promoted   support provided   self-care encouraged     Problem: Bowel Elimination Impaired (Stroke, Ischemic/Transient Ischemic Attack)  Goal: Effective Bowel Elimination  Outcome: Ongoing, Progressing  Intervention: Promote Effective Bowel Elimination  Flowsheets (Taken 8/2/2022 2054)  Bowel Elimination Management: relaxation techniques promoted     Problem: Fall Injury Risk  Goal: Absence of Fall and Fall-Related Injury  Outcome: Ongoing, Progressing  Intervention: Identify and Manage Contributors  Flowsheets (Taken 8/2/2022 2054)  Medication Review/Management: medications reviewed  Intervention: Promote Injury-Free Environment  Flowsheets  Taken 8/2/2022 2204 by Noni Avendano, PCT  Safety Promotion/Fall Prevention:   activity supervised   assistive device/personal items within reach   clutter free environment maintained   fall prevention program maintained   lighting adjusted   nonskid shoes/slippers when out of bed   room organization consistent   safety round/check completed  Taken 8/2/2022 2054 by Alize Pérez, RN  Safety Promotion/Fall Prevention:   activity supervised   clutter free environment maintained   fall prevention program maintained   nonskid shoes/slippers when out of bed   room organization consistent   safety round/check completed     Problem: Adult Inpatient Plan of Care  Goal: Absence of Hospital-Acquired Illness or Injury  Intervention: Identify and Manage Fall Risk  Recent Flowsheet Documentation  Taken 8/2/2022 2054 by Alize Pérez, RN  Safety Promotion/Fall  Prevention:   activity supervised   clutter free environment maintained   fall prevention program maintained   nonskid shoes/slippers when out of bed   room organization consistent   safety round/check completed  Intervention: Prevent Skin Injury  Recent Flowsheet Documentation  Taken 8/2/2022 2054 by Alize Pérez RN  Body Position: position changed independently  Skin Protection:   adhesive use limited   tubing/devices free from skin contact  Intervention: Prevent and Manage VTE (Venous Thromboembolism) Risk  Recent Flowsheet Documentation  Taken 8/2/2022 2054 by Alize Pérez RN  Activity Management: activity adjusted per tolerance  Taken 8/2/2022 2000 by Alize Pérez RN  VTE Prevention/Management:   bilateral   sequential compression devices off   patient refused intervention  Intervention: Prevent Infection  Recent Flowsheet Documentation  Taken 8/2/2022 2054 by Alize Pérez RN  Infection Prevention:   rest/sleep promoted   single patient room provided  Goal: Optimal Comfort and Wellbeing  Intervention: Provide Person-Centered Care  Recent Flowsheet Documentation  Taken 8/2/2022 2054 by Alize Pérez RN  Trust Relationship/Rapport:   care explained   questions answered   reassurance provided   thoughts/feelings acknowledged   questions encouraged     Problem: Cognitive Impairment (Stroke, Ischemic/Transient Ischemic Attack)  Goal: Optimal Cognitive Function  Intervention: Optimize Cognitive Function  Recent Flowsheet Documentation  Taken 8/2/2022 2054 by Alize Pérez RN  Environment Familiarity/Consistency: familiar objects from home provided     Problem: Communication Impairment (Stroke, Ischemic/Transient Ischemic Attack)  Goal: Improved Communication Skills  Intervention: Optimize Communication Skills  Recent Flowsheet Documentation  Taken 8/2/2022 2054 by Alize Pérez RN  Communication Enhancement Strategies: call light answered in person     Problem: Functional Ability Impaired  (Stroke, Ischemic/Transient Ischemic Attack)  Goal: Optimal Functional Ability  Intervention: Optimize Functional Ability  Recent Flowsheet Documentation  Taken 8/2/2022 2054 by Alize Pérez RN  Activity Management: activity adjusted per tolerance     Problem: Respiratory Compromise (Stroke, Ischemic/Transient Ischemic Attack)  Goal: Effective Oxygenation and Ventilation  Intervention: Optimize Oxygenation and Ventilation  Recent Flowsheet Documentation  Taken 8/2/2022 2054 by Alize Pérez RN  Head of Bed (HOB) Positioning: HOB at 20-30 degrees     Problem: Sensorimotor Impairment (Stroke, Ischemic/Transient Ischemic Attack)  Goal: Improved Sensorimotor Function  Intervention: Optimize Range of Motion, Motor Control and Function  Recent Flowsheet Documentation  Taken 8/2/2022 2054 by Alize Pérez RN  Positioning/Transfer Devices:   pillows   in use  Intervention: Optimize Sensory and Perceptual Ability  Recent Flowsheet Documentation  Taken 8/2/2022 2054 by Alize Pérez RN  Pressure Reduction Techniques: frequent weight shift encouraged  Pressure Reduction Devices: alternating pressure pump (ADD)

## 2022-08-04 ENCOUNTER — READMISSION MANAGEMENT (OUTPATIENT)
Dept: CALL CENTER | Facility: HOSPITAL | Age: 57
End: 2022-08-04

## 2022-08-04 VITALS
RESPIRATION RATE: 18 BRPM | DIASTOLIC BLOOD PRESSURE: 62 MMHG | HEART RATE: 61 BPM | BODY MASS INDEX: 25.71 KG/M2 | OXYGEN SATURATION: 96 % | TEMPERATURE: 97.9 F | WEIGHT: 160 LBS | SYSTOLIC BLOOD PRESSURE: 104 MMHG | HEIGHT: 66 IN

## 2022-08-04 LAB
ANION GAP SERPL CALCULATED.3IONS-SCNC: 13.5 MMOL/L (ref 5–15)
BASOPHILS # BLD AUTO: 0.08 10*3/MM3 (ref 0–0.2)
BASOPHILS NFR BLD AUTO: 1.3 % (ref 0–1.5)
BUN SERPL-MCNC: 16 MG/DL (ref 6–20)
BUN/CREAT SERPL: 18.6 (ref 7–25)
CALCIUM SPEC-SCNC: 8.9 MG/DL (ref 8.6–10.5)
CHLORIDE SERPL-SCNC: 110 MMOL/L (ref 98–107)
CO2 SERPL-SCNC: 20.5 MMOL/L (ref 22–29)
CREAT SERPL-MCNC: 0.86 MG/DL (ref 0.57–1)
DEPRECATED RDW RBC AUTO: 42 FL (ref 37–54)
EGFRCR SERPLBLD CKD-EPI 2021: 77.9 ML/MIN/1.73
EOSINOPHIL # BLD AUTO: 0.42 10*3/MM3 (ref 0–0.4)
EOSINOPHIL NFR BLD AUTO: 6.6 % (ref 0.3–6.2)
ERYTHROCYTE [DISTWIDTH] IN BLOOD BY AUTOMATED COUNT: 12.8 % (ref 12.3–15.4)
GLUCOSE SERPL-MCNC: 92 MG/DL (ref 65–99)
HCT VFR BLD AUTO: 42.1 % (ref 34–46.6)
HGB BLD-MCNC: 13.8 G/DL (ref 12–15.9)
IMM GRANULOCYTES # BLD AUTO: 0.02 10*3/MM3 (ref 0–0.05)
IMM GRANULOCYTES NFR BLD AUTO: 0.3 % (ref 0–0.5)
LYMPHOCYTES # BLD AUTO: 1.98 10*3/MM3 (ref 0.7–3.1)
LYMPHOCYTES NFR BLD AUTO: 31 % (ref 19.6–45.3)
MCH RBC QN AUTO: 29.9 PG (ref 26.6–33)
MCHC RBC AUTO-ENTMCNC: 32.8 G/DL (ref 31.5–35.7)
MCV RBC AUTO: 91.3 FL (ref 79–97)
MONOCYTES # BLD AUTO: 0.41 10*3/MM3 (ref 0.1–0.9)
MONOCYTES NFR BLD AUTO: 6.4 % (ref 5–12)
NEUTROPHILS NFR BLD AUTO: 3.48 10*3/MM3 (ref 1.7–7)
NEUTROPHILS NFR BLD AUTO: 54.4 % (ref 42.7–76)
NRBC BLD AUTO-RTO: 0 /100 WBC (ref 0–0.2)
PLATELET # BLD AUTO: 170 10*3/MM3 (ref 140–450)
PMV BLD AUTO: 10.3 FL (ref 6–12)
POTASSIUM SERPL-SCNC: 4.1 MMOL/L (ref 3.5–5.2)
RBC # BLD AUTO: 4.61 10*6/MM3 (ref 3.77–5.28)
SODIUM SERPL-SCNC: 144 MMOL/L (ref 136–145)
WBC NRBC COR # BLD: 6.39 10*3/MM3 (ref 3.4–10.8)

## 2022-08-04 PROCEDURE — 25010000002 ONDANSETRON PER 1 MG: Performed by: NURSE PRACTITIONER

## 2022-08-04 PROCEDURE — 96376 TX/PRO/DX INJ SAME DRUG ADON: CPT

## 2022-08-04 PROCEDURE — G0378 HOSPITAL OBSERVATION PER HR: HCPCS

## 2022-08-04 PROCEDURE — 80048 BASIC METABOLIC PNL TOTAL CA: CPT | Performed by: INTERNAL MEDICINE

## 2022-08-04 PROCEDURE — 99214 OFFICE O/P EST MOD 30 MIN: CPT | Performed by: NURSE PRACTITIONER

## 2022-08-04 PROCEDURE — 85025 COMPLETE CBC W/AUTO DIFF WBC: CPT | Performed by: INTERNAL MEDICINE

## 2022-08-04 RX ORDER — ASPIRIN 81 MG/1
81 TABLET, CHEWABLE ORAL DAILY
Qty: 90 TABLET | Refills: 0 | Status: SHIPPED | OUTPATIENT
Start: 2022-08-05

## 2022-08-04 RX ORDER — VALSARTAN 160 MG/1
160 TABLET ORAL
Qty: 30 TABLET | Refills: 0 | Status: SHIPPED | OUTPATIENT
Start: 2022-08-04 | End: 2022-08-19 | Stop reason: SDUPTHER

## 2022-08-04 RX ORDER — ACETAMINOPHEN 325 MG/1
650 TABLET ORAL EVERY 6 HOURS PRN
Status: DISCONTINUED | OUTPATIENT
Start: 2022-08-04 | End: 2022-08-04 | Stop reason: HOSPADM

## 2022-08-04 RX ORDER — CARVEDILOL 12.5 MG/1
12.5 TABLET ORAL 2 TIMES DAILY
Qty: 60 TABLET | Refills: 0 | Status: SHIPPED | OUTPATIENT
Start: 2022-08-04 | End: 2022-11-14 | Stop reason: SDUPTHER

## 2022-08-04 RX ORDER — ATORVASTATIN CALCIUM 40 MG/1
40 TABLET, FILM COATED ORAL NIGHTLY
Qty: 30 TABLET | Refills: 0 | Status: SHIPPED | OUTPATIENT
Start: 2022-08-04

## 2022-08-04 RX ADMIN — Medication 10 ML: at 08:09

## 2022-08-04 RX ADMIN — HYDRALAZINE HYDROCHLORIDE 25 MG: 25 TABLET, FILM COATED ORAL at 08:08

## 2022-08-04 RX ADMIN — Medication 10 ML: at 08:13

## 2022-08-04 RX ADMIN — SODIUM CHLORIDE 125 ML/HR: 9 INJECTION, SOLUTION INTRAVENOUS at 07:41

## 2022-08-04 RX ADMIN — CARVEDILOL 12.5 MG: 12.5 TABLET, FILM COATED ORAL at 08:08

## 2022-08-04 RX ADMIN — ACETAMINOPHEN 650 MG: 325 TABLET ORAL at 08:08

## 2022-08-04 RX ADMIN — ONDANSETRON 4 MG: 2 INJECTION INTRAMUSCULAR; INTRAVENOUS at 07:41

## 2022-08-04 RX ADMIN — SODIUM CHLORIDE 125 ML/HR: 9 INJECTION, SOLUTION INTRAVENOUS at 00:07

## 2022-08-04 RX ADMIN — ASPIRIN 81 MG: 81 TABLET, CHEWABLE ORAL at 08:08

## 2022-08-04 NOTE — NURSING NOTE
AC follow up:    RN stated pt has done well overnight after speaking with AC clinician yesterday. Pt was not tearful overnight. Pt was given outpatient resources during initial consult. AC will sign off. Please re-consult if needed.

## 2022-08-04 NOTE — PROGRESS NOTES
"DOS: 2022  NAME: Emy Medina   : 1965  PCP: Clem Bush MD  Chief Complaint   Patient presents with   • Arm Problem   • Weakness - Generalized     Stroke        Subjective: No acute events overnight; patient reports she is back to her neurologic baseline.  She denies any other complaints and or concerns on my exam.    No family at bedside    Objective:  Vital signs: /93   Pulse 53   Temp 97.9 °F (36.6 °C) (Oral)   Resp 18   Ht 167.6 cm (66\")   Wt 72.6 kg (160 lb)   SpO2 98%   BMI 25.82 kg/m²       HEENT: Normocephalic, atraumatic   COR: RRR  Resp: Even and unlabored  Extremities: Equal pulses, nondistal embolization    Neurological:   MS: AO. Language normal. No neglect. Higher integrative function normal  CN: II-XII normal  Motor: 5/5, normal tone  Reflexes: Toes downgoing  Sensory: Intact-to light touch  Coordination: Normal-finger-to-nose    Laboratory results:  Lab Results   Component Value Date    GLUCOSE 92 2022    CALCIUM 8.9 2022     2022    K 4.1 2022    CO2 20.5 (L) 2022     (H) 2022    BUN 16 2022    CREATININE 0.86 2022    EGFRIFNONA 83 2022    BCR 18.6 2022    ANIONGAP 13.5 2022     Lab Results   Component Value Date    WBC 6.39 2022    HGB 13.8 2022    HCT 42.1 2022    MCV 91.3 2022     2022     Lab Results   Component Value Date    CHOL 127 2022     Lab Results   Component Value Date    HDL 27 (L) 2022    HDL 33 (L) 2020    HDL 36 (L) 2019     Lab Results   Component Value Date    LDL 77 2022     2020    LDL 87 2019     Lab Results   Component Value Date    TRIG 128 2022    TRIG 138 2020    TRIG 137 2019         Lab 22  0531   HEMOGLOBIN A1C 5.50      Review and interpretation of imaging:  Interpretation Summary    · Calculated left ventricular EF = 62.1% Estimated left " ventricular EF was in agreement with the calculated left ventricular EF. Left ventricular systolic function is normal. Normal left ventricular cavity size noted. Left ventricular wall thickness is consistent with moderate basal asymmetric hypertrophy. All left ventricular wall segments contract normally. Left ventricular diastolic function is consistent with (grade I) impaired relaxation.  · The left atrial cavity is mildly dilated. Saline test results are negative.    MR SCAN OF THE BRAIN WITHOUT CONTRAST     HISTORY: Right face and arm and leg weakness.     TECHNIQUE: The MR scan was performed with sagittal and axial images  without contrast.      FINDINGS: The ventricles are normal in size and midline. There is mild  chronic small vessel ischemic change scattered in the white matter.  There is no evidence of acute intracranial hemorrhage. The diffusion  sequence demonstrates a small area of acute to subacute infarct in the  left maribel measuring 7 x 9 mm. The remainder of the diffusion sequence is  unremarkable.     CONCLUSION: Mild chronic small vessel ischemic change. Acute to subacute  infarct in the left maribel measuring 7 x 9 mm.     MR ANGIOGRAPHY OF THE HEAD AND NECK WITHOUT AND WITH INTRAVENOUS  CONTRAST     FINDINGS: MR angiography of the head and neck was performed and  demonstrates the followin. Evaluation for stenosis is based on NASCET criteria. The vertebral  arteries are patent and symmetric in size. Both supply the basilar  artery.  2. The cervical carotid arteries show no NASCET significant stenosis or  irregularity.  3. The intracranial MR angiography shows no major branch stenosis. There  is no evidence of aneurysm.     This report was finalized on 8/3/2022 11:23 PM by Dr. Rikki Alba M.D.     CT HEAD WITHOUT CONTRAST     HISTORY: Right arm and leg weakness.     COMPARISON: None.     FINDINGS: The brain and ventricles are symmetrical. There is no evidence  of hemorrhage, hydrocephalus or  of abnormal extra-axial fluid. No focal  area of decreased attenuation to suggest acute infarction is identified.  A small lacunar infarct is appreciated involving the external capsule on  the right posteriorly and superiorly. No focal area of decreased  attenuation to suggest acute infarction is identified. Further  evaluation could be performed with an MRI examination of the brain as  indicated. A septum cavum pellucidum is incidentally noted.     IMPRESSION:  No evidence of acute infarction or of intracranial  hemorrhage. The etiology for the patient's right arm and leg weakness is  not established. Further evaluation could be performed with an MRI  examination of the brain.           Radiation dose reduction techniques were utilized, including automated  exposure control and exposure modulation based on body size.     This report was finalized on 8/2/2022 5:33 PM by Dr. Alfonso Rojas M.D.     XR CHEST 1 VW-  08/02/2022     HISTORY: Possible stroke.     Heart size is within normal limits. Lungs appear free of acute  infiltrates. Bones and soft tissues are unremarkable.     IMPRESSION:  1. No acute process.     This report was finalized on 8/2/2022 11:32 AM by Dr. Barrera Reddy M.D.       Impression: This is a 57-year-old female with history of tension, remote PE previously on anticoagulation since discontinued who presented to [facility] 8/2 due to report of several days of worsening right-sided weakness for which our service was consulted.  Ischial CT of the head negative for acute findings.  MRI of the brain later revealed acute to subacute infarct in the left maribel along with mild chronic small vessel ischemic changes.  MRA of the head and neck showed no evidence of high-grade stenosis/aneurysm and/or dissection.  TTE showed EF of 62.1%.  LV normal.  LA mildly dilated.  Saline test negative.  Left atrial index 27.4.  Etiology of event felt to be secondary to uncontrolled risk factors  specifically hypertension.     Neurologically, stable at baseline.  Recommendations below.PT/OT/ST. CCP to assist with discharge planning. Call RRT for any acute neurological changes and/or concerns. We will continue to follow and advise.       Plan:  Aspirin 81 mg daily   Lipitor 80mg daily   Neurochecks  BP control  Stroke Education  FERNANDO/SCDs  PT/OT/ST  Follow-up with neurology in 3-month    Case reviewed with attending neurologist  and he agrees with treatment plan above.      ELA Tong

## 2022-08-04 NOTE — PLAN OF CARE
Goal Outcome Evaluation:  Plan of Care Reviewed With: patient        Progress: improving   VSS on RA. Nightly Coreg held d/t bradycardia (HR 40s). NIH 0. Patient c/o headache after MRI, but declined pain medication. IV fluids. Patient seems better after speaking with access, pt has not been tearful all shift. Await MRI results.

## 2022-08-04 NOTE — CASE MANAGEMENT/SOCIAL WORK
Case Management Discharge Note      Final Note: DC home    Provided Post Acute Provider List?: N/A  Provided Post Acute Provider Quality & Resource List?: N/A    Selected Continued Care - Discharged on 8/4/2022 Admission date: 8/2/2022 - Discharge disposition: Home or Self Care    Destination    No services have been selected for the patient.              Durable Medical Equipment    No services have been selected for the patient.              Dialysis/Infusion    No services have been selected for the patient.              Home Medical Care    No services have been selected for the patient.              Therapy    No services have been selected for the patient.              Community Resources    No services have been selected for the patient.              Community & DME    No services have been selected for the patient.                       Final Discharge Disposition Code: 01 - home or self-care

## 2022-08-04 NOTE — DISCHARGE SUMMARY
NAME: Emy Medina ADMIT: 2022   : 1965  PCP: Clem Bush MD    MRN: [id number] LOS: 0 days   AGE/SEX: 57 y.o. female  ROOM: 03/     Date of Admission:  2022  Date of Discharge:  2022    PCP: Clem Bush MD    CHIEF COMPLAINT  Arm Problem and Weakness - Generalized      DISCHARGE DIAGNOSIS  Active Hospital Problems    Diagnosis  POA   • **Cerebrovascular accident (CVA) (HCC) [I63.9]  Yes   • HTN (hypertension) [I10]  Unknown   • Bradycardia [R00.1]  Unknown   • FUENTES (acute kidney injury) (HCC) [N17.9]  Unknown   • FUENTES (acute kidney injury) (HCC) [N17.9]  Unknown      Resolved Hospital Problems   No resolved problems to display.       SECONDARY DIAGNOSES  Past Medical History:   Diagnosis Date   • Arthritis    • Hypertension    • PE (pulmonary embolism)    • Pleurisy        CONSULTS   Neurology    HOSPITAL COURSE  Patient is a 57 y.o. female with history of remote PE but not currently on anticoagulation, hypertension who presents with right-sided weakness.  She was found to have an acute stroke.  She was not previously taking aspirin and so she should take daily aspirin as well as atorvastatin.  She has history of hypertension on multiple agents, but we actually had to decrease her blood pressure medicine for relatively low blood pressure.  Had made the changes said as listed in the discharge med rec but she should follow-up closely with primary care physician on continue medication adjustments.    DIAGNOSTICS    2D ECHO 22  · Calculated left ventricular EF = 62.1% Estimated left ventricular EF was in agreement with the calculated left ventricular EF. Left ventricular systolic function is normal. Normal left ventricular cavity size noted. Left ventricular wall thickness is consistent with moderate basal asymmetric hypertrophy. All left ventricular wall segments contract normally. Left ventricular diastolic function is consistent with (grade I) impaired  relaxation.  · The left atrial cavity is mildly dilated. Saline test results are negative.       MRI Brain Without Contrast [[id number]] Zach as Reviewed   Order Status: Completed Collected: 22    Updated: 22   Narrative:     MR SCAN OF THE BRAIN WITHOUT CONTRAST       HISTORY: Right face and arm and leg weakness.       TECHNIQUE: The MR scan was performed with sagittal and axial images   without contrast.       FINDINGS: The ventricles are normal in size and midline. There is mild   chronic small vessel ischemic change scattered in the white matter.   There is no evidence of acute intracranial hemorrhage. The diffusion   sequence demonstrates a small area of acute to subacute infarct in the   left maribel measuring 7 x 9 mm. The remainder of the diffusion sequence is   unremarkable.       CONCLUSION: Mild chronic small vessel ischemic change. Acute to subacute   infarct in the left maribel measuring 7 x 9 mm.       MR ANGIOGRAPHY OF THE HEAD AND NECK WITHOUT AND WITH INTRAVENOUS   CONTRAST       FINDINGS: MR angiography of the head and neck was performed and   demonstrates the followin. Evaluation for stenosis is based on NASCET criteria. The vertebral   arteries are patent and symmetric in size. Both supply the basilar   artery.   2. The cervical carotid arteries show no NASCET significant stenosis or   irregularity.   3. The intracranial MR angiography shows no major branch stenosis. There   is no evidence of aneurysm.       This report was finalized on 8/3/2022 11:23 PM by Dr. Rikki Alba M.D.        MRI Angiogram Head Without Contrast [[id number]] Zach as Reviewed   Order Status: Completed Collected: 22    Updated: 22   Narrative:     MR SCAN OF THE BRAIN WITHOUT CONTRAST       HISTORY: Right face and arm and leg weakness.       TECHNIQUE: The MR scan was performed with sagittal and axial images   without contrast.       FINDINGS: The ventricles are normal in size  and midline. There is mild   chronic small vessel ischemic change scattered in the white matter.   There is no evidence of acute intracranial hemorrhage. The diffusion   sequence demonstrates a small area of acute to subacute infarct in the   left maribel measuring 7 x 9 mm. The remainder of the diffusion sequence is   unremarkable.       CONCLUSION: Mild chronic small vessel ischemic change. Acute to subacute   infarct in the left maribel measuring 7 x 9 mm.       MR ANGIOGRAPHY OF THE HEAD AND NECK WITHOUT AND WITH INTRAVENOUS   CONTRAST       FINDINGS: MR angiography of the head and neck was performed and   demonstrates the followin. Evaluation for stenosis is based on NASCET criteria. The vertebral   arteries are patent and symmetric in size. Both supply the basilar   artery.   2. The cervical carotid arteries show no NASCET significant stenosis or   irregularity.   3. The intracranial MR angiography shows no major branch stenosis. There   is no evidence of aneurysm.       This report was finalized on 8/3/2022 11:23 PM by Dr. Rikki Alba M.D.        MRI Angiogram Neck With & Without Contrast [[id number]] Zach as Reviewed   Order Status: Completed Collected: 22 230    Updated: 22   Narrative:     MR SCAN OF THE BRAIN WITHOUT CONTRAST       HISTORY: Right face and arm and leg weakness.       TECHNIQUE: The MR scan was performed with sagittal and axial images   without contrast.       FINDINGS: The ventricles are normal in size and midline. There is mild   chronic small vessel ischemic change scattered in the white matter.   There is no evidence of acute intracranial hemorrhage. The diffusion   sequence demonstrates a small area of acute to subacute infarct in the   left maribel measuring 7 x 9 mm. The remainder of the diffusion sequence is   unremarkable.       CONCLUSION: Mild chronic small vessel ischemic change. Acute to subacute   infarct in the left maribel measuring 7 x 9 mm.       MR  ANGIOGRAPHY OF THE HEAD AND NECK WITHOUT AND WITH INTRAVENOUS   CONTRAST       FINDINGS: MR angiography of the head and neck was performed and   demonstrates the followin. Evaluation for stenosis is based on NASCET criteria. The vertebral   arteries are patent and symmetric in size. Both supply the basilar   artery.   2. The cervical carotid arteries show no NASCET significant stenosis or   irregularity.   3. The intracranial MR angiography shows no major branch stenosis. There   is no evidence of aneurysm.       This report was finalized on 8/3/2022 11:23 PM by Dr. Rikki Alba M.D.        CT Head Without Contrast [[id number]] Zach as Reviewed   Order Status: Completed Collected: 22 1200    Updated: 22 173   Narrative:     CT HEAD WITHOUT CONTRAST       HISTORY: Right arm and leg weakness.       COMPARISON: None.       FINDINGS: The brain and ventricles are symmetrical. There is no evidence   of hemorrhage, hydrocephalus or of abnormal extra-axial fluid. No focal   area of decreased attenuation to suggest acute infarction is identified.   A small lacunar infarct is appreciated involving the external capsule on   the right posteriorly and superiorly. No focal area of decreased   attenuation to suggest acute infarction is identified. Further   evaluation could be performed with an MRI examination of the brain as   indicated. A septum cavum pellucidum is incidentally noted.       Impression:     No evidence of acute infarction or of intracranial   hemorrhage. The etiology for the patient's right arm and leg weakness is   not established. Further evaluation could be performed with an MRI   examination of the brain.               Radiation dose reduction techniques were utilized, including automated   exposure control and exposure modulation based on body size.       This report was finalized on 2022 5:33 PM by Dr. Alfonso Rojas M.D.        XR Chest 1 View [[id number]] Zach as Reviewed   Order  Status: Completed Collected: 08/02/22 1131    Updated: 08/02/22 1136   Narrative:     XR CHEST 1 VW-  08/02/2022       HISTORY: Possible stroke.       Heart size is within normal limits. Lungs appear free of acute   infiltrates. Bones and soft tissues are unremarkable.       Impression:     1. No acute process.          Collected Updated Procedure Result Status    08/04/2022 0703 08/04/2022 0738 Basic Metabolic Panel [[id number]]    (Abnormal)   Blood    Final result Component Value Units   Glucose 92 mg/dL   BUN 16 mg/dL   Creatinine 0.86 mg/dL   Sodium 144 mmol/L   Potassium 4.1 mmol/L   Chloride 110 High  mmol/L   CO2 20.5 Low  mmol/L   Calcium 8.9 mg/dL   BUN/Creatinine Ratio 18.6    Anion Gap 13.5 mmol/L   eGFR 77.9  mL/min/1.73          08/04/2022 0703 08/04/2022 0722 CBC Auto Differential [[id number]]   (Abnormal)   Blood    Final result Component Value Units   WBC 6.39 10*3/mm3   RBC 4.61 10*6/mm3   Hemoglobin 13.8 g/dL   Hematocrit 42.1 %   MCV 91.3 fL   MCH 29.9 pg   MCHC 32.8 g/dL   RDW 12.8 %   RDW-SD 42.0 fl   MPV 10.3 fL   Platelets 170 10*3/mm3   Neutrophil % 54.4 %   Lymphocyte % 31.0 %   Monocyte % 6.4 %   Eosinophil % 6.6 High  %   Basophil % 1.3 %   Immature Grans % 0.3 %   Neutrophils, Absolute 3.48 10*3/mm3   Lymphocytes, Absolute 1.98 10*3/mm3   Monocytes, Absolute 0.41 10*3/mm3   Eosinophils, Absolute 0.42 High  10*3/mm3   Basophils, Absolute 0.08 10*3/mm3   Immature Grans, Absolute 0.02 10*3/mm3   nRBC 0.0 /100 WBC          08/03/2022 1515 08/03/2022 1638 D-dimer, Quantitative [[id number]]    Blood    Final result Component Value Units   D-Dimer, Quantitative 0.46 MCGFEU/mL          08/03/2022 0531 08/03/2022 0615 Hemoglobin A1c [[id number]]    Blood    Final result Component Value Units   Hemoglobin A1C 5.50 %          08/03/2022 0531 08/03/2022 0627 Lipid Panel [[id number]]    (Abnormal)   Blood    Final result Component Value Units   Total Cholesterol 127 mg/dL   Triglycerides 128 mg/dL   HDL  Cholesterol 27 Low  mg/dL   LDL Cholesterol  77 mg/dL   VLDL Cholesterol 23 mg/dL   LDL/HDL Ratio 2.76               PHYSICAL EXAM  Objective    Alert  nad  No resp distress  CN grossly intact  Fairly normal strength    CONDITION ON DISCHARGE  Stable.      DISCHARGE DISPOSITION   Home or Self Care      DISCHARGE MEDICATIONS       Your medication list      START taking these medications      Instructions Last Dose Given Next Dose Due   aspirin 81 MG chewable tablet  Start taking on: August 5, 2022      Chew 1 tablet Daily.       atorvastatin 40 MG tablet  Commonly known as: LIPITOR      Take 1 tablet by mouth Every Night.          CHANGE how you take these medications      Instructions Last Dose Given Next Dose Due   carvedilol 12.5 MG tablet  Commonly known as: COREG  What changed:   · medication strength  · how much to take      Take 1 tablet by mouth 2 (Two) Times a Day.       valsartan 160 MG tablet  Commonly known as: DIOVAN  What changed:   · medication strength  · how much to take  · how to take this  · when to take this      Take 1 tablet by mouth Daily.          CONTINUE taking these medications      Instructions Last Dose Given Next Dose Due   CALCIUM 1200 PO      Take  by mouth.       hydrALAZINE 25 MG tablet  Commonly known as: APRESOLINE      Take 1 tablet by mouth 3 (Three) Times a Day.       Vitamin D3 10 MCG (400 UNIT) capsule      Daily.          STOP taking these medications    acetaminophen 325 MG tablet  Commonly known as: TYLENOL        meloxicam 15 MG tablet  Commonly known as: MOBIC        spironolactone 25 MG tablet  Commonly known as: ALDACTONE              Where to Get Your Medications      These medications were sent to [facility] [place] [id number]    Hours: 7:00 AM-6:00 PM Mon-Fri, 8:00 AM-4:30 PM Sat-Sun (Closed 12-12:30PM) Phone: [phone]   · aspirin 81 MG chewable tablet  · atorvastatin 40 MG tablet  · carvedilol 12.5 MG tablet  · valsartan 160  MG tablet          Future Appointments   Date Time Provider Department Center   10/7/2022  3:15 PM Leighann Echols MD [facility]     Additional Instructions for the Follow-ups that You Need to Schedule     Discharge Follow-up with Specialty: PCP in 1-2 weeks, Neurology in 2-3 months   As directed      Specialty: PCP in 1-2 weeks, Neurology in 2-3 months            Follow-up Information     Clem Bush MD .    Specialty: Family Medicine  Contact information:  [address]  [facility] [address]  [phone]                         TEST  RESULTS PENDING AT DISCHARGE         Flavio Ramírez MD  [facility]  08/04/22  12:22 EDT      Time: greater than 32 minutes on discharge  It was a pleasure taking care of this patient while in the hospital.

## 2022-08-05 NOTE — OUTREACH NOTE
Prep Survey    Flowsheet Row Responses   [facility] facility patient discharged from? [place]   Is LACE score < 7 ? Yes   Emergency Room discharge w/ pulse ox? No   Eligibility Readm Mgmt   Discharge diagnosis CVA   Does the patient have one of the following disease processes/diagnoses(primary or secondary)? Stroke (TIA)   Does the patient have Home health ordered? No   Is there a DME ordered? No   Prep survey completed? Yes          TERRY DHILLON - Registered Nurse

## 2022-08-08 ENCOUNTER — HOSPITAL ENCOUNTER (EMERGENCY)
Facility: HOSPITAL | Age: 57
Discharge: HOME | End: 2022-08-08
Attending: EMERGENCY MEDICINE | Admitting: EMERGENCY MEDICINE

## 2022-08-08 ENCOUNTER — APPOINTMENT (OUTPATIENT)
Dept: CT IMAGING | Facility: HOSPITAL | Age: 57
End: 2022-08-08

## 2022-08-08 ENCOUNTER — TELEPHONE (OUTPATIENT)
Dept: NEUROLOGY | Facility: CLINIC | Age: 57
End: 2022-08-08

## 2022-08-08 VITALS
HEART RATE: 45 BPM | OXYGEN SATURATION: 97 % | WEIGHT: 155 LBS | DIASTOLIC BLOOD PRESSURE: 94 MMHG | BODY MASS INDEX: 24.91 KG/M2 | HEIGHT: 66 IN | RESPIRATION RATE: 18 BRPM | SYSTOLIC BLOOD PRESSURE: 182 MMHG | TEMPERATURE: 98.1 F

## 2022-08-08 DIAGNOSIS — I10 HYPERTENSION NOT AT GOAL: Primary | ICD-10-CM

## 2022-08-08 DIAGNOSIS — Z86.73 HISTORY OF RECENT STROKE: ICD-10-CM

## 2022-08-08 LAB
ALBUMIN SERPL-MCNC: 4.6 G/DL (ref 3.5–5.2)
ALBUMIN/GLOB SERPL: 2.1 G/DL
ALP SERPL-CCNC: 72 U/L (ref 39–117)
ALT SERPL W P-5'-P-CCNC: 29 U/L (ref 1–33)
ANION GAP SERPL CALCULATED.3IONS-SCNC: 12 MMOL/L (ref 5–15)
AST SERPL-CCNC: 24 U/L (ref 1–32)
BASOPHILS # BLD AUTO: 0.07 10*3/MM3 (ref 0–0.2)
BASOPHILS NFR BLD AUTO: 0.9 % (ref 0–1.5)
BILIRUB SERPL-MCNC: 0.5 MG/DL (ref 0–1.2)
BUN SERPL-MCNC: 18 MG/DL (ref 6–20)
BUN/CREAT SERPL: 19.6 (ref 7–25)
CALCIUM SPEC-SCNC: 9.7 MG/DL (ref 8.6–10.5)
CHLORIDE SERPL-SCNC: 107 MMOL/L (ref 98–107)
CO2 SERPL-SCNC: 27 MMOL/L (ref 22–29)
CREAT SERPL-MCNC: 0.92 MG/DL (ref 0.57–1)
DEPRECATED RDW RBC AUTO: 40.6 FL (ref 37–54)
EGFRCR SERPLBLD CKD-EPI 2021: 71.9 ML/MIN/1.73
EOSINOPHIL # BLD AUTO: 0.41 10*3/MM3 (ref 0–0.4)
EOSINOPHIL NFR BLD AUTO: 5.2 % (ref 0.3–6.2)
ERYTHROCYTE [DISTWIDTH] IN BLOOD BY AUTOMATED COUNT: 12.6 % (ref 12.3–15.4)
GLOBULIN UR ELPH-MCNC: 2.2 GM/DL
GLUCOSE SERPL-MCNC: 86 MG/DL (ref 65–99)
HCT VFR BLD AUTO: 43.2 % (ref 34–46.6)
HGB BLD-MCNC: 14.8 G/DL (ref 12–15.9)
IMM GRANULOCYTES # BLD AUTO: 0.03 10*3/MM3 (ref 0–0.05)
IMM GRANULOCYTES NFR BLD AUTO: 0.4 % (ref 0–0.5)
LYMPHOCYTES # BLD AUTO: 2.16 10*3/MM3 (ref 0.7–3.1)
LYMPHOCYTES NFR BLD AUTO: 27.6 % (ref 19.6–45.3)
MCH RBC QN AUTO: 30.6 PG (ref 26.6–33)
MCHC RBC AUTO-ENTMCNC: 34.3 G/DL (ref 31.5–35.7)
MCV RBC AUTO: 89.4 FL (ref 79–97)
MONOCYTES # BLD AUTO: 0.57 10*3/MM3 (ref 0.1–0.9)
MONOCYTES NFR BLD AUTO: 7.3 % (ref 5–12)
NEUTROPHILS NFR BLD AUTO: 4.58 10*3/MM3 (ref 1.7–7)
NEUTROPHILS NFR BLD AUTO: 58.6 % (ref 42.7–76)
NRBC BLD AUTO-RTO: 0 /100 WBC (ref 0–0.2)
PLATELET # BLD AUTO: 203 10*3/MM3 (ref 140–450)
PMV BLD AUTO: 10.7 FL (ref 6–12)
POTASSIUM SERPL-SCNC: 4.4 MMOL/L (ref 3.5–5.2)
PROT SERPL-MCNC: 6.8 G/DL (ref 6–8.5)
RBC # BLD AUTO: 4.83 10*6/MM3 (ref 3.77–5.28)
SODIUM SERPL-SCNC: 146 MMOL/L (ref 136–145)
TROPONIN T SERPL-MCNC: <0.01 NG/ML (ref 0–0.03)
WBC NRBC COR # BLD: 7.82 10*3/MM3 (ref 3.4–10.8)

## 2022-08-08 PROCEDURE — 80053 COMPREHEN METABOLIC PANEL: CPT | Performed by: EMERGENCY MEDICINE

## 2022-08-08 PROCEDURE — 70450 CT HEAD/BRAIN W/O DYE: CPT

## 2022-08-08 PROCEDURE — 84484 ASSAY OF TROPONIN QUANT: CPT | Performed by: EMERGENCY MEDICINE

## 2022-08-08 PROCEDURE — 85025 COMPLETE CBC W/AUTO DIFF WBC: CPT | Performed by: EMERGENCY MEDICINE

## 2022-08-08 PROCEDURE — 36415 COLL VENOUS BLD VENIPUNCTURE: CPT | Performed by: EMERGENCY MEDICINE

## 2022-08-08 PROCEDURE — 93010 ELECTROCARDIOGRAM REPORT: CPT | Performed by: INTERNAL MEDICINE

## 2022-08-08 PROCEDURE — 93005 ELECTROCARDIOGRAM TRACING: CPT | Performed by: EMERGENCY MEDICINE

## 2022-08-08 PROCEDURE — 99282 EMERGENCY DEPT VISIT SF MDM: CPT

## 2022-08-08 NOTE — TELEPHONE ENCOUNTER
Provider: ELA ENRIQUEZ  Caller: JACK WHITE  Relationship to Patient: SELF  Pharmacy: [facility]    Reason for Call: PT CALLING STATING THAT SHE HAS NO VISION PROBLEM, NOT DRAGGING RT LEG, NO SWELLING IN ANKLES.  SHE HAS A COUPLE CONCERNS   1- WHEN WAKE UP IN THE MORNING SHE HAS IN RT ARM HAS A DULL PAIN, THEN BETWEEN 10:30AM-11AM THE PAIN GOES AWAY.    2- BLOOD PRESSURE - RAHEEM TOLD HER TO KEEP THE TOP# BETWEEN 120-140 IS GOOD. TODAY HER B/P WAS AT 9AM 180/105(9:05AM TOOK carvedilol & hydrALAZINE THEN 10:15AM /91, THEN 3:PM /105(3:10PM TOOK hydrALAZINE ) THEN AT 9PM SHE WILL TAKE hydrALAZINE &carvedilol & ASPIRIN & VALSARTAN, LIPITOR).    PLEASE CALL [phone]

## 2022-08-08 NOTE — ED PROVIDER NOTES
EMERGENCY DEPARTMENT ENCOUNTER    Room Number:  30/30  Date of encounter:  8/8/2022  PCP: Clem Bush MD  Historian: Patient      HPI:  Chief Complaint: Upper extremity problem  A complete HPI/ROS/PMH/PSH/SH/FH are unobtainable due to: N/A    Context: Emy Medina is a 57 y.o. female who presents to the ED c/o right upper extremity pain and heavy sensation for the past few mornings.  She states that she wakes up with this sensation and it usually will clear up after 2 to 3 hours.  She does not describe weakness, she denies numbness or tingling.  It is more of an ache, mostly in the right biceps and it does not radiate.  There are no aggravating relieving factors.  No chest pain or shortness of breath.  She has noticed that her blood pressure has been slightly elevated in the mornings over the past few days.  After she takes her medications, her blood pressure improves.  She has not been taking hydralazine every 8 hours-she will take a dose early in the morning another dose later in the afternoon and her third dose early in the evening-so there is approximately 12 hours between her p.m. and a.m. dose.  Currently she is asymptomatic.      Summary of prior records:  Patient was admitted to this facility from 8/2/2022 through 8/4/2022 for a cerebrovascular accident.  She has a history of hypertension, bradycardia and acute kidney injury as well as prior pulmonary embolism.  Her stroke symptoms were right arm and right leg weakness/heavy feeling.    The patient was placed in a mask in triage, hand hygiene was performed before and after my interaction with the patient.  I wore a mask, safety glasses and gloves during my entire interaction with the patient.    PAST MEDICAL HISTORY  Active Ambulatory Problems     Diagnosis Date Noted   • Chest pain 02/17/2022   • Cerebrovascular accident (CVA) ([facility]) 08/02/2022   • HTN (hypertension) 08/03/2022   • Bradycardia 08/03/2022   • FUENTES (acute kidney injury) ([facility])  08/03/2022   • FUENTES (acute kidney injury) ([facility]) 08/03/2022     Resolved Ambulatory Problems     Diagnosis Date Noted   • No Resolved Ambulatory Problems     Past Medical History:   Diagnosis Date   • Arthritis    • Hypertension    • PE (pulmonary embolism)    • Pleurisy          PAST SURGICAL HISTORY  Past Surgical History:   Procedure Laterality Date   • CATARACT EXTRACTION     • HYSTERECTOMY     • LASIK     • SINUS SURGERY           FAMILY HISTORY  History reviewed. No pertinent family history.      SOCIAL HISTORY  Social History     Socioeconomic History   • Marital status:    Tobacco Use   • Smoking status: Never Smoker   • Smokeless tobacco: Never Used   Vaping Use   • Vaping Use: Never used   Substance and Sexual Activity   • Alcohol use: No   • Drug use: No   • Sexual activity: Yes     Partners: Male         ALLERGIES  Doxycycline and Sulfa antibiotics        REVIEW OF SYSTEMS  Review of Systems   Constitutional: Negative for chills and fever.   Respiratory: Negative for chest tightness.    Cardiovascular: Negative for chest pain.   Gastrointestinal: Negative for abdominal pain, nausea and vomiting.   Musculoskeletal: Negative for joint swelling.   Neurological: Negative for tremors, syncope, speech difficulty, weakness, numbness and headaches.        All systems reviewed and negative except for those discussed in HPI.       PHYSICAL EXAM    I have reviewed the triage vital signs and nursing notes.    ED Triage Vitals [08/08/22 1750]   Temp Heart Rate Resp BP SpO2   98.6 °F (37 °C) 61 18 (!) 183/102 97 %      Temp src Heart Rate Source Patient Position BP Location FiO2 (%)   Tympanic Monitor Standing Right arm --       Physical Exam   Constitutional: Pt. is oriented to person, place, and time and well-developed, well-nourished, and in no distress.   HENT: Normocephalic and atraumatic.   Neck: Normal range of motion. Neck supple. No JVD present.   Cardiovascular: Normal rate, regular rhythm and normal  heart sounds. No murmur heard.  Pulmonary/Chest: Effort normal and breath sounds normal. No stridor. No respiratory distress. No wheezes, no rales.   Abdominal: Soft. Bowel sounds are normal. No distension. There is no tenderness. There is no rebound and no guarding.   Musculoskeletal: Right upper extremity is unremarkable-range of motion of the shoulder, elbow, wrist and fingers is normal.  Strength and sensation is intact with easily palpable ulnar and radial pulses.  There is no bicipital head tenderness.  Remaining extremities exhibit normal range of motion and are without edema, tenderness or deformity.   Neurological: Pt. is alert and oriented to person, place, and time.  She has no focal neurologic deficits  Skin: Skin is warm and dry. No rash noted. Pt. is not diaphoretic. No erythema.   Psychiatric: Mood, affect and judgment normal.  She is pleasant and cooperative.  Nursing note and vitals reviewed.        LAB RESULTS  Recent Results (from the past 24 hour(s))   Comprehensive Metabolic Panel    Collection Time: 08/08/22  6:46 PM    Specimen: Blood   Result Value Ref Range    Glucose 86 65 - 99 mg/dL    BUN 18 6 - 20 mg/dL    Creatinine 0.92 0.57 - 1.00 mg/dL    Sodium 146 (H) 136 - 145 mmol/L    Potassium 4.4 3.5 - 5.2 mmol/L    Chloride 107 98 - 107 mmol/L    CO2 27.0 22.0 - 29.0 mmol/L    Calcium 9.7 8.6 - 10.5 mg/dL    Total Protein 6.8 6.0 - 8.5 g/dL    Albumin 4.60 3.50 - 5.20 g/dL    ALT (SGPT) 29 1 - 33 U/L    AST (SGOT) 24 1 - 32 U/L    Alkaline Phosphatase 72 39 - 117 U/L    Total Bilirubin 0.5 0.0 - 1.2 mg/dL    Globulin 2.2 gm/dL    A/G Ratio 2.1 g/dL    BUN/Creatinine Ratio 19.6 7.0 - 25.0    Anion Gap 12.0 5.0 - 15.0 mmol/L    eGFR 71.9 >60.0 mL/min/1.73   Troponin    Collection Time: 08/08/22  6:46 PM    Specimen: Blood   Result Value Ref Range    Troponin T <0.010 0.000 - 0.030 ng/mL   CBC Auto Differential    Collection Time: 08/08/22  6:46 PM    Specimen: Blood   Result Value Ref Range     WBC 7.82 3.40 - 10.80 10*3/mm3    RBC 4.83 3.77 - 5.28 10*6/mm3    Hemoglobin 14.8 12.0 - 15.9 g/dL    Hematocrit 43.2 34.0 - 46.6 %    MCV 89.4 79.0 - 97.0 fL    MCH 30.6 26.6 - 33.0 pg    MCHC 34.3 31.5 - 35.7 g/dL    RDW 12.6 12.3 - 15.4 %    RDW-SD 40.6 37.0 - 54.0 fl    MPV 10.7 6.0 - 12.0 fL    Platelets 203 140 - 450 10*3/mm3    Neutrophil % 58.6 42.7 - 76.0 %    Lymphocyte % 27.6 19.6 - 45.3 %    Monocyte % 7.3 5.0 - 12.0 %    Eosinophil % 5.2 0.3 - 6.2 %    Basophil % 0.9 0.0 - 1.5 %    Immature Grans % 0.4 0.0 - 0.5 %    Neutrophils, Absolute 4.58 1.70 - 7.00 10*3/mm3    Lymphocytes, Absolute 2.16 0.70 - 3.10 10*3/mm3    Monocytes, Absolute 0.57 0.10 - 0.90 10*3/mm3    Eosinophils, Absolute 0.41 (H) 0.00 - 0.40 10*3/mm3    Basophils, Absolute 0.07 0.00 - 0.20 10*3/mm3    Immature Grans, Absolute 0.03 0.00 - 0.05 10*3/mm3    nRBC 0.0 0.0 - 0.2 /100 WBC   ECG 12 Lead    Collection Time: 08/08/22  6:54 PM   Result Value Ref Range    QT Interval 427 ms       Ordered the above labs and independently reviewed the results.        RADIOLOGY  CT Head Without Contrast    Result Date: 8/8/2022  CT HEAD WO CONTRAST-  INDICATIONS: Right arm weakness  TECHNIQUE: Radiation dose reduction techniques were utilized, including automated exposure control and exposure modulation based on body size. Noncontrast head CT  COMPARISON: 08/02/2022  FINDINGS:  Small hypodensity is seen in the left maribel, corresponding to the area of infarct demonstrated on the MRI from 08/03/2022.  No acute intracranial hemorrhage, midline shift or mass effect.  Mild periventricular hypodensities suggest chronic small vessel ischemic change in a patient this age.  Cavum septum pellucidum is noted. Ventricles, cisterns, cerebral sulci are otherwise unremarkable for patient age.  The visualized paranasal sinuses, orbits, mastoid air cells are unremarkable.           No acute intracranial hemorrhage or hydrocephalus. Evolving post infarct change in the  left maribel. If there is further clinical concern, follow-up MRI could be considered for further evaluation.  This report was finalized on 8/8/2022 7:26 PM by Dr. Mike Huynh M.D.        I ordered the above noted radiological studies. Reviewed by me and discussed with radiologist.  See dictation for official radiology interpretation.      PROCEDURES    Procedures      MEDICATIONS GIVEN IN ER    Medications - No data to display      PROGRESS, DATA ANALYSIS, CONSULTS, AND MEDICAL DECISION MAKING    Any/all labs have been independently reviewed by me.  Any/all radiology studies have been reviewed by me and discussed with radiologist dictating the report.   EKG's independently viewed and interpreted by me.  Discussion below represents my analysis of pertinent findings related to patient's condition, differential diagnosis, treatment plan and final disposition.    Number of Diagnoses or Management Options     Amount and/or Complexity of Data Reviewed  Clinical lab tests:  Yes  Tests in the radiology section of CPT®:  Yes  Tests in the medicine section of CPT®:  No  Review and summarize past medical records:  (Yes-see HPI)  Independent visualization of images, tracings, or specimens: (Yes-see below)      ED Course as of 08/08/22 2101   Mon Aug 08, 2022   1937 CT of the brain was intimately visualized by me and interpreted by/discussed with Dr. Huynh (radiology)-there is no acute intracranial hemorrhage.  There is evolving postinfarct change in the left maribel.  For official interpretation, see dictated report. [WC]   2050 Case discussed with Dr. Garcia (stroke neurology)-he is comfortable letting her go home as long as her blood pressure is under better control.  Patient and I discussed timing of her hydralazine-she is not taking it every 8 hours-she takes a dose in the morning, a dose in the early afternoon and then it is before going to bed so I suspect she is having some rebound hypertension in the morning.  I  advised her to make sure she takes hydralazine every 8 hours and keep a close eye on her blood pressure. [WC]      ED Course User Index  [WC] Ervin Eason MD       AS OF 21:01 EDT VITALS:    BP - (!) 182/94  HR - (!) 45  TEMP - 98.1 °F (36.7 °C)  02 SATS - 97%        DIAGNOSIS  Final diagnoses:   Hypertension not at goal   History of recent stroke         DISPOSITION  Discharged           Ervin Eason MD  08/08/22 7781

## 2022-08-08 NOTE — ED NOTES
Pt arrived by PV from home, reports hypertension today, weakness on right arm just in the mornings until 10am for the last week, pt reports no weakness at this time.     Patient was placed in face mask during first look triage.  Patient was wearing a face mask throughout encounter.  I wore personal protective equipment throughout the encounter.  Hand hygiene was performed before and after patient encounter.

## 2022-08-08 NOTE — TELEPHONE ENCOUNTER
Patient states she had a mild stroke and was in the hospital . The Patient states she is on Asprin but something does not feel right . Patient has been good yesterday she states but her BP is all over the place today had right arm pain for a wile and then it went away but her BP has been 180/105,146/91,153/105,198/98  .I advised her to go to the ER and get checked because the BP with Medication is up and down .roberto Oz[facility]   Pt is A & O x 4. Lungs sound clear, bowel sounds active, cms intact. Up Independently. Suppose to use post op shoes when OOB, sometimes pt doesn't. On IV Vanco. Denies pain, refused tylenol. Will continue to monitor.

## 2022-08-09 NOTE — DISCHARGE INSTRUCTIONS
Patient to take hydralazine as close to every 8 hours as possible.  Follow-up with your primary care physician as scheduled.  Return to emergency department for any problems.

## 2022-08-10 LAB — QT INTERVAL: 427 MS

## 2022-08-19 ENCOUNTER — OFFICE VISIT (OUTPATIENT)
Dept: CARDIOLOGY | Facility: CLINIC | Age: 57
End: 2022-08-19

## 2022-08-19 VITALS
BODY MASS INDEX: 25.23 KG/M2 | WEIGHT: 157 LBS | SYSTOLIC BLOOD PRESSURE: 118 MMHG | HEIGHT: 66 IN | HEART RATE: 58 BPM | DIASTOLIC BLOOD PRESSURE: 80 MMHG

## 2022-08-19 DIAGNOSIS — I10 PRIMARY HYPERTENSION: Primary | ICD-10-CM

## 2022-08-19 PROCEDURE — 99214 OFFICE O/P EST MOD 30 MIN: CPT | Performed by: INTERNAL MEDICINE

## 2022-08-19 RX ORDER — NITROFURANTOIN 25; 75 MG/1; MG/1
CAPSULE ORAL
COMMUNITY
Start: 2022-08-16 | End: 2022-09-27

## 2022-08-19 RX ORDER — VALSARTAN 160 MG/1
160 TABLET ORAL 2 TIMES DAILY
Qty: 90 TABLET | Refills: 3 | Status: SHIPPED | OUTPATIENT
Start: 2022-08-19 | End: 2023-03-22 | Stop reason: HOSPADM

## 2022-08-19 NOTE — PROGRESS NOTES
Subjective:     Encounter Date: 08/19/22      Patient ID: Emy Medina is a 57 y.o. female.    Chief Complaint: Hypertension  HPI:   This is a 57-year-old woman with hypertension, PE, CVA.  When I met her in April 2022 she had recently been in the emergency with chest pain.  At that time she was severely hypertensive.  We have been working on her blood pressure and had actually pretty well controlled.  Then in August 2022 she noted some unilateral weakness in her arm and leg.  She waited a few days but eventually presented to the emergency room.  At that time she was found to have a lacunar stroke.  Her blood pressure medications were adjusted due to episodes of hypotension.  A few days later she presented back to the emergency room with severe hypertension.  Today she presents for close follow-up.  She has had pressures which have been somewhat controlled, as low as 1 10-1 15 systolic but also as high as 150.  Overall she feels like she is regaining her strength.  She feels very anxious and like she cannot sleep because she is worried that she might not wake up in the morning due to her hypertensive issues.        She works in the cafeteria of a school.  She is .  She has never smoked.  She does not drink alcohol.  Her history of PE was back in 2016 which occurred after hysterectomy.  She was treated with anticoagulation for 6 months.  The following portions of the patient's history were reviewed and updated as appropriate: allergies, current medications, past family history, past medical history, past social history, past surgical history and problem list.     REVIEW OF SYSTEMS:   All systems reviewed.  Pertinent positives identified in HPI.  All other systems are negative.    Past Medical History:   Diagnosis Date   • Arthritis    • Hypertension    • PE (pulmonary embolism)    • Pleurisy        No family history on file.    Social History     Socioeconomic History   • Marital status:     Tobacco Use   • Smoking status: Never Smoker   • Smokeless tobacco: Never Used   Vaping Use   • Vaping Use: Never used   Substance and Sexual Activity   • Alcohol use: No   • Drug use: No   • Sexual activity: Yes     Partners: Male       Allergies   Allergen Reactions   • Doxycycline    • Sulfa Antibiotics Hives       Past Surgical History:   Procedure Laterality Date   • CATARACT EXTRACTION     • HYSTERECTOMY     • LASIK     • SINUS SURGERY         Procedures       Objective:         PHYSICAL EXAM:  GEN: VSS, no distress,   Eyes: normal sclera, normal lids and lashes  HENT: moist mucus membranes,   Respiratory: CTAB, no rales or wheezes  CV: RRR, no murmurs, , +2 DP and 2+ carotid pulses b/l  GI: NABS, soft,  Nontender, nondistended  MSK: no edema, no scoliosis or kyphosis  Skin: no rash, warm, dry  Heme/Lymph: no bruising or bleeding  Psych: organized thought, normal behavior and affect  Neuro: Cranial nerves grossly intact, Alert and Oriented x 3.         Assessment:          Diagnosis Plan   1. Primary hypertension          Plan:       1.  Hypertension: Continue Coreg 12.5 twice daily.  She does not tolerate higher doses due to bradycardia.  Continue hydralazine 25 every 8 hours.  Increase valsartan to 160 mg twice daily.  2.  Chest pain: Resolved  3.  History of PE 2016 postoperative hysterectomy  4.  CVA August 2022: Aspirin, Lipitor 40, blood pressure troll    Dr. Bush, thank you very much for referring this kind patient to me. Please call me with any questions or concerns. I will see the patient again in the office in 3 months.          Leighann Echols MD  08/19/22  [facility]    Outpatient Encounter Medications as of 8/19/2022   Medication Sig Dispense Refill   • aspirin 81 MG chewable tablet Chew 1 tablet Daily. 90 tablet 0   • atorvastatin (LIPITOR) 40 MG tablet Take 1 tablet by mouth Every Night. 30 tablet 0   • Calcium Carbonate-Vit D-Min (CALCIUM 1200 PO) Take  by mouth.     •  carvedilol (COREG) 12.5 MG tablet Take 1 tablet by mouth 2 (Two) Times a Day. 60 tablet 0   • Cholecalciferol (Vitamin D3) 10 MCG (400 UNIT) capsule Daily.     • hydrALAZINE (APRESOLINE) 25 MG tablet Take 1 tablet by mouth 3 (Three) Times a Day. 90 tablet 3   • nitrofurantoin, macrocrystal-monohydrate, (MACROBID) 100 MG capsule TAKE 1 CAPSULE BY MOUTH TWICE A DAY FOR 7 DAYS     • valsartan (DIOVAN) 160 MG tablet Take 1 tablet by mouth 2 (Two) Times a Day. 90 tablet 3   • [DISCONTINUED] valsartan (DIOVAN) 160 MG tablet Take 1 tablet by mouth Daily. 30 tablet 0     No facility-administered encounter medications on file as of 8/19/2022.

## 2022-08-23 ENCOUNTER — READMISSION MANAGEMENT (OUTPATIENT)
Dept: CALL CENTER | Facility: HOSPITAL | Age: 57
End: 2022-08-23

## 2022-08-23 NOTE — OUTREACH NOTE
Stroke Week 3 Survey    Flowsheet Row Responses   [facility] patient discharged from? [facility]   Does the patient have one of the following disease processes/diagnoses(primary or secondary)? Stroke (TIA)   Week 3 attempt successful? Yes   Call start time 1525   Call end time 1537   Discharge diagnosis CVA   Meds reviewed with patient/caregiver? Yes   Does the patient have all medications ordered at discharge? Yes   Is the patient taking all medications as directed (includes completed medication regime)? Yes   Comments regarding appointments Outpatient therapy starts on 8/24/22,  Neuro appt is on 10/4/22   Does the patient have a primary care provider?  Yes   Comments regarding PCP Pt has followed up with PCP   Has the patient kept scheduled appointments due by today? Yes   Psychosocial issues? No   Does the patient require any assistance with activities of daily living such as eating, bathing, dressing, walking, etc.? No   Does the patient have any residual symptoms from stroke/TIA? Yes   Residual symptoms comments writing is a problem but is improving daily   Does the patient understand the diet ordered at discharge? No   Did the patient receive a copy of their discharge instructions? Yes   Nursing interventions Reviewed instructions with patient   What is the patient's perception of their health status since discharge? Improving  [Pt has back pain. Stopping the meloxicam has not helped with pain. BP is still elevated. ]   Is the patient able to teach back FAST for Stroke? No   Is the patient/caregiver able to teach back the risk factors for a stroke? High blood pressure-goal below 120/80, Smoking, Diabetes, High Cholesterol, History of TIAs, Sleep apnea   Is the patient/caregiver able to teach back signs and symptoms related to disease process for when to call PCP? Yes   Is the patient/caregiver able to teach back signs and symptoms related to disease process for when to call 911? Yes   If the patient is a  current smoker, are they able to teach back resources for cessation? Not a smoker   Is the patient/caregiver able to teach back the hierarchy of who to call/visit for symptoms/problems? PCP, Specialist, Home health nurse, Urgent Care, ED, 911 Yes   Week 3 call completed? Yes   Revoked No further contact(revokes)-requires comment   Is the patient interested in additional calls from an ambulatory ?  NOTE:  applies to high risk patients requiring additional follow-up. No   Graduated/Revoked comments Pt reports she's improving          JUAN ROUSSEAU - Registered Nurse

## 2022-08-28 ENCOUNTER — TELEPHONE (OUTPATIENT)
Dept: CARDIOLOGY | Facility: CLINIC | Age: 57
End: 2022-08-28

## 2022-08-29 NOTE — TELEPHONE ENCOUNTER
I received a call that her BP was 180s/110s at 2030; she had forgotten her midday dose of hydralazine. She his dizzy but it is starting to improve. She has a dull headache but no vision changes. She denies chest pain or dyspnea.     She was outside in the pool today and did some water exercises.     I advised her to take 50 mg hydralazine now and then her regular carvedilol and valsartan dose at bedtime. I advised her to rest for the remainder of the evening.     Please call and check on her on Monday.     Thanks!  Theodora Mayer, ELA

## 2022-08-29 NOTE — TELEPHONE ENCOUNTER
Called pt check on her.  She states she is feeling better today.    Per pt, 1 hour after taking the extra 50mg of hydralazine yesterday, her BP came down to 160/95.    Cardiac meds verified:  Valsartan 160mg twice daily   Carvedilol  12.5mg twice daily   Hydralazine 25mg three times daily.    Pt is concerned that her SBP continually runs in the 150's when she checks it at 6pm.  Pt asked if you wanted to adjust her BP meds.    Do you have any further recommendations for this pt?    Thank you,     Rochelle Arrieta RN  [facility] [place]

## 2022-09-01 ENCOUNTER — TELEPHONE (OUTPATIENT)
Dept: NEUROLOGY | Facility: CLINIC | Age: 57
End: 2022-09-01

## 2022-09-01 NOTE — TELEPHONE ENCOUNTER
Patient came into the office and had me check her blood pressure. She was wanting to make sure her machine was close to the reading I got. I made a copy of the pressure she had be keeping at home. I scanned into chart.

## 2022-09-02 NOTE — TELEPHONE ENCOUNTER
Please let her know systolic BP goal is < 130 and Diastolic BP goal is < 90. She needs to follow-up w/PCP in next 1 month.

## 2022-09-12 RX ORDER — HYDRALAZINE HYDROCHLORIDE 25 MG/1
TABLET, FILM COATED ORAL
Qty: 270 TABLET | Refills: 1 | Status: SHIPPED | OUTPATIENT
Start: 2022-09-12 | End: 2022-11-28 | Stop reason: SDUPTHER

## 2022-09-27 ENCOUNTER — OFFICE VISIT (OUTPATIENT)
Dept: CARDIOLOGY | Facility: CLINIC | Age: 57
End: 2022-09-27

## 2022-09-27 VITALS
HEART RATE: 54 BPM | DIASTOLIC BLOOD PRESSURE: 82 MMHG | SYSTOLIC BLOOD PRESSURE: 132 MMHG | WEIGHT: 155 LBS | HEIGHT: 66 IN | BODY MASS INDEX: 24.91 KG/M2

## 2022-09-27 DIAGNOSIS — I10 PRIMARY HYPERTENSION: Primary | ICD-10-CM

## 2022-09-27 PROCEDURE — 99213 OFFICE O/P EST LOW 20 MIN: CPT | Performed by: INTERNAL MEDICINE

## 2022-09-27 NOTE — PROGRESS NOTES
Subjective:     Encounter Date: 09/27/22      Patient ID: Emy Medina is a 57 y.o. female.    Chief Complaint: Hypertension  HPI:   This is a 57-year-old woman with hypertension, PE, CVA.  When I met her in April 2022 she had recently been in the emergency with chest pain.  At that time she was severely hypertensive.  We have been working on her blood pressure and had actually pretty well controlled.  Then in August 2022 she noted some unilateral weakness in her arm and leg.  She waited a few days but eventually presented to the emergency room.  At that time she was found to have a lacunar stroke.  Her blood pressure medications were adjusted due to episodes of hypotension.  A few days later she presented back to the emergency room with severe hypertension.  Today she presents for follow-up.  She returns with a blood pressure log that shows that her blood pressure is controlled only about a third of the time.  Pressures are up to the 150s to 160s usually in the afternoon and evening.  At the last visit I suggested she take valsartan 160 twice daily, however she has been taking 80 mg twice daily.      She works in the cafeteria of a school.  She is .  She has never smoked.  She does not drink alcohol.  Her history of PE was back in 2016 which occurred after hysterectomy.  She was treated with anticoagulation for 6 months.  The following portions of the patient's history were reviewed and updated as appropriate: allergies, current medications, past family history, past medical history, past social history, past surgical history and problem list.     REVIEW OF SYSTEMS:   All systems reviewed.  Pertinent positives identified in HPI.  All other systems are negative.    Past Medical History:   Diagnosis Date   • Arthritis    • Hypertension    • PE (pulmonary embolism)    • Pleurisy        No family history on file.    Social History     Socioeconomic History   • Marital status:    Tobacco Use   •  Smoking status: Never Smoker   • Smokeless tobacco: Never Used   Vaping Use   • Vaping Use: Never used   Substance and Sexual Activity   • Alcohol use: No   • Drug use: No   • Sexual activity: Yes     Partners: Male       Allergies   Allergen Reactions   • Doxycycline    • Sulfa Antibiotics Hives       Past Surgical History:   Procedure Laterality Date   • CATARACT EXTRACTION     • HYSTERECTOMY     • LASIK     • SINUS SURGERY         Procedures       Objective:         PHYSICAL EXAM:  GEN: VSS, no distress,   Eyes: normal sclera, normal lids and lashes  HENT: moist mucus membranes,   Respiratory: CTAB, no rales or wheezes  CV: RRR, no murmurs, , +2 DP and 2+ carotid pulses b/l  GI: NABS, soft,  Nontender, nondistended  MSK: no edema, no scoliosis or kyphosis  Skin: no rash, warm, dry  Heme/Lymph: no bruising or bleeding  Psych: organized thought, normal behavior and affect  Neuro: Cranial nerves grossly intact, Alert and Oriented x 3.         Assessment:          Diagnosis Plan   1. Primary hypertension          Plan:       1.  Hypertension: Continue Coreg 12.5 twice daily.  She does not tolerate higher doses due to bradycardia.  Continue hydralazine 25 every 8 hours.  Increase valsartan to 160 mg twice daily.  2.  Chest pain: Resolved  3.  History of PE 2016 postoperative hysterectomy  4.  CVA August 2022: Aspirin, Lipitor 40, blood pressure troll    Dr. Bush, thank you very much for referring this kind patient to me. Please call me with any questions or concerns. I will see the patient again in the office in 3 months.          Leighann Echols MD  09/27/22  [facility]    Outpatient Encounter Medications as of 9/27/2022   Medication Sig Dispense Refill   • valsartan (DIOVAN) 160 MG tablet Take 1 tablet by mouth 2 (Two) Times a Day. (Patient taking differently: Take 160 mg by mouth 2 (Two) Times a Day. Pt takes 1/2 tab qam and 1/2 tab qhs) 90 tablet 3   • aspirin 81 MG chewable tablet Chew 1  tablet Daily. 90 tablet 0   • atorvastatin (LIPITOR) 40 MG tablet Take 1 tablet by mouth Every Night. 30 tablet 0   • Calcium Carbonate-Vit D-Min (CALCIUM 1200 PO) Take  by mouth.     • carvedilol (COREG) 12.5 MG tablet Take 1 tablet by mouth 2 (Two) Times a Day. 60 tablet 0   • Cholecalciferol (Vitamin D3) 10 MCG (400 UNIT) capsule Daily.     • hydrALAZINE (APRESOLINE) 25 MG tablet TAKE 1 TABLET BY MOUTH THREE TIMES A  tablet 1   • nitrofurantoin, macrocrystal-monohydrate, (MACROBID) 100 MG capsule TAKE 1 CAPSULE BY MOUTH TWICE A DAY FOR 7 DAYS       No facility-administered encounter medications on file as of 9/27/2022.

## 2022-10-05 ENCOUNTER — TELEPHONE (OUTPATIENT)
Dept: NEUROLOGY | Facility: CLINIC | Age: 57
End: 2022-10-05

## 2022-10-06 ENCOUNTER — OFFICE VISIT (OUTPATIENT)
Dept: NEUROLOGY | Facility: CLINIC | Age: 57
End: 2022-10-06

## 2022-10-06 VITALS
SYSTOLIC BLOOD PRESSURE: 108 MMHG | HEIGHT: 66 IN | OXYGEN SATURATION: 93 % | HEART RATE: 54 BPM | DIASTOLIC BLOOD PRESSURE: 60 MMHG | WEIGHT: 154.9 LBS | BODY MASS INDEX: 24.89 KG/M2

## 2022-10-06 DIAGNOSIS — M54.50 LOW BACK PAIN WITHOUT SCIATICA, UNSPECIFIED BACK PAIN LATERALITY, UNSPECIFIED CHRONICITY: ICD-10-CM

## 2022-10-06 DIAGNOSIS — Z86.711 HISTORY OF PULMONARY EMBOLISM: ICD-10-CM

## 2022-10-06 DIAGNOSIS — Z91.89 AT RISK FOR OBSTRUCTIVE SLEEP APNEA: ICD-10-CM

## 2022-10-06 DIAGNOSIS — Z86.73 HISTORY OF STROKE: Primary | ICD-10-CM

## 2022-10-06 PROCEDURE — 99214 OFFICE O/P EST MOD 30 MIN: CPT | Performed by: NURSE PRACTITIONER

## 2022-10-06 NOTE — PROGRESS NOTES
DOS: 10/7/2022  NAME: Emy Medina   : 1965  PCP: Clem Bush MD    Chief Complaint   Patient presents with   • Stroke     Hospital f/u   Patient is accompanied by her  who assist with providing some portion of medical history        Neurological Problem and Interval History:  57 y.o. female with a Hx hypertension, remote PE (after hysterectomy); previously on anticoagulation since discontinued, chronic back pain/arthritis who I am seeing today in follow-up for hospitalization from 2022 where patient presented with several days of worsening right-sided weakness.  MRI confirmed left maribel infarct.  Vessel imaging did not show any evidence of high-grade stenosis/aneurysm and/or dissection.  Surface echo showed EF 62.1%.  LV normal.  LA mildly dilated.  Saline test negative.  Left atrial volume index 27.4.  Etiology of stroke felt to be secondary to uncontrolled risk factors specifically hypertension.  Patient was discharged home on antiplatelet and high-dose statin; she was on neither of these prior to the event.  Patient discharged to home and went back to work fairly quickly she reports no residual deficits although she has some generalized fatigue/intermittent dizziness/headache/nervousness and anxiousness.  Her blood pressure readings have been labile today BP at home 90s over 60s prior readings mid August 170s to 190s over 90s.      Since last evaluation, she denies any new signs and/or symptoms of stroke.  She does report after the stroke it took 1 to 2 weeks before her handwriting to improve.  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  She denies any recent illnesses/falls under hospitalizations.  She denies any concerns and/or issues with mood specifically no concern for PBA and/or depression.  Although, patient does report being anxious and nervous since CVA which she attributes to being manageable.  Patient at moderate risk for obstructive sleep apnea  recommend outpatient MEGAN evaluation which she is agreeable to.  Patient denies any family history of stroke and/or hypercoagulable state.  Patient does report worsening back pain since CVA as she was instructed to discontinue Mobic she rates pain on a daily basis 6 out of 10-this could be contributing to elevated BPs.  Would consider resuming Mobic with aspirin and covering patient with PPI versus low-dose of gabapentin-notify of the risk.  I will reach out to cardiology regarding labile blood pressures and discuss further resuming Mobic.  We will plan to see patient back in 3 to 6 months; sooner if needed.       Review of Systems:        Review of Systems   Constitutional: Positive for fatigue. Negative for activity change, appetite change and unexpected weight change.   HENT: Negative for ear pain, facial swelling, hearing loss, nosebleeds, tinnitus, trouble swallowing and voice change.    Eyes: Negative for photophobia, pain and visual disturbance.   Respiratory: Negative for choking, chest tightness, shortness of breath, wheezing and stridor.    Cardiovascular: Negative for chest pain, palpitations and leg swelling.   Gastrointestinal: Negative for abdominal pain, constipation, diarrhea, nausea and vomiting.   Endocrine: Negative for cold intolerance and heat intolerance.   Genitourinary: Negative for decreased urine volume, difficulty urinating, dysuria, frequency and urgency.   Musculoskeletal: Negative for gait problem, joint swelling, neck pain and neck stiffness.   Skin: Negative for color change, pallor, rash and wound.   Allergic/Immunologic: Negative for food allergies.   Neurological: Positive for dizziness and headaches. Negative for tremors, facial asymmetry, speech difficulty, weakness, light-headedness and numbness.   Hematological: Does not bruise/bleed easily.   Psychiatric/Behavioral: Positive for sleep disturbance (sleeplessness). Negative for confusion and decreased concentration. The patient  "is nervous/anxious.          Current Outpatient Medications:   •  aspirin 81 MG chewable tablet, Chew 1 tablet Daily., Disp: 90 tablet, Rfl: 0  •  atorvastatin (LIPITOR) 40 MG tablet, Take 1 tablet by mouth Every Night., Disp: 30 tablet, Rfl: 0  •  Calcium Carbonate-Vit D-Min (CALCIUM 1200 PO), Take  by mouth., Disp: , Rfl:   •  carvedilol (COREG) 12.5 MG tablet, Take 1 tablet by mouth 2 (Two) Times a Day., Disp: 60 tablet, Rfl: 0  •  Cholecalciferol (Vitamin D3) 10 MCG (400 UNIT) capsule, Daily., Disp: , Rfl:   •  hydrALAZINE (APRESOLINE) 25 MG tablet, TAKE 1 TABLET BY MOUTH THREE TIMES A DAY, Disp: 270 tablet, Rfl: 1  •  valsartan (DIOVAN) 160 MG tablet, Take 1 tablet by mouth 2 (Two) Times a Day. (Patient taking differently: Take 160 mg by mouth 2 (Two) Times a Day. Pt takes 1/2 tab qam and 1/2 tab qhs), Disp: 90 tablet, Rfl: 3    \"The following portions of the patient's history were reviewed and updated as appropriate: allergies, current medications, past family history, past medical history, past social history, past surgical history and problem list.\"  Review and Interpretation of Imaging:  Imaging discussed above reviewed  Laboratory Results:             Lab Results   Component Value Date    HGBA1C 5.50 08/03/2022         Lab Results   Component Value Date    CHOL 127 08/03/2022         Lab Results   Component Value Date    HDL 27 (L) 08/03/2022    HDL 33 (L) 06/04/2020    HDL 36 (L) 04/01/2019         Lab Results   Component Value Date    LDL 77 08/03/2022     06/04/2020    LDL 87 04/01/2019         Lab Results   Component Value Date    TRIG 128 08/03/2022    TRIG 138 06/04/2020    TRIG 137 04/01/2019     No results found for: RPR  Lab Results   Component Value Date    TSH 2.730 08/03/2022     No results found for: [id number]    Physical Examination: NIHSS: 0 mRS: 0  General Appearance:   Well developed, well nourished, well groomed, alert, and cooperative.  HEENT: Normocephalic.    Neck and " Spine: Normal range of motion.  Normal alignment. No mass or tenderness. No bruits.  Cardiac: Regular rate and rhythm. No murmurs.  Peripheral Vasculature: Radial and pedal pulses are equal and symmetric.  Extremities:    No edema or deformities. Normal joint ROM.  Skin:    No rashes or birth marks.    Neurological examination:  Higher Integrative  Function: Oriented to time, place and person. Normal registration, recall, attention span and concentration. Normal language including comprehension, spontaneous speech, repetition, reading, writing, naming and vocabulary. No neglect with normal visual-spatial function and construction. Normal fund of knowledge and higher integrative function.  CN II: Pupils are equal, round, and reactive to light. Normal visual acuity and visual fields.    CN III IV VI: Extraocular movements are full without nystagmus.   CN V: Normal facial sensation and strength of muscles of mastication.  CN VII: Facial movements are symmetric. No weakness.  CN VIII:   Auditory acuity is normal.  CN IX & X:   Symmetric palatal movement.  CN XI: Sternocleidomastoid and trapezius are normal.  No weakness.  CN XII:   The tongue is midline.  No atrophy or fasciculations.  Motor: Normal muscle strength, bulk and tone in upper and lower extremities.  No fasciculations, rigidity, spasticity, or abnormal movements.  Reflexes: Plantar responses are flexor.  Sensation: Normal to light touch in arms and legs.  Station and Gait: Antalgic appearing gait and station.    Coordination:  Finger to nose test shows no dysmetria.       Diagnoses:    1.  History of left maribel stroke etiology felt to be secondary to uncontrolled risk factors hypertension  2.  chronic back pain   3. history of pulmonary embolus  4.  At moderate risk for obstructive sleep        Plan:   Continue aspirin 81 mg daily, atorvastatin 40 mg daily   Outpatient MEGAN evaluation   Consider hypercoagulable panel given history of PE although occurred after  surgical procedure   Consider addition of gabapentin to assist with back discomfort   Blood pressure control to <130/80   Goal LDL <70-recommend high dose statins-    Serum glucose < 140   Call 911 for stroke any stroke symptoms   Follow-up with me in 6 months    Message sent to cardiologist regarding fluctuating blood pressures- awaiting response    Diagnoses and all orders for this visit:    1. History of stroke (Primary)    2. At risk for obstructive sleep apnea  -     Ambulatory Referral to Sleep Medicine    3. History of pulmonary embolism    Addenda:  Cardiologist aware of labile blood pressures; working to titrate medication to systolic and diastolic blood pressure goal.  She will continue to manage and follow.  Notify patient.  Spoke with patient regarding low-dose gabapentin which might assist with back discomfort given Mobic has been removed due to interaction with friends.  We will start at 100 mg in morning 100 mg midday and 200 mg nightly which patient is agreeable to.  Risk and benefits and side effects of medication discussed with patient she verbalizes understanding and agrees to treatment plan.

## 2022-10-07 RX ORDER — GABAPENTIN 100 MG/1
CAPSULE ORAL
Qty: 120 CAPSULE | Refills: 0 | Status: SHIPPED | OUTPATIENT
Start: 2022-10-07 | End: 2022-11-14 | Stop reason: SDUPTHER

## 2022-11-14 DIAGNOSIS — M54.50 LOW BACK PAIN WITHOUT SCIATICA, UNSPECIFIED BACK PAIN LATERALITY, UNSPECIFIED CHRONICITY: ICD-10-CM

## 2022-11-14 RX ORDER — GABAPENTIN 100 MG/1
CAPSULE ORAL
Qty: 120 CAPSULE | Refills: 2 | Status: SHIPPED | OUTPATIENT
Start: 2022-11-14 | End: 2023-02-13 | Stop reason: SDUPTHER

## 2022-11-14 RX ORDER — CARVEDILOL 12.5 MG/1
12.5 TABLET ORAL 2 TIMES DAILY
Qty: 180 TABLET | Refills: 3 | Status: SHIPPED | OUTPATIENT
Start: 2022-11-14

## 2022-11-17 DIAGNOSIS — I63.9 CRYPTOGENIC STROKE: Primary | ICD-10-CM

## 2022-11-18 ENCOUNTER — LAB (OUTPATIENT)
Dept: LAB | Facility: HOSPITAL | Age: 57
End: 2022-11-18

## 2022-11-18 DIAGNOSIS — I63.9 CRYPTOGENIC STROKE: ICD-10-CM

## 2022-11-18 LAB
FIBRINOGEN PPP-MCNC: 352 MG/DL (ref 219–464)
HCYS SERPL-MCNC: 13.9 UMOL/L (ref 0–15)

## 2022-11-18 PROCEDURE — 83090 ASSAY OF HOMOCYSTEINE: CPT

## 2022-11-18 PROCEDURE — 83520 IMMUNOASSAY QUANT NOS NONAB: CPT

## 2022-11-18 PROCEDURE — 85810 BLOOD VISCOSITY EXAMINATION: CPT

## 2022-11-18 PROCEDURE — 85732 THROMBOPLASTIN TIME PARTIAL: CPT

## 2022-11-18 PROCEDURE — 85300 ANTITHROMBIN III ACTIVITY: CPT

## 2022-11-18 PROCEDURE — 85306 CLOT INHIBIT PROT S FREE: CPT

## 2022-11-18 PROCEDURE — 85613 RUSSELL VIPER VENOM DILUTED: CPT

## 2022-11-18 PROCEDURE — 85705 THROMBOPLASTIN INHIBITION: CPT

## 2022-11-18 PROCEDURE — 86147 CARDIOLIPIN ANTIBODY EA IG: CPT

## 2022-11-18 PROCEDURE — 85220 BLOOC CLOT FACTOR V TEST: CPT

## 2022-11-18 PROCEDURE — 85670 THROMBIN TIME PLASMA: CPT

## 2022-11-18 PROCEDURE — 85610 PROTHROMBIN TIME: CPT

## 2022-11-18 PROCEDURE — 36415 COLL VENOUS BLD VENIPUNCTURE: CPT

## 2022-11-18 PROCEDURE — 85384 FIBRINOGEN ACTIVITY: CPT

## 2022-11-18 PROCEDURE — 85305 CLOT INHIBIT PROT S TOTAL: CPT

## 2022-11-18 PROCEDURE — 81240 F2 GENE: CPT

## 2022-11-18 PROCEDURE — 85611 PROTHROMBIN TEST: CPT

## 2022-11-18 PROCEDURE — 85302 CLOT INHIBIT PROT C ANTIGEN: CPT

## 2022-11-18 PROCEDURE — 85335 FACTOR INHIBITOR TEST: CPT

## 2022-11-18 PROCEDURE — 85730 THROMBOPLASTIN TIME PARTIAL: CPT

## 2022-11-19 LAB
APTT SCREEN TO CONFIRM RATIO: 1.03 RATIO (ref 0–1.34)
CARDIOLIPIN IGG SER IA-ACNC: <9 GPL U/ML (ref 0–14)
CARDIOLIPIN IGM SER IA-ACNC: <9 MPL U/ML (ref 0–12)
CONFIRM APTT/NORMAL: 34.4 SEC (ref 0–47.6)
LA 2 SCREEN W REFLEX-IMP: NORMAL
SCREEN APTT: 32.1 SEC (ref 0–51.9)
SCREEN DRVVT: 32.2 SEC (ref 0–47)
THROMBIN TIME: 18.7 SEC (ref 0–23)

## 2022-11-21 LAB
APTT PPP: 25.7 SEC
FACT V ACT/NOR PPP: 64 %
FACT V INHIB PPP-ACNC: ABNORMAL BETHESDA
FACTOR II, DNA ANALYSIS: NORMAL
PROTHROMBIN TIME: 11 SEC
PT 1H NP CONT PPP: ABNORMAL SEC
PT 1H NP PPP: ABNORMAL SEC
PT IMM NP PPP: ABNORMAL SEC

## 2022-11-22 LAB — AT III PPP CHRO-ACNC: 123 % (ref 90–134)

## 2022-11-23 LAB
PROT C AG ACT/NOR PPP IA: 92 % (ref 60–150)
PROT S AG ACT/NOR PPP IA: 93 % (ref 60–150)
PROT S FREE AG ACT/NOR PPP IA: 96 % (ref 61–136)

## 2022-11-24 LAB — VISC SER: 1.6 REL.SALINE (ref 1.4–2.1)

## 2022-11-25 PROCEDURE — 99284 EMERGENCY DEPT VISIT MOD MDM: CPT

## 2022-11-26 ENCOUNTER — APPOINTMENT (OUTPATIENT)
Dept: GENERAL RADIOLOGY | Facility: HOSPITAL | Age: 57
End: 2022-11-26

## 2022-11-26 ENCOUNTER — HOSPITAL ENCOUNTER (EMERGENCY)
Facility: HOSPITAL | Age: 57
Discharge: HOME | End: 2022-11-26
Attending: EMERGENCY MEDICINE | Admitting: EMERGENCY MEDICINE

## 2022-11-26 VITALS
SYSTOLIC BLOOD PRESSURE: 181 MMHG | HEART RATE: 45 BPM | DIASTOLIC BLOOD PRESSURE: 94 MMHG | TEMPERATURE: 98 F | OXYGEN SATURATION: 98 % | WEIGHT: 160 LBS | RESPIRATION RATE: 16 BRPM | HEIGHT: 66 IN | BODY MASS INDEX: 25.71 KG/M2

## 2022-11-26 DIAGNOSIS — I10 HYPERTENSION, UNSPECIFIED TYPE: Primary | ICD-10-CM

## 2022-11-26 LAB
ALBUMIN SERPL-MCNC: 4.2 G/DL (ref 3.5–5.2)
ALBUMIN/GLOB SERPL: 1.4 G/DL
ALP SERPL-CCNC: 98 U/L (ref 39–117)
ALT SERPL W P-5'-P-CCNC: 24 U/L (ref 1–33)
ANION GAP SERPL CALCULATED.3IONS-SCNC: 8 MMOL/L (ref 5–15)
AST SERPL-CCNC: 25 U/L (ref 1–32)
BASOPHILS # BLD AUTO: 0.08 10*3/MM3 (ref 0–0.2)
BASOPHILS NFR BLD AUTO: 1.1 % (ref 0–1.5)
BILIRUB SERPL-MCNC: 0.5 MG/DL (ref 0–1.2)
BUN SERPL-MCNC: 16 MG/DL (ref 6–20)
BUN/CREAT SERPL: 18 (ref 7–25)
CALCIUM SPEC-SCNC: 10.3 MG/DL (ref 8.6–10.5)
CHLORIDE SERPL-SCNC: 106 MMOL/L (ref 98–107)
CO2 SERPL-SCNC: 27 MMOL/L (ref 22–29)
CREAT SERPL-MCNC: 0.89 MG/DL (ref 0.57–1)
DEPRECATED RDW RBC AUTO: 42.6 FL (ref 37–54)
EGFRCR SERPLBLD CKD-EPI 2021: 74.8 ML/MIN/1.73
EOSINOPHIL # BLD AUTO: 0.47 10*3/MM3 (ref 0–0.4)
EOSINOPHIL NFR BLD AUTO: 6.4 % (ref 0.3–6.2)
ERYTHROCYTE [DISTWIDTH] IN BLOOD BY AUTOMATED COUNT: 12.4 % (ref 12.3–15.4)
GLOBULIN UR ELPH-MCNC: 3 GM/DL
GLUCOSE SERPL-MCNC: 110 MG/DL (ref 65–99)
HCT VFR BLD AUTO: 43.8 % (ref 34–46.6)
HGB BLD-MCNC: 14.3 G/DL (ref 12–15.9)
HOLD SPECIMEN: NORMAL
HOLD SPECIMEN: NORMAL
IMM GRANULOCYTES # BLD AUTO: 0.04 10*3/MM3 (ref 0–0.05)
IMM GRANULOCYTES NFR BLD AUTO: 0.5 % (ref 0–0.5)
LYMPHOCYTES # BLD AUTO: 2.14 10*3/MM3 (ref 0.7–3.1)
LYMPHOCYTES NFR BLD AUTO: 28.9 % (ref 19.6–45.3)
MCH RBC QN AUTO: 30.5 PG (ref 26.6–33)
MCHC RBC AUTO-ENTMCNC: 32.6 G/DL (ref 31.5–35.7)
MCV RBC AUTO: 93.4 FL (ref 79–97)
MONOCYTES # BLD AUTO: 0.55 10*3/MM3 (ref 0.1–0.9)
MONOCYTES NFR BLD AUTO: 7.4 % (ref 5–12)
NEUTROPHILS NFR BLD AUTO: 4.12 10*3/MM3 (ref 1.7–7)
NEUTROPHILS NFR BLD AUTO: 55.7 % (ref 42.7–76)
NRBC BLD AUTO-RTO: 0 /100 WBC (ref 0–0.2)
PLATELET # BLD AUTO: 205 10*3/MM3 (ref 140–450)
PMV BLD AUTO: 10.8 FL (ref 6–12)
POTASSIUM SERPL-SCNC: 5.2 MMOL/L (ref 3.5–5.2)
PROT SERPL-MCNC: 7.2 G/DL (ref 6–8.5)
QT INTERVAL: 458 MS
RBC # BLD AUTO: 4.69 10*6/MM3 (ref 3.77–5.28)
SODIUM SERPL-SCNC: 141 MMOL/L (ref 136–145)
TROPONIN T SERPL-MCNC: <0.01 NG/ML (ref 0–0.03)
TROPONIN T SERPL-MCNC: <0.01 NG/ML (ref 0–0.03)
WBC NRBC COR # BLD: 7.4 10*3/MM3 (ref 3.4–10.8)
WHOLE BLOOD HOLD COAG: NORMAL
WHOLE BLOOD HOLD SPECIMEN: NORMAL

## 2022-11-26 PROCEDURE — 93010 ELECTROCARDIOGRAM REPORT: CPT | Performed by: INTERNAL MEDICINE

## 2022-11-26 PROCEDURE — 84484 ASSAY OF TROPONIN QUANT: CPT

## 2022-11-26 PROCEDURE — 36415 COLL VENOUS BLD VENIPUNCTURE: CPT

## 2022-11-26 PROCEDURE — 85025 COMPLETE CBC W/AUTO DIFF WBC: CPT

## 2022-11-26 PROCEDURE — 93005 ELECTROCARDIOGRAM TRACING: CPT | Performed by: EMERGENCY MEDICINE

## 2022-11-26 PROCEDURE — 71046 X-RAY EXAM CHEST 2 VIEWS: CPT

## 2022-11-26 PROCEDURE — 80053 COMPREHEN METABOLIC PANEL: CPT

## 2022-11-26 PROCEDURE — 84484 ASSAY OF TROPONIN QUANT: CPT | Performed by: EMERGENCY MEDICINE

## 2022-11-26 RX ORDER — HYDRALAZINE HYDROCHLORIDE 25 MG/1
25 TABLET, FILM COATED ORAL ONCE
Status: COMPLETED | OUTPATIENT
Start: 2022-11-26 | End: 2022-11-26

## 2022-11-26 RX ORDER — SODIUM CHLORIDE 0.9 % (FLUSH) 0.9 %
10 SYRINGE (ML) INJECTION AS NEEDED
Status: DISCONTINUED | OUTPATIENT
Start: 2022-11-26 | End: 2022-11-26 | Stop reason: HOSPADM

## 2022-11-26 RX ORDER — ASPIRIN 325 MG
325 TABLET ORAL ONCE
Status: DISCONTINUED | OUTPATIENT
Start: 2022-11-26 | End: 2022-11-26

## 2022-11-26 RX ADMIN — HYDRALAZINE HYDROCHLORIDE 25 MG: 25 TABLET, FILM COATED ORAL at 08:33

## 2022-11-29 RX ORDER — HYDRALAZINE HYDROCHLORIDE 25 MG/1
25 TABLET, FILM COATED ORAL 3 TIMES DAILY
Qty: 270 TABLET | Refills: 1 | Status: SHIPPED | OUTPATIENT
Start: 2022-11-29 | End: 2023-03-22 | Stop reason: HOSPADM

## 2022-12-08 ENCOUNTER — HOSPITAL ENCOUNTER (OUTPATIENT)
Facility: HOSPITAL | Age: 57
Setting detail: OBSERVATION
Discharge: HOME | End: 2022-12-09
Attending: EMERGENCY MEDICINE | Admitting: EMERGENCY MEDICINE

## 2022-12-08 ENCOUNTER — APPOINTMENT (OUTPATIENT)
Dept: CT IMAGING | Facility: HOSPITAL | Age: 57
End: 2022-12-08

## 2022-12-08 ENCOUNTER — APPOINTMENT (OUTPATIENT)
Dept: GENERAL RADIOLOGY | Facility: HOSPITAL | Age: 57
End: 2022-12-08

## 2022-12-08 DIAGNOSIS — I10 PRIMARY HYPERTENSION: ICD-10-CM

## 2022-12-08 DIAGNOSIS — E78.00 HYPERCHOLESTEREMIA: ICD-10-CM

## 2022-12-08 DIAGNOSIS — Z86.73 HISTORY OF CVA (CEREBROVASCULAR ACCIDENT): ICD-10-CM

## 2022-12-08 DIAGNOSIS — R07.9 CHEST PAIN, UNSPECIFIED TYPE: Primary | ICD-10-CM

## 2022-12-08 LAB
ALBUMIN SERPL-MCNC: 4.6 G/DL (ref 3.5–5.2)
ALBUMIN/GLOB SERPL: 1.8 G/DL
ALP SERPL-CCNC: 95 U/L (ref 39–117)
ALT SERPL W P-5'-P-CCNC: 21 U/L (ref 1–33)
ANION GAP SERPL CALCULATED.3IONS-SCNC: 10 MMOL/L (ref 5–15)
AST SERPL-CCNC: 25 U/L (ref 1–32)
BASOPHILS # BLD AUTO: 0.08 10*3/MM3 (ref 0–0.2)
BASOPHILS NFR BLD AUTO: 0.8 % (ref 0–1.5)
BILIRUB SERPL-MCNC: 0.6 MG/DL (ref 0–1.2)
BUN SERPL-MCNC: 20 MG/DL (ref 6–20)
BUN/CREAT SERPL: 22.5 (ref 7–25)
CALCIUM SPEC-SCNC: 9.7 MG/DL (ref 8.6–10.5)
CHLORIDE SERPL-SCNC: 106 MMOL/L (ref 98–107)
CHOLEST SERPL-MCNC: 89 MG/DL (ref 0–200)
CO2 SERPL-SCNC: 25 MMOL/L (ref 22–29)
CREAT SERPL-MCNC: 0.89 MG/DL (ref 0.57–1)
D DIMER PPP FEU-MCNC: <0.27 MCGFEU/ML (ref 0–0.59)
DEPRECATED RDW RBC AUTO: 40.6 FL (ref 37–54)
EGFRCR SERPLBLD CKD-EPI 2021: 74.8 ML/MIN/1.73
EOSINOPHIL # BLD AUTO: 0.36 10*3/MM3 (ref 0–0.4)
EOSINOPHIL NFR BLD AUTO: 3.7 % (ref 0.3–6.2)
ERYTHROCYTE [DISTWIDTH] IN BLOOD BY AUTOMATED COUNT: 11.9 % (ref 12.3–15.4)
GLOBULIN UR ELPH-MCNC: 2.6 GM/DL
GLUCOSE SERPL-MCNC: 104 MG/DL (ref 65–99)
HCT VFR BLD AUTO: 43.4 % (ref 34–46.6)
HDLC SERPL-MCNC: 35 MG/DL (ref 40–60)
HGB BLD-MCNC: 14.5 G/DL (ref 12–15.9)
HOLD SPECIMEN: NORMAL
HOLD SPECIMEN: NORMAL
IMM GRANULOCYTES # BLD AUTO: 0.04 10*3/MM3 (ref 0–0.05)
IMM GRANULOCYTES NFR BLD AUTO: 0.4 % (ref 0–0.5)
LDLC SERPL CALC-MCNC: 37 MG/DL (ref 0–100)
LDLC/HDLC SERPL: 1.07 {RATIO}
LYMPHOCYTES # BLD AUTO: 1.89 10*3/MM3 (ref 0.7–3.1)
LYMPHOCYTES NFR BLD AUTO: 19.3 % (ref 19.6–45.3)
MCH RBC QN AUTO: 30.9 PG (ref 26.6–33)
MCHC RBC AUTO-ENTMCNC: 33.4 G/DL (ref 31.5–35.7)
MCV RBC AUTO: 92.3 FL (ref 79–97)
MONOCYTES # BLD AUTO: 0.57 10*3/MM3 (ref 0.1–0.9)
MONOCYTES NFR BLD AUTO: 5.8 % (ref 5–12)
NEUTROPHILS NFR BLD AUTO: 6.84 10*3/MM3 (ref 1.7–7)
NEUTROPHILS NFR BLD AUTO: 70 % (ref 42.7–76)
NRBC BLD AUTO-RTO: 0 /100 WBC (ref 0–0.2)
PLATELET # BLD AUTO: 197 10*3/MM3 (ref 140–450)
PMV BLD AUTO: 10.5 FL (ref 6–12)
POTASSIUM SERPL-SCNC: 4.1 MMOL/L (ref 3.5–5.2)
PROT SERPL-MCNC: 7.2 G/DL (ref 6–8.5)
QT INTERVAL: 437 MS
RBC # BLD AUTO: 4.7 10*6/MM3 (ref 3.77–5.28)
SODIUM SERPL-SCNC: 141 MMOL/L (ref 136–145)
TRIGL SERPL-MCNC: 82 MG/DL (ref 0–150)
TROPONIN T SERPL-MCNC: <0.01 NG/ML (ref 0–0.03)
TROPONIN T SERPL-MCNC: <0.01 NG/ML (ref 0–0.03)
VLDLC SERPL-MCNC: 17 MG/DL (ref 5–40)
WBC NRBC COR # BLD: 9.78 10*3/MM3 (ref 3.4–10.8)
WHOLE BLOOD HOLD COAG: NORMAL
WHOLE BLOOD HOLD SPECIMEN: NORMAL

## 2022-12-08 PROCEDURE — 71275 CT ANGIOGRAPHY CHEST: CPT

## 2022-12-08 PROCEDURE — 80061 LIPID PANEL: CPT | Performed by: NURSE PRACTITIONER

## 2022-12-08 PROCEDURE — 80053 COMPREHEN METABOLIC PANEL: CPT | Performed by: EMERGENCY MEDICINE

## 2022-12-08 PROCEDURE — 85025 COMPLETE CBC W/AUTO DIFF WBC: CPT | Performed by: EMERGENCY MEDICINE

## 2022-12-08 PROCEDURE — 99285 EMERGENCY DEPT VISIT HI MDM: CPT

## 2022-12-08 PROCEDURE — G0378 HOSPITAL OBSERVATION PER HR: HCPCS

## 2022-12-08 PROCEDURE — 25010000002 ONDANSETRON PER 1 MG: Performed by: EMERGENCY MEDICINE

## 2022-12-08 PROCEDURE — 93005 ELECTROCARDIOGRAM TRACING: CPT | Performed by: EMERGENCY MEDICINE

## 2022-12-08 PROCEDURE — 36415 COLL VENOUS BLD VENIPUNCTURE: CPT | Performed by: EMERGENCY MEDICINE

## 2022-12-08 PROCEDURE — 85379 FIBRIN DEGRADATION QUANT: CPT | Performed by: NURSE PRACTITIONER

## 2022-12-08 PROCEDURE — 0 IOPAMIDOL PER 1 ML: Performed by: EMERGENCY MEDICINE

## 2022-12-08 PROCEDURE — 71046 X-RAY EXAM CHEST 2 VIEWS: CPT

## 2022-12-08 PROCEDURE — 84484 ASSAY OF TROPONIN QUANT: CPT | Performed by: EMERGENCY MEDICINE

## 2022-12-08 PROCEDURE — 93010 ELECTROCARDIOGRAM REPORT: CPT | Performed by: INTERNAL MEDICINE

## 2022-12-08 PROCEDURE — 96375 TX/PRO/DX INJ NEW DRUG ADDON: CPT

## 2022-12-08 PROCEDURE — 96374 THER/PROPH/DIAG INJ IV PUSH: CPT

## 2022-12-08 PROCEDURE — 25010000002 HYDROMORPHONE PER 4 MG: Performed by: EMERGENCY MEDICINE

## 2022-12-08 PROCEDURE — 36415 COLL VENOUS BLD VENIPUNCTURE: CPT

## 2022-12-08 PROCEDURE — 25010000002 KETOROLAC TROMETHAMINE PER 15 MG: Performed by: EMERGENCY MEDICINE

## 2022-12-08 RX ORDER — HYDROMORPHONE HYDROCHLORIDE 1 MG/ML
0.5 INJECTION, SOLUTION INTRAMUSCULAR; INTRAVENOUS; SUBCUTANEOUS ONCE
Status: COMPLETED | OUTPATIENT
Start: 2022-12-08 | End: 2022-12-08

## 2022-12-08 RX ORDER — KETOROLAC TROMETHAMINE 15 MG/ML
15 INJECTION, SOLUTION INTRAMUSCULAR; INTRAVENOUS ONCE
Status: COMPLETED | OUTPATIENT
Start: 2022-12-08 | End: 2022-12-08

## 2022-12-08 RX ORDER — SODIUM CHLORIDE 0.9 % (FLUSH) 0.9 %
10 SYRINGE (ML) INJECTION AS NEEDED
Status: DISCONTINUED | OUTPATIENT
Start: 2022-12-08 | End: 2022-12-09 | Stop reason: HOSPADM

## 2022-12-08 RX ORDER — GABAPENTIN 100 MG/1
200 CAPSULE ORAL NIGHTLY
Status: DISCONTINUED | OUTPATIENT
Start: 2022-12-08 | End: 2022-12-09 | Stop reason: HOSPADM

## 2022-12-08 RX ORDER — HYDRALAZINE HYDROCHLORIDE 10 MG/1
25 TABLET, FILM COATED ORAL 3 TIMES DAILY
Status: DISCONTINUED | OUTPATIENT
Start: 2022-12-08 | End: 2022-12-09 | Stop reason: HOSPADM

## 2022-12-08 RX ORDER — VALSARTAN 160 MG/1
160 TABLET ORAL 2 TIMES DAILY
Status: DISCONTINUED | OUTPATIENT
Start: 2022-12-08 | End: 2022-12-09 | Stop reason: HOSPADM

## 2022-12-08 RX ORDER — ASPIRIN 325 MG
325 TABLET ORAL ONCE
Status: DISCONTINUED | OUTPATIENT
Start: 2022-12-08 | End: 2022-12-09 | Stop reason: HOSPADM

## 2022-12-08 RX ORDER — ONDANSETRON 2 MG/ML
4 INJECTION INTRAMUSCULAR; INTRAVENOUS ONCE
Status: COMPLETED | OUTPATIENT
Start: 2022-12-08 | End: 2022-12-08

## 2022-12-08 RX ORDER — ATORVASTATIN CALCIUM 20 MG/1
40 TABLET, FILM COATED ORAL NIGHTLY
Status: DISCONTINUED | OUTPATIENT
Start: 2022-12-08 | End: 2022-12-09 | Stop reason: HOSPADM

## 2022-12-08 RX ADMIN — GABAPENTIN 200 MG: 100 CAPSULE ORAL at 20:53

## 2022-12-08 RX ADMIN — ATORVASTATIN CALCIUM 40 MG: 20 TABLET, FILM COATED ORAL at 20:51

## 2022-12-08 RX ADMIN — HYDRALAZINE HYDROCHLORIDE 25 MG: 10 TABLET, FILM COATED ORAL at 20:53

## 2022-12-08 RX ADMIN — VALSARTAN 160 MG: 160 TABLET, FILM COATED ORAL at 20:53

## 2022-12-08 RX ADMIN — ONDANSETRON 4 MG: 2 INJECTION INTRAMUSCULAR; INTRAVENOUS at 18:16

## 2022-12-08 RX ADMIN — HYDROMORPHONE HYDROCHLORIDE 0.5 MG: 1 INJECTION, SOLUTION INTRAMUSCULAR; INTRAVENOUS; SUBCUTANEOUS at 18:16

## 2022-12-08 RX ADMIN — KETOROLAC TROMETHAMINE 15 MG: 15 INJECTION, SOLUTION INTRAMUSCULAR; INTRAVENOUS at 18:16

## 2022-12-08 RX ADMIN — IOPAMIDOL 95 ML: 755 INJECTION, SOLUTION INTRAVENOUS at 18:26

## 2022-12-08 NOTE — ED TRIAGE NOTES
Patient to er from home with c/o increasing in pain in left neck/ left shoulder and left chest. Patient reported it started last night and getting worse. Patient reported no blood thinners. Reported some soa because of the pain. Patient has mask on in triage along with staff.

## 2022-12-08 NOTE — ED PROVIDER NOTES
EMERGENCY DEPARTMENT ENCOUNTER    Room Number:  43/43  Date of encounter:  12/8/2022  PCP: Clem Bush MD  Historian: Patient, spouse      I used full protective equipment while examining this patient.  This includes face mask, gloves and protective eyewear.  I washed my hands before entering the room and immediately upon leaving the room      HPI:  Chief Complaint: Chest pain  A complete HPI/ROS/PMH/PSH/SH/FH are unobtainable due to: Nothing    Context: Emy Medina is a 57 y.o. female who presents to the ED c/o 2-day history of gradual onset, constant, progressively worsening chest pain.  Patient localizes the pain to the left upper anterior chest.  The pain does seem to radiate through to the back.  She describes the pain as sharp, stabbing.  The pain is worse with rotation of the head to the right, movement.  She states the pain seems to take her breath away however denies any overt shortness of breath.  She denies any associated nausea, vomiting.  She denies any exertional component.  She denies any underlying heart issues.  She does have a history of stroke August 2022.  She does have a remote history of pulmonary embolism, which happened after surgery.  She is not anticoagulated at this time.  She denies any significant family history of heart issues.  She was initially seen by her primary care physician who referred her to the ER for further evaluation.    Review of Medical Records  Reviewed patient's last family medicine office visit from 10/11/2022.  Patient being followed for history of CVA, hypertension.    PAST MEDICAL HISTORY  Active Ambulatory Problems     Diagnosis Date Noted   • Chest pain 02/17/2022   • Cerebrovascular accident (CVA) (HCC) 08/02/2022   • HTN (hypertension) 08/03/2022   • Bradycardia 08/03/2022   • FUENTES (acute kidney injury) (Formerly [facility]) 08/03/2022   • FUENTES (acute kidney injury) (Formerly [facility]) 08/03/2022     Resolved Ambulatory Problems     Diagnosis Date Noted   • No Resolved Ambulatory  Problems     Past Medical History:   Diagnosis Date   • Arthritis    • Hypertension    • PE (pulmonary embolism)    • Pleurisy          PAST SURGICAL HISTORY  Past Surgical History:   Procedure Laterality Date   • CATARACT EXTRACTION     • HYSTERECTOMY     • LASIK     • SINUS SURGERY           FAMILY HISTORY  Family History   Problem Relation Age of Onset   • Stroke Mother    • Hypertension Mother    • Multiple sclerosis Father          SOCIAL HISTORY  Social History     Socioeconomic History   • Marital status:    Tobacco Use   • Smoking status: Never   • Smokeless tobacco: Never   Vaping Use   • Vaping Use: Never used   Substance and Sexual Activity   • Alcohol use: No   • Drug use: No   • Sexual activity: Yes     Partners: Male         ALLERGIES  Doxycycline and Sulfa antibiotics        REVIEW OF SYSTEMS  All systems reviewed and negative except for those discussed in HPI.       PHYSICAL EXAM    I have reviewed the triage vital signs and nursing notes.    ED Triage Vitals [12/08/22 1529]   Temp Heart Rate Resp BP SpO2   98.2 °F (36.8 °C) 68 22 (!) 187/98 96 %      Temp src Heart Rate Source Patient Position BP Location FiO2 (%)   Tympanic -- -- -- --       Physical Exam  GENERAL: Alert, oriented, not distressed  HENT: head atraumatic, no nuchal rigidity  EYES: no scleral icterus, EOMI  CV: regular rhythm, bradycardic rate, no murmur  RESPIRATORY: normal effort, CTA  ABDOMEN: soft, nontender  MUSCULOSKELETAL: no deformity, FROM, no calf swelling or tenderness  NEURO: alert, mild dysarthric speech  SKIN: warm, dry        LAB RESULTS  Recent Results (from the past 24 hour(s))   ECG 12 Lead ED Triage Standing Order; Chest Pain    Collection Time: 12/08/22  3:34 PM   Result Value Ref Range    QT Interval 437 ms   Comprehensive Metabolic Panel    Collection Time: 12/08/22  3:53 PM    Specimen: Blood   Result Value Ref Range    Glucose 104 (H) 65 - 99 mg/dL    BUN 20 6 - 20 mg/dL    Creatinine 0.89 0.57 - 1.00  mg/dL    Sodium 141 136 - 145 mmol/L    Potassium 4.1 3.5 - 5.2 mmol/L    Chloride 106 98 - 107 mmol/L    CO2 25.0 22.0 - 29.0 mmol/L    Calcium 9.7 8.6 - 10.5 mg/dL    Total Protein 7.2 6.0 - 8.5 g/dL    Albumin 4.60 3.50 - 5.20 g/dL    ALT (SGPT) 21 1 - 33 U/L    AST (SGOT) 25 1 - 32 U/L    Alkaline Phosphatase 95 39 - 117 U/L    Total Bilirubin 0.6 0.0 - 1.2 mg/dL    Globulin 2.6 gm/dL    A/G Ratio 1.8 g/dL    BUN/Creatinine Ratio 22.5 7.0 - 25.0    Anion Gap 10.0 5.0 - 15.0 mmol/L    eGFR 74.8 >60.0 mL/min/1.73   Troponin    Collection Time: 12/08/22  3:53 PM    Specimen: Blood   Result Value Ref Range    Troponin T <0.010 0.000 - 0.030 ng/mL   Green Top (Gel)    Collection Time: 12/08/22  3:53 PM   Result Value Ref Range    Extra Tube Hold for add-ons.    Lavender Top    Collection Time: 12/08/22  3:53 PM   Result Value Ref Range    Extra Tube hold for add-on    Gold Top - SST    Collection Time: 12/08/22  3:53 PM   Result Value Ref Range    Extra Tube Hold for add-ons.    Light Blue Top    Collection Time: 12/08/22  3:53 PM   Result Value Ref Range    Extra Tube Hold for add-ons.    CBC Auto Differential    Collection Time: 12/08/22  3:53 PM    Specimen: Blood   Result Value Ref Range    WBC 9.78 3.40 - 10.80 10*3/mm3    RBC 4.70 3.77 - 5.28 10*6/mm3    Hemoglobin 14.5 12.0 - 15.9 g/dL    Hematocrit 43.4 34.0 - 46.6 %    MCV 92.3 79.0 - 97.0 fL    MCH 30.9 26.6 - 33.0 pg    MCHC 33.4 31.5 - 35.7 g/dL    RDW 11.9 (L) 12.3 - 15.4 %    RDW-SD 40.6 37.0 - 54.0 fl    MPV 10.5 6.0 - 12.0 fL    Platelets 197 140 - 450 10*3/mm3    Neutrophil % 70.0 42.7 - 76.0 %    Lymphocyte % 19.3 (L) 19.6 - 45.3 %    Monocyte % 5.8 5.0 - 12.0 %    Eosinophil % 3.7 0.3 - 6.2 %    Basophil % 0.8 0.0 - 1.5 %    Immature Grans % 0.4 0.0 - 0.5 %    Neutrophils, Absolute 6.84 1.70 - 7.00 10*3/mm3    Lymphocytes, Absolute 1.89 0.70 - 3.10 10*3/mm3    Monocytes, Absolute 0.57 0.10 - 0.90 10*3/mm3    Eosinophils, Absolute 0.36 0.00 - 0.40  10*3/mm3    Basophils, Absolute 0.08 0.00 - 0.20 10*3/mm3    Immature Grans, Absolute 0.04 0.00 - 0.05 10*3/mm3    nRBC 0.0 0.0 - 0.2 /100 WBC   Troponin    Collection Time: 12/08/22  6:05 PM    Specimen: Blood   Result Value Ref Range    Troponin T <0.010 0.000 - 0.030 ng/mL       Ordered the above labs and independently reviewed the results.        RADIOLOGY  XR Chest 2 View    Result Date: 12/8/2022  PA AND LATERAL CHEST  HISTORY: Left-sided chest pain  COMPARISON: 11/26/2022  FINDINGS: There is new small area of atelectasis or infiltrate within the left lung base. Heart size stable. No pneumothorax. Tortuous aorta. Visualized osseous structures are unremarkable.      New small amount of atelectasis or infiltrate within the left lung base  This report was finalized on 12/8/2022 4:16 PM by Dr. Rik Gavin M.D.      CT Angiogram Chest    Result Date: 12/8/2022  CT ANGIOGRAM CHEST  HISTORY: Worsening left neck and shoulder pain as well as chest pain since last night.  TECHNIQUE: CT angiogram chest performed using 95 mL Isovue-370 IV contrast. Multiplanar and three-dimensional reformatted images produced at a separate workstation. Correlation with CT angiogram chest February 17, 2022.  Radiation dose reduction techniques were utilized, including automated exposure control and exposure modulation based on body size.  FINDINGS: The thoracic aorta is normal in caliber and enhances normally. Pulmonary arteries are normal in caliber and enhance normally. No pulmonary embolism.  No pleural or pericardial effusion or lymphadenopathy. No evidence of pneumonia. Central airways are patent. Visualized upper abdominal solid organs appear normal. There is colonic diverticulosis and a large duodenal diverticulum without evidence of diverticulitis. The gallbladder has a normal CT appearance.  Old L1 superior plate compression fracture.      No acute or significant abnormality identified. There is an old L1 compression fracture  which appears unchanged.         I ordered the above noted radiological studies. Reviewed by me and discussed with radiologist.  See dictation for official radiology interpretation.      MEDICATIONS GIVEN IN ER    Medications   sodium chloride 0.9 % flush 10 mL (has no administration in time range)   aspirin tablet 325 mg (325 mg Oral Not Given 12/8/22 1824)   ketorolac (TORADOL) injection 15 mg (15 mg Intravenous Given 12/8/22 1816)   HYDROmorphone (DILAUDID) injection 0.5 mg (0.5 mg Intravenous Given 12/8/22 1816)   ondansetron (ZOFRAN) injection 4 mg (4 mg Intravenous Given 12/8/22 1816)   iopamidol (ISOVUE-370) 76 % injection 95 mL (95 mL Intravenous Given 12/8/22 1826)         PROGRESS, DATA ANALYSIS, CONSULTS, AND MEDICAL DECISION MAKING    All labs have been independently reviewed by me.  All radiology studies have been reviewed by me and discussed with radiologist dictating the report.   EKG's independently viewed and interpreted by me.  Discussion below represents my analysis of pertinent findings related to patient's condition, differential diagnosis, treatment plan and final disposition.    I have discussed case with Dr. Guerra, emergency room physician.  He has performed his own bedside examination and agrees with treatment plan.    ED Course as of 12/08/22 1942   Thu Dec 08, 2022   1802 Patient presents with 2-day history of atraumatic left chest wall pain.  Differential diagnoses include but not limited to pleurisy, muscular strain, ACS, aortic dissection, pulmonary embolism. [EE]   1802 Troponin T: <0.010 [EE]   1802 Creatinine: 0.89 [EE]   1802 WBC: 9.78 [EE]   1802 EKG interpreted myself.  Time 1534.  Sinus bradycardia, rate 56.  Normal P/PALOMA.  QRS shows LVH with repolarization abnormalities.  EKG unchanged since prior EKG from 11/26/2022. [EE]   1809 Chest x-ray interpreted myself shows left basilar atelectasis. [EE]   1809 Given patient's history of PE we will obtain a CTA to further rule out PE,  aortic dissection. [EE]   1842 Troponin T: <0.010 [EE]   1843 HEART Score 3: +1 age, +2 RFS [EE]   1939 Shared decision making with patient.  She certainly does have risk factors for heart disease with previous history of CVA, hypertension.  Discussed admission for stress testing versus outpatient follow-up.  She would be amenable to coming in the hospital for observation.    I discussed this with Ricardo, nurse practitioner in the observation unit.  He agrees to admit. [EE]      ED Course User Index  [EE] Mark Ly PA       AS OF 19:42 EST VITALS:    BP - 145/79  HR - (!) 48  TEMP - 98.2 °F (36.8 °C) (Tympanic)  O2 SATS - 94%        DIAGNOSIS  Final diagnoses:   Chest pain, unspecified type   History of CVA (cerebrovascular accident)   Primary hypertension   Hypercholesteremia         DISPOSITION  Admitted      Dictated utilizing Dragon dictation     Mark Ly PA  12/08/22 1943

## 2022-12-09 ENCOUNTER — APPOINTMENT (OUTPATIENT)
Dept: CARDIOLOGY | Facility: HOSPITAL | Age: 57
End: 2022-12-09

## 2022-12-09 VITALS
HEIGHT: 66 IN | WEIGHT: 158 LBS | RESPIRATION RATE: 16 BRPM | BODY MASS INDEX: 25.39 KG/M2 | SYSTOLIC BLOOD PRESSURE: 147 MMHG | TEMPERATURE: 97.7 F | DIASTOLIC BLOOD PRESSURE: 82 MMHG | HEART RATE: 49 BPM | OXYGEN SATURATION: 96 %

## 2022-12-09 LAB
ANION GAP SERPL CALCULATED.3IONS-SCNC: 10.6 MMOL/L (ref 5–15)
BH CV STRESS BP STAGE 1: NORMAL
BH CV STRESS BP STAGE 2: NORMAL
BH CV STRESS BP STAGE 3: NORMAL
BH CV STRESS DURATION MIN STAGE 1: 3
BH CV STRESS DURATION MIN STAGE 2: 3
BH CV STRESS DURATION MIN STAGE 3: 1
BH CV STRESS DURATION SEC STAGE 1: 0
BH CV STRESS DURATION SEC STAGE 2: 0
BH CV STRESS DURATION SEC STAGE 3: 4
BH CV STRESS GRADE STAGE 1: 10
BH CV STRESS GRADE STAGE 2: 12
BH CV STRESS GRADE STAGE 3: 14
BH CV STRESS HR STAGE 1: 75
BH CV STRESS HR STAGE 2: 103
BH CV STRESS HR STAGE 3: 117
BH CV STRESS METS STAGE 1: 5
BH CV STRESS METS STAGE 2: 7.5
BH CV STRESS METS STAGE 3: 10
BH CV STRESS PROTOCOL 1: NORMAL
BH CV STRESS RECOVERY BP: NORMAL MMHG
BH CV STRESS RECOVERY HR: 52 BPM
BH CV STRESS SPEED STAGE 1: 1.7
BH CV STRESS SPEED STAGE 2: 2.5
BH CV STRESS SPEED STAGE 3: 3.4
BH CV STRESS STAGE 1: 1
BH CV STRESS STAGE 2: 2
BH CV STRESS STAGE 3: 3
BUN SERPL-MCNC: 23 MG/DL (ref 6–20)
BUN/CREAT SERPL: 28.4 (ref 7–25)
CALCIUM SPEC-SCNC: 8.9 MG/DL (ref 8.6–10.5)
CHLORIDE SERPL-SCNC: 107 MMOL/L (ref 98–107)
CO2 SERPL-SCNC: 23.4 MMOL/L (ref 22–29)
CREAT SERPL-MCNC: 0.81 MG/DL (ref 0.57–1)
DEPRECATED RDW RBC AUTO: 39 FL (ref 37–54)
EGFRCR SERPLBLD CKD-EPI 2021: 83.7 ML/MIN/1.73
ERYTHROCYTE [DISTWIDTH] IN BLOOD BY AUTOMATED COUNT: 11.8 % (ref 12.3–15.4)
GLUCOSE SERPL-MCNC: 98 MG/DL (ref 65–99)
HCT VFR BLD AUTO: 39.8 % (ref 34–46.6)
HGB BLD-MCNC: 13.5 G/DL (ref 12–15.9)
MAGNESIUM SERPL-MCNC: 2.1 MG/DL (ref 1.6–2.6)
MAXIMAL PREDICTED HEART RATE: 161 BPM
MCH RBC QN AUTO: 30.9 PG (ref 26.6–33)
MCHC RBC AUTO-ENTMCNC: 33.9 G/DL (ref 31.5–35.7)
MCV RBC AUTO: 91.1 FL (ref 79–97)
PERCENT MAX PREDICTED HR: 75.16 %
PLATELET # BLD AUTO: 162 10*3/MM3 (ref 140–450)
PMV BLD AUTO: 10.8 FL (ref 6–12)
POTASSIUM SERPL-SCNC: 3.6 MMOL/L (ref 3.5–5.2)
RBC # BLD AUTO: 4.37 10*6/MM3 (ref 3.77–5.28)
SODIUM SERPL-SCNC: 141 MMOL/L (ref 136–145)
STRESS BASELINE BP: NORMAL MMHG
STRESS BASELINE HR: 46 BPM
STRESS PERCENT HR: 88 %
STRESS POST ESTIMATED WORKLOAD: 8.7 METS
STRESS POST EXERCISE DUR MIN: 7 MIN
STRESS POST EXERCISE DUR SEC: 4 SEC
STRESS POST PEAK BP: NORMAL MMHG
STRESS POST PEAK HR: 121 BPM
STRESS TARGET HR: 137 BPM
TROPONIN T SERPL-MCNC: <0.01 NG/ML (ref 0–0.03)
WBC NRBC COR # BLD: 6.37 10*3/MM3 (ref 3.4–10.8)

## 2022-12-09 PROCEDURE — 83735 ASSAY OF MAGNESIUM: CPT | Performed by: NURSE PRACTITIONER

## 2022-12-09 PROCEDURE — 84484 ASSAY OF TROPONIN QUANT: CPT | Performed by: NURSE PRACTITIONER

## 2022-12-09 PROCEDURE — 93018 CV STRESS TEST I&R ONLY: CPT | Performed by: INTERNAL MEDICINE

## 2022-12-09 PROCEDURE — 93005 ELECTROCARDIOGRAM TRACING: CPT | Performed by: NURSE PRACTITIONER

## 2022-12-09 PROCEDURE — 85027 COMPLETE CBC AUTOMATED: CPT | Performed by: NURSE PRACTITIONER

## 2022-12-09 PROCEDURE — G0378 HOSPITAL OBSERVATION PER HR: HCPCS

## 2022-12-09 PROCEDURE — 80048 BASIC METABOLIC PNL TOTAL CA: CPT | Performed by: NURSE PRACTITIONER

## 2022-12-09 PROCEDURE — 93016 CV STRESS TEST SUPVJ ONLY: CPT | Performed by: INTERNAL MEDICINE

## 2022-12-09 PROCEDURE — 99214 OFFICE O/P EST MOD 30 MIN: CPT | Performed by: INTERNAL MEDICINE

## 2022-12-09 PROCEDURE — 93017 CV STRESS TEST TRACING ONLY: CPT

## 2022-12-09 RX ORDER — CLONIDINE HYDROCHLORIDE 0.1 MG/1
0.1 TABLET ORAL DAILY PRN
Qty: 30 TABLET | Refills: 0 | Status: SHIPPED | OUTPATIENT
Start: 2022-12-09

## 2022-12-09 RX ORDER — HYDROCHLOROTHIAZIDE 25 MG/1
25 TABLET ORAL DAILY
Status: DISCONTINUED | OUTPATIENT
Start: 2022-12-09 | End: 2022-12-09 | Stop reason: HOSPADM

## 2022-12-09 RX ORDER — HYDROCODONE BITARTRATE AND ACETAMINOPHEN 5; 325 MG/1; MG/1
1 TABLET ORAL ONCE
Status: COMPLETED | OUTPATIENT
Start: 2022-12-09 | End: 2022-12-09

## 2022-12-09 RX ORDER — HYDROCHLOROTHIAZIDE 25 MG/1
25 TABLET ORAL DAILY
Qty: 30 TABLET | Refills: 11 | Status: SHIPPED | OUTPATIENT
Start: 2022-12-09 | End: 2022-12-29 | Stop reason: SDUPTHER

## 2022-12-09 RX ORDER — CLONIDINE HYDROCHLORIDE 0.1 MG/1
0.1 TABLET ORAL DAILY PRN
Status: CANCELLED | OUTPATIENT
Start: 2022-12-09

## 2022-12-09 RX ADMIN — HYDROCODONE BITARTRATE AND ACETAMINOPHEN 1 TABLET: 5; 325 TABLET ORAL at 05:11

## 2022-12-09 RX ADMIN — HYDROCHLOROTHIAZIDE 25 MG: 25 TABLET ORAL at 14:51

## 2022-12-09 NOTE — H&P
.   [facility]   HISTORY AND PHYSICAL    Patient Name: Emy Medina  : 1965  MRN: [id number]  Primary Care Physician:  Clem Bush MD  Date of admission: 2022    Subjective   Subjective     Chief Complaint: Chest pain    HPI:    Emy Medina is a 57 y.o. female with past medical history including but not limited to arthritis, stroke, hypertension, PE, and pleurisy presents to [facility] with chest pain since yesterday evening.  Patient reports she started having left shoulder  pain yesterday evening that has progressively worsened since then.  Patient reports pain radiates into her left upper chest that she describes as sharp, constant and stabbing.  Symptoms are made worse with deep breathing and leaning forward.  Nothing alleviates symptoms.  Denies any associated nausea, vomiting, diaphoresis or back pain.  Patient denies any previous cardiac history.  Patient denies abdominal pain, vomiting, or diarrhea.  Denies cough, chills, fever, or lower extremities edema.  Denies dysuria, hematuria, or increased urinary frequency/urgency.    Laboratory evaluation at the ED relatively unremarkable.  Chest x-ray shows small amount of atelectasis within the left lung base.  EKG shows sinus rhythm with a rate of 56 otherwise negative for ischemic changes    Review of Systems   All systems were reviewed and negative except for: The mentioned above in HPI    Personal History     Past Medical History:   Diagnosis Date   • Arthritis    • Hypertension    • PE (pulmonary embolism)    • Pleurisy        Past Surgical History:   Procedure Laterality Date   • CATARACT EXTRACTION     • HYSTERECTOMY     • LASIK     • SINUS SURGERY         Family History: family history includes Hypertension in her mother; Multiple sclerosis in her father; Stroke in her mother. Otherwise pertinent FHx was reviewed and not pertinent to current issue.    Social History:  reports that she has never smoked. She has  never used smokeless tobacco. She reports that she does not drink alcohol and does not use drugs.    Home Medications:  Calcium Carbonate-Vit D-Min, Vitamin D3, aspirin, atorvastatin, carvedilol, gabapentin, hydrALAZINE, and valsartan    Allergies:  Allergies   Allergen Reactions   • Doxycycline    • Sulfa Antibiotics Hives       Objective   Objective     Vitals:   Temp:  [98.2 °F (36.8 °C)] 98.2 °F (36.8 °C)  Heart Rate:  [48-68] 52  Resp:  [18-22] 18  BP: (140-187)/(79-98) 171/92  Physical Exam    Constitutional: Awake, alert, in no acute distress   Eyes: PERRLA, sclerae anicteric, no conjunctival injection   HENT: NCAT, mucous membranes moist   Neck: Supple, no thyromegaly, no lymphadenopathy, trachea midline   Respiratory: Clear to auscultation bilaterally, nonlabored respirations    Cardiovascular: RRR, S1 and S2, no S3 or S4, no murmurs, rubs, or gallops, palpable pedal pulses bilaterally   Gastrointestinal: Positive bowel sounds, soft, nontender, nondistended   Musculoskeletal: No bilateral ankle edema, no clubbing or cyanosis to extremities   Psychiatric: Appropriate affect, cooperative   Neurologic: Oriented x 3, strength symmetric in all extremities, Cranial Nerves grossly intact to confrontation, speech clear   Skin: No rashes     Result Review    Result Review:  I have personally reviewed the results from the time of this admission to 12/8/2022 20:27 EST and agree with these findings:  []  Laboratory list / accordion  []  Microbiology  []  Radiology  []  EKG/Telemetry   []  Cardiology/Vascular   []  Pathology  []  Old records  []    Most notable findings include:     Assessment & Plan   Assessment / Plan     Brief Patient Summary:  Emy Medina is a 57 y.o. female who was seen and evaluated the ED for left-sided upper chest pain.  Laboratory evaluation in the ED relatively unremarkable with the normal EKG which leads me to believe that its patient's symptoms are more of a pleurisy and cardiac  related.    Active Hospital Problems:  Active Hospital Problems    Diagnosis    • **Chest pain      Plan:   Chest pain  -Most likely pleurisy  -Initial and repeat troponin negative, trend  -Cardiology consult  -EKG nonischemic  -Chest x-ray shows atelectasis within the left lung base  -Cardiac monitoring  -N.p.o. after midnight  -Echo on 8/20/2022 shows LVEF 62% and grade 1 LV diastolic function    Hyperlipidemia and hypertension  -Continue home medications  -Lipid panel pending  -Vital signs per nursing     DVT prophylaxis:  Mechanical DVT prophylaxis orders are present.    CODE STATUS:    Level Of Support Discussed With: Patient  Code Status (Patient has no pulse and is not breathing): CPR (Attempt to Resuscitate)  Medical Interventions (Patient has pulse or is breathing): Full Support    Admission Status:  I believe this patient meets observation status.    .I wore an face mask, eye protection, and gloves during this patient encounter. Patient also wearing a surgical mask. Hand hygeine performed before and after seeing the patient.    Electronically signed by ELA Unger, 12/08/22, 8:27 PM EST.

## 2022-12-09 NOTE — CASE MANAGEMENT/SOCIAL WORK
Discharge Planning Assessment  Psychiatric     Patient Name: Emy Medina  MRN: [id number]  Today's Date: 12/9/2022    Admit Date: 12/8/2022        Discharge Needs Assessment     Row Name 12/09/22 1353       Living Environment    People in Home spouse    Name(s) of People in Home Samuel Medina- spouse    Current Living Arrangements home    Primary Care Provided by self    Provides Primary Care For no one    Family Caregiver if Needed spouse    Quality of Family Relationships involved;helpful;supportive    Able to Return to Prior Arrangements yes       Resource/Environmental Concerns    Resource/Environmental Concerns none       Transition Planning    Patient/Family Anticipates Transition to home with family    Patient/Family Anticipated Services at Transition none    Transportation Anticipated family or friend will provide       Discharge Needs Assessment    Equipment Currently Used at Home bp cuff;cane, quad    Concerns to be Addressed denies needs/concerns at this time    Anticipated Changes Related to Illness none    Equipment Needed After Discharge none    Provided Post Acute Provider List? N/A               Discharge Plan     Row Name 12/09/22 0187       Plan    Plan Comments Entered room, introduced self and explained role w/PPE in place on self and patients spouse at bedside; patient has removed mask; verified information on facesheet; received permission to speak in front of spouse; patient is independent w/ADL's, works full time, still drives; RX are filled at [facility] on [address] presented to the ED w/chest pain; denies any d/c needs at this time- plans to return home upon d/c w/spouse to transport.              Continued Care and Services - Admitted Since 12/8/2022    Coordination has not been started for this encounter.          Demographic Summary     Row Name 12/09/22 1352       General Information    Admission Type observation    Arrived From home    Reason for Consult discharge  planning;decision-making    Preferred Language English       Contact Information    Permission Granted to Share Info With                Functional Status     Row Name 12/09/22 0854       Functional Status    Usual Activity Tolerance moderate    Current Activity Tolerance moderate       Assessment of Health Literacy    How often do you have someone help you read hospital materials? Never    How often do you have problems learning about your medical condition because of difficulty understanding written information? Never    How often do you have a problem understanding what is told to you about your medical condition? Never    How confident are you filling out medical forms by yourself? Extremely    Health Literacy Good       Functional Status, IADL    Medications independent    Meal Preparation independent    Housekeeping independent    Laundry independent    Shopping independent       Mental Status    General Appearance WDL WDL       Mental Status Summary    Recent Changes in Mental Status/Cognitive Functioning no changes       Employment/    Employment Status employed full-time               Psychosocial    No documentation.                Abuse/Neglect    No documentation.                Legal    No documentation.                Substance Abuse    No documentation.                Patient Forms    No documentation.                   Nicole Ng RN

## 2022-12-09 NOTE — CONSULTS
Patient Name: Emy Medina  :1965  57 y.o.    Date of Admission: 2022  Date of Consultation:  22  Encounter Provider: Eunice Ramey RN  Place of Service: [facility] CARDIOLOGY  Referring Provider: No ref. provider found  Patient Care Team:  Clem Bush MD as PCP - General  Clem Bush MD as PCP - Family Medicine      Chief complaint:    History of Present Illness: Ms Medina is a 57 year old male patient mine with history of hypertension, prior PE in  following a hysterectomy and recent CVA in August.   She has difficult to control BP. We have been working closely on this for the last several months.  She was in the ER in November with asymptomatic severe hypertension.  None of her medications were changed by the ER and on review of her blood pressure log which she has with her today and is very meticulous, her pressures range from 110 up to 180.  Yesterday she had pleuritic ongoing pain with a sharp component and a pressure component from her left arm into her neck and left chest.  It is improved this morning.  Blood pressures improved this morning.    CXR noted a new small left-sided atelectasis.     Serial troponins negative. Negative d-dimer.    CTA chest only notable for an old L1 compression fracture.      She had a recent echocardiogram obtained in August demonstrating normal LV and EF 62%, with a grade I diastolic dysfunction.     Previous Cardiac Testing:    Echocardiogram 22  • Calculated left ventricular EF = 62.1% Estimated left ventricular EF was in agreement with the calculated left ventricular EF. Left ventricular systolic function is normal. Normal left ventricular cavity size noted. Left ventricular wall thickness is consistent with moderate basal asymmetric hypertrophy. All left ventricular wall segments contract normally. Left ventricular diastolic function is consistent with (grade I) impaired relaxation.  • The  left atrial cavity is mildly dilated. Saline test results are negative.        Past Medical History:   Diagnosis Date   • Arthritis    • Hypertension    • PE (pulmonary embolism)    • Pleurisy        Past Surgical History:   Procedure Laterality Date   • CATARACT EXTRACTION     • HYSTERECTOMY     • LASIK     • SINUS SURGERY           Prior to Admission medications    Medication Sig Start Date End Date Taking? Authorizing Provider   aspirin 81 MG chewable tablet Chew 1 tablet Daily. 8/5/22  Yes Flavio Ramírez MD   atorvastatin (LIPITOR) 40 MG tablet Take 1 tablet by mouth Every Night. 8/4/22  Yes Flavio Ramírez MD   Calcium Carbonate-Vit D-Min (CALCIUM 1200 PO) Take  by mouth.   Yes ProviderMike MD   carvedilol (COREG) 12.5 MG tablet Take 1 tablet by mouth 2 (Two) Times a Day. 11/14/22  Yes Leighann Echols MD   gabapentin (NEURONTIN) 100 MG capsule 100mg in am and 100mg mid-day and 200mg at night 11/14/22  Yes Samantha Cardenas APRN   hydrALAZINE (APRESOLINE) 25 MG tablet Take 1 tablet by mouth 3 (Three) Times a Day. 11/29/22  Yes Leighann Echols MD   valsartan (DIOVAN) 160 MG tablet Take 1 tablet by mouth 2 (Two) Times a Day. 8/19/22  Yes Leighann Echols MD   Cholecalciferol (Vitamin D3) 10 MCG (400 UNIT) capsule Daily.    ProviderMike MD       Allergies   Allergen Reactions   • Doxycycline    • Sulfa Antibiotics Hives       Social History     Socioeconomic History   • Marital status:    Tobacco Use   • Smoking status: Never   • Smokeless tobacco: Never   Vaping Use   • Vaping Use: Never used   Substance and Sexual Activity   • Alcohol use: No   • Drug use: No   • Sexual activity: Yes     Partners: Male       Family History   Problem Relation Age of Onset   • Stroke Mother    • Hypertension Mother    • Multiple sclerosis Father        REVIEW OF SYSTEMS:   All systems reviewed.  Pertinent positives identified in HPI.  All other systems are negative.      Objective:     Vitals:     "12/08/22 2018 12/09/22 0004 12/09/22 0242 12/09/22 0718   BP: 171/92 112/66 132/78 136/78   BP Location: Right arm Right arm Right arm Right arm   Patient Position: Sitting Lying Lying Lying   Pulse: 52 (!) 47 (!) 49 (!) 47   Resp: 18 18 18 16   Temp: 98.2 °F (36.8 °C) 97.6 °F (36.4 °C) 98.1 °F (36.7 °C) 97.7 °F (36.5 °C)   TempSrc: Oral Oral Oral Oral   SpO2: 97% 98% 97% 98%   Weight: 71.7 kg (158 lb)      Height: 167.6 cm (66\")        Body mass index is 25.5 kg/m².    General Appearance:    Alert, cooperative, in no acute distress.  Comfortable   Head:    Normocephalic, without obvious abnormality, atraumatic   Eyes:            Lids and lashes normal, conjunctivae and sclerae normal, no icterus, no pallor, corneas clear, PERRLA   Ears:    Ears appear intact with no abnormalities noted   Throat:   No oral lesions, no thrush, oral mucosa moist   Neck:   No adenopathy, supple, trachea midline, no thyromegaly, no carotid bruit, no JVD   Back:     No kyphosis present, no scoliosis present, no skin lesions, erythema or scars, no tenderness to percussion or palpation, range of motion normal   Lungs:     Clear to auscultation, respirations regular, even and unlabored    Heart:    Regular rhythm and normal rate, normal S1 and S2, no murmur, no gallop, no rub, no click   Chest Wall:    No abnormalities observed   Abdomen:     Normal bowel sounds, no masses, no organomegaly, soft, nontender, nondistended, no guarding, no rebound  tenderness   Extremities:   Moves all extremities well, no edema, no cyanosis, no redness   Pulses:   Pulses palpable and equal bilaterally. Normal radial, carotid, femoral, dorsalis pedis and posterior tibial pulses bilaterally. Normal abdominal aorta   Skin:  Psychiatric:   No bleeding, bruising or rash    Alert and oriented x 3, normal mood and affect   Lab Review:     Results from last 7 days   Lab Units 12/09/22  0510 12/08/22  1553   SODIUM mmol/L 141 141   POTASSIUM mmol/L 3.6 4.1   CHLORIDE " mmol/L 107 106   CO2 mmol/L 23.4 25.0   BUN mg/dL 23* 20   CREATININE mg/dL 0.81 0.89   CALCIUM mg/dL 8.9 9.7   BILIRUBIN mg/dL  --  0.6   ALK PHOS U/L  --  95   ALT (SGPT) U/L  --  21   AST (SGOT) U/L  --  25   GLUCOSE mg/dL 98 104*     Results from last 7 days   Lab Units 12/09/22  0510 12/08/22  1805 12/08/22  1553   TROPONIN T ng/mL <0.010 <0.010 <0.010     Results from last 7 days   Lab Units 12/09/22  0510   WBC 10*3/mm3 6.37   HEMOGLOBIN g/dL 13.5   HEMATOCRIT % 39.8   PLATELETS 10*3/mm3 162         Results from last 7 days   Lab Units 12/09/22  0510   MAGNESIUM mg/dL 2.1     Results from last 7 days   Lab Units 12/08/22  1805   CHOLESTEROL mg/dL 89   TRIGLYCERIDES mg/dL 82   HDL CHOL mg/dL 35*   LDL CHOL mg/dL 37                EKG 12/9/22    Admit EKG 12/8/22    Previous EKG 11/26/22        I personally viewed and interpreted the patient's EKG/Telemetry data.        Assessment and Plan:      1.  Difficult to control hypertension: Continue home medications.  Add diuretic.  Will likely need as needed clonidine at home 0.1 for pressures greater than 200  2.  Chest pain: Given her history of stroke in hypertension we will proceed with a walking nuclear stress test today.  Hopefully this will be reassuring to her.  If this is normal then she can be discharged with close follow-up with me in the office in 1 month    Leighann Echols MD  [facility]  12/09/22

## 2022-12-09 NOTE — PLAN OF CARE
Goal Outcome Evaluation:   Pt admitted from ED for chest pain x 2 days. Sinus bradycardia on monitor. Reports improvement in chest pain since medicated in ED.

## 2022-12-09 NOTE — DISCHARGE SUMMARY
ED OBSERVATION PROGRESS/DISCHARGE SUMMARY    Date of Admission: 12/8/2022   LOS: 0 days   PCP: Clem Bush MD      Subjective    No acute events overnight.  Patient denies any chest pain or chest discomfort at this time.  Denies shortness of breath.  She denies any fevers or chills.  Denies abdominal pain and nausea.  She states that this pain had initially started out of the back moved to the shoulder down the arm and into the chest.  States the pain has resolved at this time.    Hospital Outcome:   57-year-old female admitted to the observation unit for further evaluation of chest pain and left shoulder pain.  Serial troponins remain negative and EKG with no acute ischemic changes.  A chest x-ray revealed a small amount of atelectasis within the left lung base.  D-dimer was negative. CTA of the chest was still performed that revealed no acute or significant abnormality with an old L1 compression fracture that appears unchanged.  Cardiology saw and evaluated the patient recommending treadmill stress test for further evaluation, stress test revealed no new ST changes noted above baseline, patient did only reached 75% of her age-predicted maximum heart rate and had to stop due to fatigue and dyspnea but denied any sort of chest pain.  Dr. Cotto added hydrochlorothiazide 25 mg daily to the patient's antihypertensive regimen and prescribe clonidine 0.1 mg tablets daily as needed for persistent blood pressures greater than 200/100.  Patient will follow-up with Dr. Cotto in the office on 12/29/2022.  I discussed all of the findings and plan for discharge with the patient who endorses understanding is in agreement.    ROS:  General: no fevers, chills  Respiratory: no cough, dyspnea  Cardiovascular: no chest pain, palpitations  Abdomen: No abdominal pain, nausea, vomiting, or diarrhea  Neurologic: No focal weakness    Objective   Physical Exam:  I have reviewed the vital signs.  Temp:  [97.6 °F (36.4 °C)-98.2 °F  (36.8 °C)] 97.7 °F (36.5 °C)  Heart Rate:  [47-68] 49  Resp:  [16-22] 16  BP: (112-187)/(66-98) 147/82  General Appearance:  57-year-old female, well-nourished, no acute distress on room air  Head:    Normocephalic, atraumatic  Eyes:    Sclerae anicteric  Neck:   Supple, no mass  Lungs: Clear to auscultation bilaterally, respirations unlabored  Heart: Regular rate and rhythm, S1 and S2 normal, no murmur, rub or gallop, no chest wall tenderness  Abdomen:  Soft, non-tender, bowel sounds active, nondistended  Extremities: No clubbing, cyanosis, or edema to lower extremities  Pulses:  2+ and symmetric in distal lower extremities  Skin: No rashes   Neurologic: Oriented x3, Normal strength to extremities    Results Review:    I have reviewed the labs, radiology results and diagnostic studies.    Results from last 7 days   Lab Units 12/09/22  0510   WBC 10*3/mm3 6.37   HEMOGLOBIN g/dL 13.5   HEMATOCRIT % 39.8   PLATELETS 10*3/mm3 162     Results from last 7 days   Lab Units 12/09/22  0510 12/08/22  1553   SODIUM mmol/L 141 141   POTASSIUM mmol/L 3.6 4.1   CHLORIDE mmol/L 107 106   CO2 mmol/L 23.4 25.0   BUN mg/dL 23* 20   CREATININE mg/dL 0.81 0.89   CALCIUM mg/dL 8.9 9.7   BILIRUBIN mg/dL  --  0.6   ALK PHOS U/L  --  95   ALT (SGPT) U/L  --  21   AST (SGOT) U/L  --  25   GLUCOSE mg/dL 98 104*     Imaging Results (Last 24 Hours)     Procedure Component Value Units Date/Time    CT Angiogram Chest [[id number]] Collected: 12/08/22 1929     Updated: 12/08/22 2012    Narrative:      CT ANGIOGRAM CHEST     HISTORY: Worsening left neck and shoulder pain as well as chest pain  since last night.     TECHNIQUE: CT angiogram chest performed using 95 mL Isovue-370 IV  contrast. Multiplanar and three-dimensional reformatted images produced  at a separate workstation. Correlation with CT angiogram chest February 17, 2022.     Radiation dose reduction techniques were utilized, including automated  exposure control and exposure modulation  based on body size.     FINDINGS: The thoracic aorta is normal in caliber and enhances normally.  Pulmonary arteries are normal in caliber and enhance normally. No  pulmonary embolism.     No pleural or pericardial effusion or lymphadenopathy. No evidence of  pneumonia. Central airways are patent. Visualized upper abdominal solid  organs appear normal. There is colonic diverticulosis and a large  duodenal diverticulum without evidence of diverticulitis. The  gallbladder has a normal CT appearance.     Old L1 superior plate compression fracture.       Impression:      No acute or significant abnormality identified. There is an  old L1 compression fracture which appears unchanged.     This report was finalized on 12/8/2022 8:08 PM by Dr. Kristopher Harmon M.D.       XR Chest 2 View [[id number]] Collected: 12/08/22 1615     Updated: 12/08/22 1619    Narrative:      PA AND LATERAL CHEST     HISTORY: Left-sided chest pain     COMPARISON: 11/26/2022     FINDINGS: There is new small area of atelectasis or infiltrate within  the left lung base. Heart size stable. No pneumothorax. Tortuous aorta.  Visualized osseous structures are unremarkable.       Impression:      New small amount of atelectasis or infiltrate within the left lung base     This report was finalized on 12/8/2022 4:16 PM by Dr. Rik Gavin M.D.             I have reviewed the medications.  ---------------------------------------------------------------------------------------------  Assessment & Plan   Assessment/Problem List    Chest pain      Plan:    Chest pain  -Serial troponins negative  -EKG nonischemic  -CTA of the chest no acute findings  -Cardiology consulted, stress test showed no ST changes, Dr. Clement states she will follow-up in the office on 12/29/2022  -Start hydrochlorothiazide 25 mg daily and patient prescribed clonidine 0.1 mg daily as needed for persistent elevated blood pressures > 200/100  -Echo on 8/20/2022 shows LVEF 62% and grade 1  LV diastolic function    Hyperlipidemia and hypertension  -Continue home medications    Disposition: Home    Follow-up after Discharge: Cardiology, PCP    This note will serve as discharge summary.    I wore an face mask, eye protection, and gloves during this patient encounter. Patient also wearing a surgical mask. Hand hygeine performed before and after seeing the patient.      Helen Gannon PA-C 12/09/22 14:50 EST

## 2022-12-09 NOTE — DISCHARGE INSTRUCTIONS
Start taking hydrochlorothiazide 25 mg daily.  You have also been prescribed clonidine 0.1 mg tablets to take once daily as needed for persistent elevated blood pressures greater than 200/100.  You have a follow-up appointment with Dr. Echols on 12/29/2022 at 12:30 PM, please keep this appointment.  Follow-up with your primary care doctor in 1 to 2 weeks.  Continue monitoring your blood pressures 1-2 times daily.    Return to the emergency department with worsening symptoms, uncontrolled pain, inability to tolerate oral liquids, fever greater than 101°F not controlled by Tylenol or as needed with emergent concerns.

## 2022-12-09 NOTE — PROGRESS NOTES
MD Attestation Note    I supervised care provided by the midlevel provider.    The ASHLEIGH and I have discussed this patient's history, physical exam, and treatment plan. I have reviewed the documentation and personally had a face to face interaction with the patient  I affirm the documentation and agree with the treatment and plan.       My personal findings are:        Subjective:  57-year-old female admitted to the observation unit for evaluation of chest pain.  It has been mostly sharp and sometimes pleuritic, mostly in the left arm and left side of her chest.  She reports that she just returned from the stress test and her pain was minimal and relatively constant throughout the stress test but she did notice pretty significant shortness of breath with exertion.  She does not exercise regularly.  She attributes this to being out of shape.        Objective:  Awake and alert, no acute distress  Respiratory: Normal work of breathing  CV: Regular rhythm, normal rate, no peripheral edema  GI: Abdomen nondistended  Neuro: Cranial nerves II through XII grossly intact, speech fluent and clear  Skin: Normal to inspection        Assessment/ Plan:  Chest pain  Possibly pleuritic, she does have a history of previous pulmonary embolus however vitals normal, Wells low risk, D-dimer negative in ER.  Stress test has been completed, results pending.

## 2022-12-09 NOTE — PLAN OF CARE
Problem: Adult Inpatient Plan of Care  Goal: Plan of Care Review  Outcome: Met  Goal: Patient-Specific Goal (Individualized)  Outcome: Met  Goal: Absence of Hospital-Acquired Illness or Injury  Outcome: Met  Intervention: Identify and Manage Fall Risk  Recent Flowsheet Documentation  Taken 12/9/2022 1220 by Ever Waller RN  Safety Promotion/Fall Prevention:   activity supervised   clutter free environment maintained   nonskid shoes/slippers when out of bed   safety round/check completed  Taken 12/9/2022 1000 by Ever Waller RN  Safety Promotion/Fall Prevention: patient off unit  Taken 12/9/2022 0832 by Ever Waller RN  Safety Promotion/Fall Prevention:   activity supervised   nonskid shoes/slippers when out of bed   safety round/check completed  Intervention: Prevent Skin Injury  Recent Flowsheet Documentation  Taken 12/9/2022 1220 by Ever Waller RN  Body Position: position changed independently  Taken 12/9/2022 0832 by Ever Waller RN  Body Position: position changed independently  Intervention: Prevent and Manage VTE (Venous Thromboembolism) Risk  Recent Flowsheet Documentation  Taken 12/9/2022 1220 by Ever Waller RN  Activity Management: activity adjusted per tolerance  Taken 12/9/2022 0832 by Ever Waller RN  Activity Management: activity adjusted per tolerance  Intervention: Prevent Infection  Recent Flowsheet Documentation  Taken 12/9/2022 1220 by Ever Waller RN  Infection Prevention:   single patient room provided   rest/sleep promoted  Taken 12/9/2022 1000 by Ever Waller RN  Infection Prevention:   single patient room provided   rest/sleep promoted  Goal: Optimal Comfort and Wellbeing  Outcome: Met  Intervention: Provide Person-Centered Care  Recent Flowsheet Documentation  Taken 12/9/2022 1000 by Ever Waller RN  Trust Relationship/Rapport:   care explained   emotional support provided   thoughts/feelings acknowledged   reassurance provided   questions answered   questions encouraged  Goal:  Readiness for Transition of Care  Outcome: Met     Problem: Chest Pain  Goal: Resolution of Chest Pain Symptoms  Outcome: Met     Problem: Hypertension Comorbidity  Goal: Blood Pressure in Desired Range  Outcome: Met     Problem: Osteoarthritis Comorbidity  Goal: Maintenance of Osteoarthritis Symptom Control  Outcome: Met  Intervention: Maintain Osteoarthritis Symptom Control  Recent Flowsheet Documentation  Taken 12/9/2022 1220 by Ever Waller, RN  Activity Management: activity adjusted per tolerance  Taken 12/9/2022 0832 by Ever Waller, RN  Activity Management: activity adjusted per tolerance   Goal Outcome Evaluation:

## 2022-12-11 LAB — QT INTERVAL: 484 MS

## 2022-12-21 LAB
PE IGA SER-ACNC: NORMAL
PE IGG SER-ACNC: NORMAL
PE IGM SER-ACNC: NORMAL
PG IGA SER-ACNC: NORMAL
PG IGG SER-ACNC: NORMAL
PG IGM SER-ACNC: NORMAL
PHOSPHATIDATE IGA SER-ACNC: NORMAL
PHOSPHATIDATE IGG SER-ACNC: NORMAL
PHOSPHATIDATE IGM SER-ACNC: NORMAL
PI IGA SER-ACNC: NORMAL
PI IGG SER-ACNC: NORMAL
PI IGM SER-ACNC: NORMAL

## 2022-12-29 ENCOUNTER — OFFICE VISIT (OUTPATIENT)
Dept: CARDIOLOGY | Facility: CLINIC | Age: 57
End: 2022-12-29

## 2022-12-29 VITALS
SYSTOLIC BLOOD PRESSURE: 102 MMHG | DIASTOLIC BLOOD PRESSURE: 80 MMHG | WEIGHT: 161 LBS | HEIGHT: 66 IN | BODY MASS INDEX: 25.88 KG/M2 | HEART RATE: 51 BPM

## 2022-12-29 DIAGNOSIS — I10 PRIMARY HYPERTENSION: Primary | ICD-10-CM

## 2022-12-29 PROCEDURE — 99214 OFFICE O/P EST MOD 30 MIN: CPT | Performed by: INTERNAL MEDICINE

## 2022-12-29 RX ORDER — HYDROCHLOROTHIAZIDE 25 MG/1
25 TABLET ORAL DAILY
Qty: 90 TABLET | Refills: 3 | Status: SHIPPED | OUTPATIENT
Start: 2022-12-29 | End: 2023-03-22 | Stop reason: HOSPADM

## 2022-12-29 NOTE — PROGRESS NOTES
Subjective:     Encounter Date: 12/29/22      Patient ID: Emy Medina is a 57 y.o. female.    Chief Complaint: Hypertension  HPI:   This is a 57-year-old woman with hypertension, PE, CVA.  When I met her in April 2022 she had recently been in the emergency with chest pain.  At that time she was severely hypertensive.  We had been working on her blood pressure and had actually pretty well controlled. Then in August 2022 she noted some unilateral weakness in her arm and leg.  She waited a few days but eventually presented to the emergency room.  At that time she was found to have a lacunar stroke.    In December 22 she presented to the emergency room with chest pain.  Blood pressure again was elevated.  We added hydrochlorothiazide and blood pressure is improved.  Given the ongoing pain we had her perform a treadmill stress test.  She up to 75% of her target heart rate without any EKG changes or recurrence of pain symptoms.  Today she returns.  Blood pressures been well controlled.  Occasionally her pressure drops in the 90s but is asymptomatic.  Her PCP gave her steroids for possible protruding's cervical disc which improved her chest and back pain.    She works in the cafeteria of a school.  She is .  She has never smoked.  She does not drink alcohol.  Her history of PE was back in 2016 which occurred after hysterectomy.  She was treated with anticoagulation for 6 months.  The following portions of the patient's history were reviewed and updated as appropriate: allergies, current medications, past family history, past medical history, past social history, past surgical history and problem list.     REVIEW OF SYSTEMS:   All systems reviewed.  Pertinent positives identified in HPI.  All other systems are negative.    Past Medical History:   Diagnosis Date   • Arthritis    • Hypertension    • PE (pulmonary embolism)    • Pleurisy        Family History   Problem Relation Age of Onset   • Stroke Mother    •  Hypertension Mother    • Multiple sclerosis Father        Social History     Socioeconomic History   • Marital status:    Tobacco Use   • Smoking status: Never   • Smokeless tobacco: Never   Vaping Use   • Vaping Use: Never used   Substance and Sexual Activity   • Alcohol use: No   • Drug use: No   • Sexual activity: Yes     Partners: Male       Allergies   Allergen Reactions   • Doxycycline    • Sulfa Antibiotics Hives       Past Surgical History:   Procedure Laterality Date   • CATARACT EXTRACTION     • HYSTERECTOMY     • LASIK     • SINUS SURGERY         Procedures       Objective:         PHYSICAL EXAM:  GEN: VSS, no distress,   Eyes: normal sclera, normal lids and lashes  HENT: moist mucus membranes,   Respiratory: CTAB, no rales or wheezes  CV: RRR, no murmurs, , +2 DP and 2+ carotid pulses b/l  GI: NABS, soft,  Nontender, nondistended  MSK: no edema, no scoliosis or kyphosis  Skin: no rash, warm, dry  Heme/Lymph: no bruising or bleeding  Psych: organized thought, normal behavior and affect  Neuro: Cranial nerves grossly intact, Alert and Oriented x 3.         Assessment:          Diagnosis Plan   1. Primary hypertension             Plan:       1.  Hypertension: Continue Coreg 12.5 twice daily.  She does not tolerate higher doses due to bradycardia.  Continue hydralazine 25 every 8 hours. Valsartan to 160 mg twice daily, HCTZ 25.  Clonidine as needed for severe hypertension hopefully this will help keep her out of the emergency room.  2.  Chest pain: Resolved, treadmill stress somewhat suboptimal but no recurrence of symptoms or EKG abnormalities  3.  History of PE 2016 postoperative hysterectomy  4.  CVA August 2022: Aspirin, Lipitor 40, blood pressure troll    Dr. Bush, thank you very much for referring this kind patient to me. Please call me with any questions or concerns. I will see the patient again in the office in 3 months.          Leighann Echols MD  12/29/22  [facility]    Outpatient Encounter Medications as of 12/29/2022   Medication Sig Dispense Refill   • aspirin 81 MG chewable tablet Chew 1 tablet Daily. 90 tablet 0   • atorvastatin (LIPITOR) 40 MG tablet Take 1 tablet by mouth Every Night. 30 tablet 0   • Calcium Carbonate-Vit D-Min (CALCIUM 1200 PO) Take  by mouth.     • carvedilol (COREG) 12.5 MG tablet Take 1 tablet by mouth 2 (Two) Times a Day. 180 tablet 3   • Cholecalciferol (Vitamin D3) 10 MCG (400 UNIT) capsule Daily.     • cloNIDine (Catapres) 0.1 MG tablet Take 1 tablet by mouth Daily As Needed for High Blood Pressure (for BP that is persistently elevated >200/100). 30 tablet 0   • gabapentin (NEURONTIN) 100 MG capsule 100mg in am and 100mg mid-day and 200mg at night 120 capsule 2   • hydrALAZINE (APRESOLINE) 25 MG tablet Take 1 tablet by mouth 3 (Three) Times a Day. 270 tablet 1   • hydroCHLOROthiazide (HYDRODIURIL) 25 MG tablet Take 1 tablet by mouth Daily. 90 tablet 3   • valsartan (DIOVAN) 160 MG tablet Take 1 tablet by mouth 2 (Two) Times a Day. 90 tablet 3   • [DISCONTINUED] hydroCHLOROthiazide (HYDRODIURIL) 25 MG tablet Take 1 tablet by mouth Daily. 30 tablet 11     No facility-administered encounter medications on file as of 12/29/2022.

## 2023-01-23 ENCOUNTER — OFFICE VISIT (OUTPATIENT)
Dept: SLEEP MEDICINE | Facility: HOSPITAL | Age: 58
End: 2023-01-23
Payer: COMMERCIAL

## 2023-01-23 VITALS
WEIGHT: 160 LBS | BODY MASS INDEX: 25.71 KG/M2 | DIASTOLIC BLOOD PRESSURE: 70 MMHG | HEIGHT: 66 IN | HEART RATE: 53 BPM | OXYGEN SATURATION: 95 % | SYSTOLIC BLOOD PRESSURE: 116 MMHG

## 2023-01-23 DIAGNOSIS — G47.33 OSA (OBSTRUCTIVE SLEEP APNEA): Primary | ICD-10-CM

## 2023-01-23 DIAGNOSIS — Z91.89 AT RISK FOR OBSTRUCTIVE SLEEP APNEA: ICD-10-CM

## 2023-01-23 PROCEDURE — G0463 HOSPITAL OUTPT CLINIC VISIT: HCPCS

## 2023-01-23 NOTE — PROGRESS NOTES
[facility]      Patient Care Team:  Clem Bush MD as PCP - General  Clem Bush MD as PCP - Family Medicine    Referring Provider: Samantha Cardenas APRN    Chief complaint:   Referred for sleep apnea evaluation    History of present illness:    Subjective   This is a 57 year old female patient with hx of resistant HTN and ischemic stroke.    She had a history of left pontine stroke In 2022, cryptogenic.   She is referred here by neurology for evaluation of sleep apnea.    She reported loud snoring which sometimes awakens her at home and occasionally disrupt her .  She denies waking up gasping or choking but stated that she wakes up sometimes with dry mouth and sore throat.  She grinds her teeth as well.  Sleep is not refreshing.    Sleep schedule:  -Bedtime: 1030-11 PM  -Sleep latency: Up to 1 hour  -Wake up time: 6:15 AM, does not feel refreshed  -Nocturnal awakenin-2 times because of nocturia. No difficulties going back to sleep.  -Perceived sleep hours: 7      ESS: Total score: 10     Review of Systems  Constitutional: No fever or chills. No changes in appetite.   ENMT: No sinus congestion, postnasal drip, hoarsness  Cardiovascular: No chest pain, palpitation or legs swelling.    Respiratory: No dyspnea, cough or wheezing.  Gastrointestinal: No constipation, diarrhea, abdominal pain or acid reflux  Neurology: No headache, weakness, numbness or dizziness.   Musculoskeletal: Swelling and pain in joints.  Psychiatry: Depression and anxiety  Hem/Lymphatic: No swollen glands or easy bruising.  Integumentary: No rash.  Endocrinology: No excessive thirst, cold or warm intolerance.   Urinary: No dysuria, bloody urine but frequent urination.     History  Past Medical History:   Diagnosis Date   • Arthritis    • Hypertension    • PE (pulmonary embolism)    • Pleurisy    ,   Past Surgical History:   Procedure Laterality Date    • CATARACT EXTRACTION     • HYSTERECTOMY     • LASIK     • SINUS SURGERY     ,   Family History   Problem Relation Age of Onset   • Stroke Mother    • Hypertension Mother    • Multiple sclerosis Father    ,   Social History     Socioeconomic History   • Marital status:    Tobacco Use   • Smoking status: Never   • Smokeless tobacco: Never   Vaping Use   • Vaping Use: Never used   Substance and Sexual Activity   • Alcohol use: No   • Drug use: No   • Sexual activity: Yes     Partners: Male     E-cigarette/Vaping   • E-cigarette/Vaping Use Never User    • Passive Exposure No    • Counseling Given No      E-cigarette/Vaping Substances   • Nicotine No    • THC No    • CBD No    • Flavoring No      E-cigarette/Vaping Devices   • Disposable No    • Pre-filled or Refillable Cartridge No    • Refillable Tank No    • Pre-filled Pod No         and Allergies:  Doxycycline and Sulfa antibiotics    Medications:    Current Outpatient Medications:   •  aspirin 81 MG chewable tablet, Chew 1 tablet Daily., Disp: 90 tablet, Rfl: 0  •  atorvastatin (LIPITOR) 40 MG tablet, Take 1 tablet by mouth Every Night., Disp: 30 tablet, Rfl: 0  •  Calcium Carbonate-Vit D-Min (CALCIUM 1200 PO), Take  by mouth., Disp: , Rfl:   •  carvedilol (COREG) 12.5 MG tablet, Take 1 tablet by mouth 2 (Two) Times a Day., Disp: 180 tablet, Rfl: 3  •  Cholecalciferol (Vitamin D3) 10 MCG (400 UNIT) capsule, Daily., Disp: , Rfl:   •  cloNIDine (Catapres) 0.1 MG tablet, Take 1 tablet by mouth Daily As Needed for High Blood Pressure (for BP that is persistently elevated >200/100)., Disp: 30 tablet, Rfl: 0  •  gabapentin (NEURONTIN) 100 MG capsule, 100mg in am and 100mg mid-day and 200mg at night, Disp: 120 capsule, Rfl: 2  •  hydrALAZINE (APRESOLINE) 25 MG tablet, Take 1 tablet by mouth 3 (Three) Times a Day., Disp: 270 tablet, Rfl: 1  •  hydroCHLOROthiazide (HYDRODIURIL) 25 MG tablet, Take 1 tablet by mouth Daily., Disp: 90 tablet, Rfl: 3  •  valsartan  "(DIOVAN) 160 MG tablet, Take 1 tablet by mouth 2 (Two) Times a Day., Disp: 90 tablet, Rfl: 3      Objective   Vital Signs:  Vitals:    01/23/23 0938   BP: 116/70   Pulse: 53   SpO2: 95%   Weight: 72.6 kg (160 lb)   Height: 167.6 cm (66\")     Body mass index is 25.82 kg/m².  Neck Circumference: 12 inches     Physical Exam:  Neck Circumference: 12 inches     Constitutional: Not in acute distress.  Eyes: Injected conjunctiva, EOMI. pupils equal reactive to light.  ENMT: Adam score 3. Mallampati score 3. No oral thrush. Tonsils grade 1. Narrow distance in between the posterior pharyngeal pillars (<25 %). Mildly scalloped tongue.  Neck:  No thyromegaly.  Trachea midline.  Heart: Regular rhythm and rate, no murmur  Lungs: Good and equal air entry bilaterally. No crackles or wheezing.  Nonlabored breathing.       Abdomen: Overweight.  Soft.  No tenderness.  Positive bowel sounds.  Extremities: No cyanosis, clubbing or pitting edema.  Warm extremities and well-perfused.  Neuro: Conscious, alert, oriented x3.  Gait is normal.  Strength 5/5 in arms and legs.  Psych: Appropriate mood and affect.    Integumentary: No rash.  Normal skin turgor.  Lymphatic: No palpable cervical or supraclavicular lymph nodes.    Diagnostic data:  Lab Results   Component Value Date    HGBA1C 5.50 08/03/2022     Total Cholesterol   Date Value Ref Range Status   12/08/2022 89 0 - 200 mg/dL Final     Triglycerides   Date Value Ref Range Status   12/08/2022 82 0 - 150 mg/dL Final   06/04/2020 138 0 - 149 mg/dL Final     HDL Cholesterol   Date Value Ref Range Status   12/08/2022 35 (L) 40 - 60 mg/dL Final   06/04/2020 33 (L) >49 mg/dL Final     Hemoglobin   Date Value Ref Range Status   12/09/2022 13.5 12.0 - 15.9 g/dL Final   06/15/2022 14.4 12.0 - 16.0 g/dL Final     CO2   Date Value Ref Range Status   12/09/2022 23.4 22.0 - 29.0 mmol/L Final     Total CO2   Date Value Ref Range Status   06/15/2022 25 22 - 29 mmol/L Final       Assessment "   1. Snoring, probable sleep apnea  2. Overweight, BMI 25  3. History of left pontine ischemic stroke August 2022  4. Resistant HTN  5. Hypersomnia      Plan:  Check HST. We discussed the pathophysiology of sleep apnea, testing and therapy which include CPAP and weight loss.  Patient is agreeable with CPAP therapy if needed.    Counseled for weight loss.  Encouraged to exercise regularly and cut down on carbohydrates.  Discussed that losing weight may decrease the severity of sleep apnea and obviate the need of CPAP therapy.    Discussed the association between obstructive sleep apnea and cardiovascular disease including stroke and HTN and the beneficial effect of Pap therapy in reducing the risk of major cardiovascular events.          Alex Joe MD  01/23/23  10:01 EST    This note was dictated utilizing Dragon dictation

## 2023-02-13 DIAGNOSIS — M54.50 LOW BACK PAIN WITHOUT SCIATICA, UNSPECIFIED BACK PAIN LATERALITY, UNSPECIFIED CHRONICITY: ICD-10-CM

## 2023-02-13 NOTE — TELEPHONE ENCOUNTER
Caller: Luis Medinaserena DOUGLAS    Relationship: Self    Best call back number: [phone]    Requested Prescriptions:   Requested Prescriptions     Pending Prescriptions Disp Refills   • gabapentin (NEURONTIN) 100 MG capsule 120 capsule 2     Simg in am and 100mg mid-day and 200mg at night        Pharmacy where request should be sent: [facility]/PHARMACY #[id number] [place], [place] - [address] [place] [address] AT [facility] - [phone]  - [phone] FX     Additional details provided by patient: PT WOULD LIKE TO HAVE A 90 DAY SUPPLY.  PT HAS ENOUGH UNTIL TOMORROW AFTERNOON    Does the patient have less than a 3 day supply:  [x] Yes  [] No    Would you like a call back once the refill request has been completed: [] Yes [x] No    If the office needs to give you a call back, can they leave a voicemail: [] Yes [x] No    Sherice Woody Rep   23 13:48 EST

## 2023-02-14 RX ORDER — GABAPENTIN 100 MG/1
CAPSULE ORAL
Qty: 120 CAPSULE | Refills: 2 | Status: SHIPPED | OUTPATIENT
Start: 2023-02-14

## 2023-03-02 ENCOUNTER — TELEPHONE (OUTPATIENT)
Dept: NEUROLOGY | Facility: CLINIC | Age: 58
End: 2023-03-02
Payer: COMMERCIAL

## 2023-03-02 NOTE — TELEPHONE ENCOUNTER
Called to s/w patient. Patient reports plan is for her to take diclofenac 50mg BID x 2 weeks then prn. Discussed risk and benefits of this medication w/ aspirin. S/S of stroke reviewed. Pt. Verbalizes understanding. Planned follow-up in May.      ----- Message from Kaylie Gilliam MA sent at 2/27/2023 11:17 AM EST -----  Regarding: FW: Diclofenac  Contact: [phone]  Please review  Thank you  ----- Message -----  From: Emy Medina  Sent: 2/26/2023  10:42 PM EST  To: [facility]  Subject: Diclofenac                                       Would like your opinion on me taking Diclofenac 50 mg 2x daily.  I had an appt with my PCP on Thursday because my back pain is now at a point where it's interferring w/my job & I'm unable to stand for more than 10 minutes without pain.  In addition to that, I now have a bulging disc in my neck that is causing pain.  I'm also having some arthritic pain.  I'm still taking Gabapentin 100 mg capsules (2 @ 6:30am, 1 @ 2:30pm & 1 @ 10:30pm).  He prescribed Diclofenac for 2 weeks for inflammation & if I have some relief, then to take it as needed.  I read that a side effect is stroke but with all my current joint & bone pain, I don't have too many options.  My nightly bp readings (attached) have been much lower since adding the Hydrochlorothiazide 25mg.  Thanks!

## 2023-03-21 ENCOUNTER — APPOINTMENT (OUTPATIENT)
Dept: CT IMAGING | Facility: HOSPITAL | Age: 58
End: 2023-03-21
Payer: COMMERCIAL

## 2023-03-21 ENCOUNTER — APPOINTMENT (OUTPATIENT)
Dept: GENERAL RADIOLOGY | Facility: HOSPITAL | Age: 58
End: 2023-03-21
Payer: COMMERCIAL

## 2023-03-21 ENCOUNTER — TELEPHONE (OUTPATIENT)
Dept: CARDIOLOGY | Facility: CLINIC | Age: 58
End: 2023-03-21
Payer: COMMERCIAL

## 2023-03-21 ENCOUNTER — HOSPITAL ENCOUNTER (OUTPATIENT)
Facility: HOSPITAL | Age: 58
Setting detail: OBSERVATION
Discharge: HOME | End: 2023-03-22
Attending: EMERGENCY MEDICINE | Admitting: HOSPITALIST
Payer: COMMERCIAL

## 2023-03-21 DIAGNOSIS — N17.9 ACUTE KIDNEY INJURY: ICD-10-CM

## 2023-03-21 DIAGNOSIS — I95.9 TRANSIENT HYPOTENSION: ICD-10-CM

## 2023-03-21 DIAGNOSIS — E87.6 HYPOKALEMIA: ICD-10-CM

## 2023-03-21 DIAGNOSIS — D72.829 LEUKOCYTOSIS, UNSPECIFIED TYPE: ICD-10-CM

## 2023-03-21 DIAGNOSIS — I48.91 NEW ONSET ATRIAL FIBRILLATION: Primary | ICD-10-CM

## 2023-03-21 DIAGNOSIS — I48.91 ATRIAL FIBRILLATION WITH RVR: ICD-10-CM

## 2023-03-21 PROBLEM — R73.9 HYPERGLYCEMIA: Status: ACTIVE | Noted: 2023-03-21

## 2023-03-21 LAB
ALBUMIN SERPL-MCNC: 4.1 G/DL (ref 3.5–5.2)
ALBUMIN/GLOB SERPL: 1.7 G/DL
ALP SERPL-CCNC: 84 U/L (ref 39–117)
ALT SERPL W P-5'-P-CCNC: 54 U/L (ref 1–33)
ANION GAP SERPL CALCULATED.3IONS-SCNC: 12.7 MMOL/L (ref 5–15)
ANISOCYTOSIS BLD QL: ABNORMAL
AST SERPL-CCNC: 25 U/L (ref 1–32)
BILIRUB SERPL-MCNC: 0.5 MG/DL (ref 0–1.2)
BUN SERPL-MCNC: 45 MG/DL (ref 8–23)
BUN/CREAT SERPL: 32.8 (ref 7–25)
CALCIUM SPEC-SCNC: 9.6 MG/DL (ref 8.6–10.5)
CHLORIDE SERPL-SCNC: 99 MMOL/L (ref 98–107)
CO2 SERPL-SCNC: 25.3 MMOL/L (ref 22–29)
CREAT SERPL-MCNC: 1.37 MG/DL (ref 0.57–1)
D DIMER PPP FEU-MCNC: 0.63 MCGFEU/ML (ref 0–0.6)
DEPRECATED RDW RBC AUTO: 41.1 FL (ref 37–54)
EGFRCR SERPLBLD CKD-EPI 2021: 44.3 ML/MIN/1.73
ERYTHROCYTE [DISTWIDTH] IN BLOOD BY AUTOMATED COUNT: 12.5 % (ref 12.3–15.4)
GLOBULIN UR ELPH-MCNC: 2.4 GM/DL
GLUCOSE BLDC GLUCOMTR-MCNC: 115 MG/DL (ref 70–130)
GLUCOSE BLDC GLUCOMTR-MCNC: 152 MG/DL (ref 70–130)
GLUCOSE SERPL-MCNC: 158 MG/DL (ref 65–99)
HCT VFR BLD AUTO: 44.4 % (ref 34–46.6)
HGB BLD-MCNC: 15.4 G/DL (ref 12–15.9)
LYMPHOCYTES # BLD MANUAL: 1.91 10*3/MM3 (ref 0.7–3.1)
LYMPHOCYTES NFR BLD MANUAL: 4 % (ref 5–12)
MAGNESIUM SERPL-MCNC: 2.2 MG/DL (ref 1.6–2.4)
MCH RBC QN AUTO: 31 PG (ref 26.6–33)
MCHC RBC AUTO-ENTMCNC: 34.7 G/DL (ref 31.5–35.7)
MCV RBC AUTO: 89.5 FL (ref 79–97)
MONOCYTES # BLD: 0.85 10*3/MM3 (ref 0.1–0.9)
NEUTROPHILS # BLD AUTO: 18.48 10*3/MM3 (ref 1.7–7)
NEUTROPHILS NFR BLD MANUAL: 87 % (ref 42.7–76)
PLAT MORPH BLD: NORMAL
PLATELET # BLD AUTO: 334 10*3/MM3 (ref 140–450)
PMV BLD AUTO: 10.1 FL (ref 6–12)
POTASSIUM SERPL-SCNC: 3.2 MMOL/L (ref 3.5–5.2)
PROT SERPL-MCNC: 6.5 G/DL (ref 6–8.5)
QT INTERVAL: 303 MS
RBC # BLD AUTO: 4.96 10*6/MM3 (ref 3.77–5.28)
SODIUM SERPL-SCNC: 137 MMOL/L (ref 136–145)
T4 FREE SERPL-MCNC: 1.8 NG/DL (ref 0.93–1.7)
TROPONIN T SERPL HS-MCNC: 29 NG/L
TSH SERPL DL<=0.05 MIU/L-ACNC: 0.49 UIU/ML (ref 0.27–4.2)
VARIANT LYMPHS NFR BLD MANUAL: 9 % (ref 19.6–45.3)
WBC MORPH BLD: NORMAL
WBC NRBC COR # BLD: 21.24 10*3/MM3 (ref 3.4–10.8)

## 2023-03-21 PROCEDURE — 96372 THER/PROPH/DIAG INJ SC/IM: CPT

## 2023-03-21 PROCEDURE — 71045 X-RAY EXAM CHEST 1 VIEW: CPT

## 2023-03-21 PROCEDURE — 25510000001 IOPAMIDOL PER 1 ML: Performed by: INTERNAL MEDICINE

## 2023-03-21 PROCEDURE — 84439 ASSAY OF FREE THYROXINE: CPT | Performed by: EMERGENCY MEDICINE

## 2023-03-21 PROCEDURE — G0378 HOSPITAL OBSERVATION PER HR: HCPCS

## 2023-03-21 PROCEDURE — 85007 BL SMEAR W/DIFF WBC COUNT: CPT | Performed by: EMERGENCY MEDICINE

## 2023-03-21 PROCEDURE — 71275 CT ANGIOGRAPHY CHEST: CPT

## 2023-03-21 PROCEDURE — 83735 ASSAY OF MAGNESIUM: CPT | Performed by: EMERGENCY MEDICINE

## 2023-03-21 PROCEDURE — 96360 HYDRATION IV INFUSION INIT: CPT

## 2023-03-21 PROCEDURE — 93005 ELECTROCARDIOGRAM TRACING: CPT | Performed by: EMERGENCY MEDICINE

## 2023-03-21 PROCEDURE — 25010000002 ENOXAPARIN PER 10 MG: Performed by: EMERGENCY MEDICINE

## 2023-03-21 PROCEDURE — 93005 ELECTROCARDIOGRAM TRACING: CPT

## 2023-03-21 PROCEDURE — 93010 ELECTROCARDIOGRAM REPORT: CPT | Performed by: STUDENT IN AN ORGANIZED HEALTH CARE EDUCATION/TRAINING PROGRAM

## 2023-03-21 PROCEDURE — 82962 GLUCOSE BLOOD TEST: CPT

## 2023-03-21 PROCEDURE — 80050 GENERAL HEALTH PANEL: CPT | Performed by: EMERGENCY MEDICINE

## 2023-03-21 PROCEDURE — 84484 ASSAY OF TROPONIN QUANT: CPT | Performed by: EMERGENCY MEDICINE

## 2023-03-21 PROCEDURE — 96361 HYDRATE IV INFUSION ADD-ON: CPT

## 2023-03-21 PROCEDURE — 85379 FIBRIN DEGRADATION QUANT: CPT | Performed by: INTERNAL MEDICINE

## 2023-03-21 PROCEDURE — 99285 EMERGENCY DEPT VISIT HI MDM: CPT

## 2023-03-21 RX ORDER — POTASSIUM CHLORIDE 750 MG/1
40 TABLET, FILM COATED, EXTENDED RELEASE ORAL ONCE
Status: COMPLETED | OUTPATIENT
Start: 2023-03-21 | End: 2023-03-21

## 2023-03-21 RX ORDER — POTASSIUM CHLORIDE 1.5 G/1.77G
40 POWDER, FOR SOLUTION ORAL AS NEEDED
Status: DISCONTINUED | OUTPATIENT
Start: 2023-03-21 | End: 2023-03-22 | Stop reason: HOSPADM

## 2023-03-21 RX ORDER — ENOXAPARIN SODIUM 100 MG/ML
1 INJECTION SUBCUTANEOUS ONCE
Status: COMPLETED | OUTPATIENT
Start: 2023-03-21 | End: 2023-03-21

## 2023-03-21 RX ORDER — SODIUM CHLORIDE 0.9 % (FLUSH) 0.9 %
10 SYRINGE (ML) INJECTION EVERY 12 HOURS SCHEDULED
Status: DISCONTINUED | OUTPATIENT
Start: 2023-03-21 | End: 2023-03-22 | Stop reason: HOSPADM

## 2023-03-21 RX ORDER — NITROGLYCERIN 0.4 MG/1
0.4 TABLET SUBLINGUAL
Status: DISCONTINUED | OUTPATIENT
Start: 2023-03-21 | End: 2023-03-22 | Stop reason: HOSPADM

## 2023-03-21 RX ORDER — CARVEDILOL 12.5 MG/1
12.5 TABLET ORAL 2 TIMES DAILY
Status: DISCONTINUED | OUTPATIENT
Start: 2023-03-21 | End: 2023-03-22 | Stop reason: HOSPADM

## 2023-03-21 RX ORDER — DEXTROSE MONOHYDRATE 25 G/50ML
25 INJECTION, SOLUTION INTRAVENOUS
Status: DISCONTINUED | OUTPATIENT
Start: 2023-03-21 | End: 2023-03-22 | Stop reason: HOSPADM

## 2023-03-21 RX ORDER — ATORVASTATIN CALCIUM 20 MG/1
40 TABLET, FILM COATED ORAL NIGHTLY
Status: DISCONTINUED | OUTPATIENT
Start: 2023-03-21 | End: 2023-03-22 | Stop reason: HOSPADM

## 2023-03-21 RX ORDER — POTASSIUM CHLORIDE 750 MG/1
40 TABLET, FILM COATED, EXTENDED RELEASE ORAL AS NEEDED
Status: DISCONTINUED | OUTPATIENT
Start: 2023-03-21 | End: 2023-03-22 | Stop reason: HOSPADM

## 2023-03-21 RX ORDER — NICOTINE POLACRILEX 4 MG
15 LOZENGE BUCCAL
Status: DISCONTINUED | OUTPATIENT
Start: 2023-03-21 | End: 2023-03-22 | Stop reason: HOSPADM

## 2023-03-21 RX ORDER — CIPROFLOXACIN 500 MG/1
1 TABLET, FILM COATED ORAL EVERY 12 HOURS SCHEDULED
COMMUNITY
Start: 2023-03-13 | End: 2023-03-22 | Stop reason: HOSPADM

## 2023-03-21 RX ORDER — SODIUM CHLORIDE 0.9 % (FLUSH) 0.9 %
10 SYRINGE (ML) INJECTION AS NEEDED
Status: DISCONTINUED | OUTPATIENT
Start: 2023-03-21 | End: 2023-03-22 | Stop reason: HOSPADM

## 2023-03-21 RX ORDER — INSULIN LISPRO 100 [IU]/ML
0-9 INJECTION, SOLUTION INTRAVENOUS; SUBCUTANEOUS
Status: DISCONTINUED | OUTPATIENT
Start: 2023-03-21 | End: 2023-03-22 | Stop reason: HOSPADM

## 2023-03-21 RX ORDER — SODIUM CHLORIDE 9 MG/ML
40 INJECTION, SOLUTION INTRAVENOUS AS NEEDED
Status: DISCONTINUED | OUTPATIENT
Start: 2023-03-21 | End: 2023-03-22 | Stop reason: HOSPADM

## 2023-03-21 RX ORDER — IBUPROFEN 600 MG/1
1 TABLET ORAL
Status: DISCONTINUED | OUTPATIENT
Start: 2023-03-21 | End: 2023-03-22 | Stop reason: HOSPADM

## 2023-03-21 RX ORDER — ASPIRIN 81 MG/1
81 TABLET, CHEWABLE ORAL DAILY
Status: DISCONTINUED | OUTPATIENT
Start: 2023-03-21 | End: 2023-03-22 | Stop reason: HOSPADM

## 2023-03-21 RX ORDER — CEPHALEXIN 500 MG/1
1 CAPSULE ORAL EVERY 12 HOURS SCHEDULED
COMMUNITY
Start: 2023-03-07 | End: 2023-03-22 | Stop reason: HOSPADM

## 2023-03-21 RX ORDER — GABAPENTIN 100 MG/1
100 CAPSULE ORAL NIGHTLY
Status: DISCONTINUED | OUTPATIENT
Start: 2023-03-21 | End: 2023-03-22 | Stop reason: HOSPADM

## 2023-03-21 RX ORDER — SODIUM CHLORIDE 9 MG/ML
75 INJECTION, SOLUTION INTRAVENOUS CONTINUOUS
Status: DISCONTINUED | OUTPATIENT
Start: 2023-03-21 | End: 2023-03-22

## 2023-03-21 RX ORDER — POTASSIUM CHLORIDE 7.45 MG/ML
10 INJECTION INTRAVENOUS
Status: DISCONTINUED | OUTPATIENT
Start: 2023-03-21 | End: 2023-03-22 | Stop reason: HOSPADM

## 2023-03-21 RX ORDER — ONDANSETRON 2 MG/ML
4 INJECTION INTRAMUSCULAR; INTRAVENOUS EVERY 6 HOURS PRN
Status: DISCONTINUED | OUTPATIENT
Start: 2023-03-21 | End: 2023-03-22 | Stop reason: HOSPADM

## 2023-03-21 RX ORDER — PREDNISONE 20 MG/1
TABLET ORAL
COMMUNITY
Start: 2023-03-16

## 2023-03-21 RX ADMIN — SODIUM CHLORIDE, POTASSIUM CHLORIDE, SODIUM LACTATE AND CALCIUM CHLORIDE 1000 ML: 600; 310; 30; 20 INJECTION, SOLUTION INTRAVENOUS at 12:58

## 2023-03-21 RX ADMIN — IOPAMIDOL 100 ML: 755 INJECTION, SOLUTION INTRAVENOUS at 22:56

## 2023-03-21 RX ADMIN — ATORVASTATIN CALCIUM 40 MG: 20 TABLET, FILM COATED ORAL at 20:56

## 2023-03-21 RX ADMIN — Medication 10 ML: at 20:57

## 2023-03-21 RX ADMIN — ENOXAPARIN SODIUM 70 MG: 100 INJECTION SUBCUTANEOUS at 15:49

## 2023-03-21 RX ADMIN — POTASSIUM CHLORIDE 40 MEQ: 750 TABLET, EXTENDED RELEASE ORAL at 15:45

## 2023-03-21 RX ADMIN — SODIUM CHLORIDE 125 ML/HR: 9 INJECTION, SOLUTION INTRAVENOUS at 21:40

## 2023-03-21 RX ADMIN — ASPIRIN 81 MG: 81 TABLET, CHEWABLE ORAL at 20:57

## 2023-03-21 NOTE — ED TRIAGE NOTES
Patient to ER via car from work for chest pain states they took her BP at work and it was 70/50    Patient states she was just released from [place] last week    Patient wearing mask this Rn in PPE

## 2023-03-21 NOTE — ED NOTES
Nursing report ED to floor  Emy Medina  58 y.o.  female    HPI :   Chief Complaint   Patient presents with    Hypotension    Chest Pain       Admitting doctor:   Jesse Sofia MD    Admitting diagnosis:   The primary encounter diagnosis was New onset atrial fibrillation (HCC). Diagnoses of Atrial fibrillation with RVR (HCC), Acute kidney injury (HCC), Leukocytosis, unspecified type, and Transient hypotension were also pertinent to this visit.    Code status:   Current Code Status       Date Active Code Status Order ID Comments User Context       Prior            Allergies:   Doxycycline and Sulfa antibiotics    Isolation:   No active isolations    Intake and Output    Intake/Output Summary (Last 24 hours) at 3/21/2023 1527  Last data filed at 3/21/2023 1350  Gross per 24 hour   Intake 1000 ml   Output --   Net 1000 ml       Weight:       03/21/23  1229   Weight: 71.7 kg (158 lb)       Most recent vitals:   Vitals:    03/21/23 1325 03/21/23 1331 03/21/23 1400 03/21/23 1401   BP: 92/75 101/80  101/72   BP Location: Right arm   Right arm   Patient Position: Lying   Lying   Pulse: 103 109 101 101   Resp: 16   16   Temp:       SpO2: 98% 99% 93% 95%   Weight:       Height:           Active LDAs/IV Access:   Lines, Drains & Airways       Active LDAs       Name Placement date Placement time Site Days    Peripheral IV 03/21/23 1236 Anterior;Left;Proximal Forearm 03/21/23  1236  Forearm  less than 1                    Labs (abnormal labs have a star):   Labs Reviewed   COMPREHENSIVE METABOLIC PANEL - Abnormal; Notable for the following components:       Result Value    Glucose 158 (*)     BUN 45 (*)     Creatinine 1.37 (*)     Potassium 3.2 (*)     ALT (SGPT) 54 (*)     BUN/Creatinine Ratio 32.8 (*)     eGFR 44.3 (*)     All other components within normal limits    Narrative:     GFR Normal >60  Chronic Kidney Disease <60  Kidney Failure <15     SINGLE HSTROPONIN T - Abnormal; Notable for the following components:    HS  Troponin T 29 (*)     All other components within normal limits    Narrative:     High Sensitive Troponin T Reference Range:  <10.0 ng/L- Negative Female for AMI  <15.0 ng/L- Negative Male for AMI  >=10 - Abnormal Female indicating possible myocardial injury.  >=15 - Abnormal Male indicating possible myocardial injury.   Clinicians would have to utilize clinical acumen, EKG, Troponin, and serial changes to determine if it is an Acute Myocardial Infarction or myocardial injury due to an underlying chronic condition.        T4, FREE - Abnormal; Notable for the following components:    Free T4 1.80 (*)     All other components within normal limits    Narrative:     Results may be falsely increased if patient taking Biotin.     CBC WITH AUTO DIFFERENTIAL - Abnormal; Notable for the following components:    WBC 21.24 (*)     All other components within normal limits   MANUAL DIFFERENTIAL - Abnormal; Notable for the following components:    Neutrophil % 87.0 (*)     Lymphocyte % 9.0 (*)     Monocyte % 4.0 (*)     Neutrophils Absolute 18.48 (*)     All other components within normal limits   TSH - Normal   MAGNESIUM - Normal   CBC AND DIFFERENTIAL    Narrative:     The following orders were created for panel order CBC & Differential.  Procedure                               Abnormality         Status                     ---------                               -----------         ------                     CBC Auto Differential[[id number]]        Abnormal            Final result                 Please view results for these tests on the individual orders.       EKG:   ECG 12 Lead Chest Pain   Preliminary Result   HEART RATE= 136  bpm   RR Interval= 441  ms   MO Interval=   ms   P Horizontal Axis=   deg   P Front Axis=   deg   QRSD Interval= 101  ms   QT Interval= 303  ms   QRS Axis= -19  deg   T Wave Axis= 172  deg   - ABNORMAL ECG -   Atrial fibrillation   LVH with secondary repolarization abnormality   Electronically  Signed By:    Date and Time of Study: 2023-03-21 12:29:39      SCANNED - TELEMETRY     Final Result      SCANNED - TELEMETRY     Final Result          Meds given in ED:   Medications   sodium chloride 0.9 % flush 10 mL (has no administration in time range)   lactated ringers bolus 1,000 mL (0 mL Intravenous Stopped 3/21/23 1350)       Imaging results:  XR Chest 1 View    Result Date: 3/21/2023  No focal pulmonary consolidation. Tortuous aorta. Follow-up as clinically indicated.  This report was finalized on 3/21/2023 1:15 PM by Dr. Mike Huynh M.D.       Ambulatory status:   - assist     Social issues:   Social History     Socioeconomic History    Marital status:    Tobacco Use    Smoking status: Never    Smokeless tobacco: Never   Vaping Use    Vaping Use: Never used   Substance and Sexual Activity    Alcohol use: No    Drug use: No    Sexual activity: Yes     Partners: Male       NIH Stroke Scale:         Lori Bernard RN  03/21/23 15:27 EDT

## 2023-03-21 NOTE — H&P
Internal medicine history and physical  INTERNAL MEDICINE   [facility]       Patient Identification:  Name: Emy Medina  Age: 58 y.o.  Sex: female  :  1965  MRN: [id number]                   Primary Care Physician: Clem Bush MD                               Date of admission:3/21/2023    Chief Complaint: Significant dizziness and lightheadedness and clammy feeling starting 8:30 in the morning while at work.    History of Present Illness:   Patient is a 58-year-old female who works in a school cafeteria has complicated past medical history including diagnosis of pulmonary embolism in  a week after her hysterectomy.  She was treated with anticoagulation therapy until May of that year.  She has done well since until 2022 and was found to have acute CVA for which reason was unclear.  She has been on follow-up with neurology and cardiology service since.  In this background around beginning of this months around 3/2 or 3/3/2023 she started developing sore throat difficulty swallowing and congestion and discomfort in her right ear.  She better with the symptoms for 5 days and eventually saw her primary care provider on 3/7/2023 and was diagnosed with right ear infection and pharyngitis and was given Keflex.  Her symptoms did not improve and she continued to decline with increasing pain on swallowing and sore throat so she went to immediate care center on 3/12/2023.  She does recall 3 days prior to her symptoms she was exposed to COVID-19.  She was evaluated in the immediate care center and was noted to have negative COVID-19 assay.  She was prescribed Augmentin with instructions to follow-up with her primary care provider.  She took 1 dose of Augmentin and developed significant diarrhea and abdominal cramps.  Patient went to see her primary care provider on 3/13/2023 and while at the doctor's office as she was being roomed she became very lightheaded and dizzy.  Because  of this change in status patient was sent to [facility] emergency room and was subsequently hospitalized and was in the hospital from 3/13/2023 through 3/16/2023 and was discharged on Avelox and prednisone.  She was supposed to take her last dose of Avelox today and overall felt better since her release from the hospital till this morning.  She drove herself to work feeling otherwise fine and while she was at the workplace she started feeling dizzy and cold and clammy with hot spells and felt very weak.  Eventually she was evaluated by school nurse and was noted to have low blood pressure and recorded as 70/50.  Her  was called and patient was brought to the emergency room.  She was also feeling palpitation and heart fluttering.  Upon arrival to the emergency room patient was noted to have atrial fibrillation with rapid ventricular response which is a new finding for her.  She was also noted to have white blood cell count of 21,000 troponin of 29 and creatinine of 1.37 and potassium 3.2.  Patient received IV fluids and Lovenox with improvement in heart rate and blood pressure and after discussion with cardiology service patient being admitted for further care.      Past Medical History:  Past Medical History:   Diagnosis Date   • Arthritis    • Hypertension    • PE (pulmonary embolism)    • Pleurisy      Past Surgical History:  Past Surgical History:   Procedure Laterality Date   • CATARACT EXTRACTION     • HYSTERECTOMY     • LASIK     • SINUS SURGERY        Home Meds:  Medications Prior to Admission   Medication Sig Dispense Refill Last Dose   • diclofenac (VOLTAREN) 50 MG EC tablet Take 1 tablet by mouth.      • aspirin 81 MG chewable tablet Chew 1 tablet Daily. 90 tablet 0    • atorvastatin (LIPITOR) 40 MG tablet Take 1 tablet by mouth Every Night. 30 tablet 0    • Calcium Carbonate-Vit D-Min (CALCIUM 1200 PO) Take  by mouth.      • carvedilol (COREG) 12.5 MG tablet Take 1 tablet by mouth 2 (Two)  "Times a Day. 180 tablet 3    • cephalexin (KEFLEX) 500 MG capsule Take 1 capsule by mouth Every 12 (Twelve) Hours.      • Cholecalciferol (Vitamin D3) 10 MCG (400 UNIT) capsule Daily.      • ciprofloxacin (CIPRO) 500 MG tablet Take 1 tablet by mouth Every 12 (Twelve) Hours.      • cloNIDine (Catapres) 0.1 MG tablet Take 1 tablet by mouth Daily As Needed for High Blood Pressure (for BP that is persistently elevated >200/100). 30 tablet 0    • gabapentin (NEURONTIN) 100 MG capsule 100mg in am and 100mg mid-day and 200mg at night 120 capsule 2    • hydrALAZINE (APRESOLINE) 25 MG tablet Take 1 tablet by mouth 3 (Three) Times a Day. 270 tablet 1    • hydroCHLOROthiazide (HYDRODIURIL) 25 MG tablet Take 1 tablet by mouth Daily. 90 tablet 3    • predniSONE (DELTASONE) 20 MG tablet       • valsartan (DIOVAN) 160 MG tablet Take 1 tablet by mouth 2 (Two) Times a Day. 90 tablet 3      Current Meds:     Current Facility-Administered Medications:   •  [COMPLETED] Insert Peripheral IV, , , Once **AND** sodium chloride 0.9 % flush 10 mL, 10 mL, Intravenous, PRN, Ervin Ricks MD  Allergies:  Allergies   Allergen Reactions   • Doxycycline    • Sulfa Antibiotics Hives     Social History:   Social History     Tobacco Use   • Smoking status: Never   • Smokeless tobacco: Never   Substance Use Topics   • Alcohol use: No      Family History:  Family History   Problem Relation Age of Onset   • Stroke Mother    • Hypertension Mother    • Multiple sclerosis Father           Review of Systems  See history of present illness and past medical history.    As described in history presenting illness.    Vitals:   /93 (BP Location: Left arm, Patient Position: Sitting)   Pulse 98   Temp 97.9 °F (36.6 °C)   Resp 16   Ht 167.6 cm (66\")   Wt 69.8 kg (153 lb 12.8 oz)   LMP  (LMP Unknown)   SpO2 95%   BMI 24.82 kg/m²   I/O:     Intake/Output Summary (Last 24 hours) at 3/21/2023 1700  Last data filed at 3/21/2023 1350  Gross per 24 " hour   Intake 1000 ml   Output --   Net 1000 ml     Exam:  Patient is examined using the personal protective equipment as per guidelines from infection control for this particular patient as enacted.  Hand washing was performed before and after patient interaction.  General Appearance:    Alert, cooperative, no distress, appears stated age   Head:    Normocephalic, without obvious abnormality, atraumatic   Eyes:    PERRL, conjunctiva/corneas clear, EOM's intact, both eyes   Ears:    Normal external ear canals, both ears   Nose:   Nares normal, septum midline, mucosa normal, no drainage    or sinus tenderness   Throat:   Lips, tongue, gums normal; oral mucosa pink and moist   Neck:   Supple, symmetrical, trachea midline, no adenopathy;     thyroid:  no enlargement/tenderness/nodules; no carotid    bruit or JVD   Back:     Symmetric, no curvature, ROM normal, no CVA tenderness   Lungs:     Clear to auscultation bilaterally, respirations unlabored   Chest Wall:    No tenderness or deformity    Heart:   At present bradycardic irregularly irregular   Abdomen:     Soft, non-tender, bowel sounds active all four quadrants,     no masses, no hepatomegaly, no splenomegaly   Extremities:   Extremities normal, atraumatic, no cyanosis or edema   Pulses:   Pulses palpable in all extremities; symmetric all extremities   Skin:   Skin color normal, Skin is warm and dry,  no rashes or palpable lesions   Neurologic:  Grossly nonfocal       Data Review:      I reviewed the patient's new clinical results.  Results from last 7 days   Lab Units 03/21/23  1256   WBC 10*3/mm3 21.24*   HEMOGLOBIN g/dL 15.4   PLATELETS 10*3/mm3 334     Results from last 7 days   Lab Units 03/21/23  1256   SODIUM mmol/L 137   POTASSIUM mmol/L 3.2*   CHLORIDE mmol/L 99   CO2 mmol/L 25.3   BUN mg/dL 45*   CREATININE mg/dL 1.37*   CALCIUM mg/dL 9.6   GLUCOSE mg/dL 158*     CT Angiogram Chest    Result Date: 3/22/2023  No acute intrathoracic findings.  Radiation  dose reduction techniques were utilized, including automated exposure control and exposure modulation based on body size.  This report was finalized on 3/22/2023 12:01 AM by Dr. Heather Patel M.D.      XR Chest 1 View    Result Date: 3/21/2023  No focal pulmonary consolidation. Tortuous aorta. Follow-up as clinically indicated.  This report was finalized on 3/21/2023 1:15 PM by Dr. Mike Huynh M.D.      Microbiology Results (last 10 days)     Procedure Component Value - Date/Time    COVID-19 PCR, [facility], NP SWAB IN [facility]AL TRANSPORT MEDIA/ORAL SWISH 24-30 HR TAT - , [[id number]] Collected: 03/12/23 1149    Lab Status: Final result Specimen: Other Updated: 03/21/23 1222     SARS-CoV-2, [facility] NEGATIVE     Comment: The 2019-CoV rRT-PCR Assay is only for use under a Food and Drug Administration Emergency Use Authorization. The performance characteristics of the assay were verified by the Clinical Laboratory at [facility]. Results should be used in   conjunction with the patient's clinical symptoms, medical history, and other clinical/laboratory findings to determine an overall clinical diagnosis. Negative results do not preclude infection with SARS-CoV-2 (COVID-19).    Test parameters have not been validated for screening asymptomatic patients.        Influenza A PCR NEGATIVE     Influenza B PCR NEGATIVE     RSV, PCR NEGATIVE     Test Comment: Yes     COMMENTS No     Symptom Yes     DATE 4 days     COMMENTS No     COMMENTS No     COMMENTS No     Pregnant? No        ECG 12 Lead Chest Pain   Final Result   HEART RATE= 136  bpm   RR Interval= 441  ms   LA Interval=   ms   P Horizontal Axis=   deg   P Front Axis=   deg   QRSD Interval= 101  ms   QT Interval= 303  ms   QRS Axis= -19  deg   T Wave Axis= 172  deg   - ABNORMAL ECG -   Atrial fibrillation   LVH with secondary repolarization abnormality   When compared with ECG of 09-Dec-2022 2:40:44,   Significant change in rhythm, perviously sinus     Electronically Signed By: Nitesh Tamayo ([facility]) 21-Mar-2023 15:58:24   Date and Time of Study: 2023-03-21 12:29:39      SCANNED - TELEMETRY     Final Result      SCANNED - TELEMETRY     Final Result      SCANNED - TELEMETRY     Final Result      SCANNED - TELEMETRY     Final Result      SCANNED - TELEMETRY     Final Result          Assessment:  Active Hospital Problems    Diagnosis  POA   • **New onset atrial fibrillation (HCC) [I48.91]  Yes   • Hyperglycemia [R73.9]  Unknown   • Leukocytosis [D72.829]  Unknown   • History of pulmonary embolism [Z86.711]  Unknown   • Hypokalemia [E87.6]  Unknown   • Pharyngitis [J02.9]  Unknown   • HTN (hypertension) [I10]  Yes   • FUENTES (acute kidney injury) (HCC) [N17.9]  Yes   • Cerebrovascular accident (CVA) (HCC) [I63.9]  Yes   • Chest pain [R07.9]  Yes       Medical decision making/care plan: See admitting orders  · Dizziness hypertension and palpitation with EKG revealing atrial fibrillation with rapid ventricular response apparently a new finding-this presentation could very well be due to overt presentation of underlying paroxysmal atrial fibrillation given the unexplained stroke that she had in August 2022 versus reaction to quinolones or electrolyte imbalance and dehydration in the setting of recent acute pharyngitis and hospitalization.  Plan is to control her heart rate, continue IV fluids for her blood pressure and use beta-blocker if her blood pressure allows and consult cardiology service.  Continue with Lovenox.  Rule out pulmonary embolism given recent hospitalization and prior history of PE by checking D-dimer.  If D-dimer is elevated consider CT scan of the chest PE protocol venous Doppler and echocardiogram.  · Recent hospitalization for pharyngitis with associated decreased intake and dehydration-continue with IV fluids.  · Prior history of CVA presenting in August as right leg weakness which has not improved and symptoms resolved etiology unclear-patient on  follow-up with neurology service and at present we will continue with aspirin and statin.  · Leukocytosis-multifactorial including demargination as well as recent use of steroids.  Plan is to monitor for any evolving symptoms and signs of infection including possibility of C. difficile infection given the Keflex Augmentin and Avelox that she has used during the last 14 days.  · Chest tightness with initial troponin elevated to 29-check serial troponin and cardiology consultation.  · Acute kidney injury-multifactorial including dehydration.  Avoid nephrotoxic agents and hypotension and continue with IV fluids.      Jesse Sofia MD   3/21/2023  17:00 EDT    Parts of this note may be an electronic transcription/translation of spoken language to printed text using the Dragon dictation system.

## 2023-03-21 NOTE — TELEPHONE ENCOUNTER
Patient called wanting you to be aware that she is on her way to the ER now. She has been having a lot of trouble with low BP. 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     Cesia Perez RN  Triage [facility]

## 2023-03-21 NOTE — PLAN OF CARE
Goal Outcome Evaluation:  Plan of Care Reviewed With: patient           Outcome Evaluation: arrived to unit around 1640, no c/o pain at this time.  stand by assist b/c of dizziness, alert and oriented X4, RA, afib, will cont to monitor  Problem: Adult Inpatient Plan of Care  Goal: Plan of Care Review  Outcome: Ongoing, Progressing  Flowsheets (Taken 3/21/2023 1728)  Plan of Care Reviewed With: patient  Outcome Evaluation: arrived to unit around 1640, no c/o pain at this time.  stand by assist b/c of dizziness, alert and oriented X4, RA, afib, will cont to monitor  Goal: Patient-Specific Goal (Individualized)  Outcome: Ongoing, Progressing  Goal: Absence of Hospital-Acquired Illness or Injury  Outcome: Ongoing, Progressing  Intervention: Identify and Manage Fall Risk  Recent Flowsheet Documentation  Taken 3/21/2023 1637 by Tg Pink RN  Safety Promotion/Fall Prevention:   activity supervised   assistive device/personal items within reach   clutter free environment maintained   fall prevention program maintained   nonskid shoes/slippers when out of bed   room organization consistent   safety round/check completed  Intervention: Prevent and Manage VTE (Venous Thromboembolism) Risk  Recent Flowsheet Documentation  Taken 3/21/2023 1637 by Tg Pink RN  Activity Management:   activity adjusted per tolerance   activity encouraged  Intervention: Prevent Infection  Recent Flowsheet Documentation  Taken 3/21/2023 1637 by Tg Pink RN  Infection Prevention: single patient room provided  Goal: Optimal Comfort and Wellbeing  Outcome: Ongoing, Progressing  Intervention: Provide Person-Centered Care  Recent Flowsheet Documentation  Taken 3/21/2023 1637 by Tg Pink RN  Trust Relationship/Rapport:   care explained   thoughts/feelings acknowledged  Goal: Readiness for Transition of Care  Outcome: Ongoing, Progressing  Intervention: Mutually Develop Transition Plan  Recent Flowsheet Documentation  Taken  3/21/2023 1632 by Tg Pink, RN  Transportation Anticipated: family or friend will provide  Patient/Family Anticipated Services at Transition: none  Patient/Family Anticipates Transition to: home with family  Taken 3/21/2023 1631 by Tg Pink, RN  Equipment Currently Used at Home: none

## 2023-03-21 NOTE — ED PROVIDER NOTES
EMERGENCY DEPARTMENT ENCOUNTER    Room Number:  35/35  Date seen:  3/21/2023  PCP: Clem Bush MD  Historian: Patient, spouse      HPI:  Chief Complaint: Low blood pressure  A complete HPI/ROS/PMH/PSH/SH/FH are unobtainable due to: Nothing  Context: Emy Medina is a 58 y.o. female who presents to the ED per private vehicle from work c/o low blood pressure.  Patient was at work when she began to feel clammy and lightheaded.  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  She also reports having a brief episode of tightness in her chest.  Chest pain did not radiate and was not associated with nausea, vomiting, or shortness of breath.  She also complains of intermittent palpitations today.  She was recently admitted to another facility for pharyngitis.  She is currently taking Levaquin and prednisone.  Denies fever, chills, cough, abdominal pain, or diarrhea.  Denies history of CAD or arrhythmia.  She has a history of hypertension and hyperlipidemia            PAST MEDICAL HISTORY  Active Ambulatory Problems     Diagnosis Date Noted   • Chest pain 02/17/2022   • Cerebrovascular accident (CVA) ([facility]) 08/02/2022   • HTN (hypertension) 08/03/2022   • Bradycardia 08/03/2022   • FUENTES (acute kidney injury) ([facility]) 08/03/2022   • FUENTES (acute kidney injury) ([facility]) 08/03/2022     Resolved Ambulatory Problems     Diagnosis Date Noted   • No Resolved Ambulatory Problems     Past Medical History:   Diagnosis Date   • Arthritis    • Hypertension    • PE (pulmonary embolism)    • Pleurisy          PAST SURGICAL HISTORY  Past Surgical History:   Procedure Laterality Date   • CATARACT EXTRACTION     • HYSTERECTOMY     • LASIK     • SINUS SURGERY           FAMILY HISTORY  Family History   Problem Relation Age of Onset   • Stroke Mother    • Hypertension Mother    • Multiple sclerosis Father          SOCIAL HISTORY  Social History     Socioeconomic History   • Marital status:    Tobacco Use   • Smoking  status: Never   • Smokeless tobacco: Never   Vaping Use   • Vaping Use: Never used   Substance and Sexual Activity   • Alcohol use: No   • Drug use: No   • Sexual activity: Yes     Partners: Male         ALLERGIES  Doxycycline and Sulfa antibiotics        REVIEW OF SYSTEMS  Review of Systems     All systems have been reviewed and are negative except as as discussed in the HPI    PHYSICAL EXAM  ED Triage Vitals [03/21/23 1223]   Temp Heart Rate Resp BP SpO2   97.9 °F (36.6 °C) 82 18 -- 94 %      Temp src Heart Rate Source Patient Position BP Location FiO2 (%)   -- -- -- -- --       Physical Exam      GENERAL: Awake, alert, oriented x3.  Well-developed, well-nourished female.  No acute distress  HENT: NCAT, nares patent, oropharynx is benign  EYES: no scleral icterus  CV: Irregularly irregular rhythm, tachycardic  RESPIRATORY: normal effort, clear to auscultation bilaterally  ABDOMEN: soft, nontender  MUSCULOSKELETAL: Extremities are nontender with full range of motion.  No calf tenderness.  No pedal edema  NEURO: Speech is normal.  No facial droop.  Normal strength in all extremities   PSYCH:  calm, cooperative  SKIN: warm, dry    Vital signs and nursing notes reviewed.          LAB RESULTS  Recent Results (from the past 24 hour(s))   ECG 12 Lead Chest Pain    Collection Time: 03/21/23 12:29 PM   Result Value Ref Range    QT Interval 303 ms   Comprehensive Metabolic Panel    Collection Time: 03/21/23 12:56 PM    Specimen: Blood   Result Value Ref Range    Glucose 158 (H) 65 - 99 mg/dL    BUN 45 (H) 8 - 23 mg/dL    Creatinine 1.37 (H) 0.57 - 1.00 mg/dL    Sodium 137 136 - 145 mmol/L    Potassium 3.2 (L) 3.5 - 5.2 mmol/L    Chloride 99 98 - 107 mmol/L    CO2 25.3 22.0 - 29.0 mmol/L    Calcium 9.6 8.6 - 10.5 mg/dL    Total Protein 6.5 6.0 - 8.5 g/dL    Albumin 4.1 3.5 - 5.2 g/dL    ALT (SGPT) 54 (H) 1 - 33 U/L    AST (SGOT) 25 1 - 32 U/L    Alkaline Phosphatase 84 39 - 117 U/L    Total Bilirubin 0.5 0.0 - 1.2 mg/dL     Globulin 2.4 gm/dL    A/G Ratio 1.7 g/dL    BUN/Creatinine Ratio 32.8 (H) 7.0 - 25.0    Anion Gap 12.7 5.0 - 15.0 mmol/L    eGFR 44.3 (L) >60.0 mL/min/1.73   Single High Sensitivity Troponin T    Collection Time: 03/21/23 12:56 PM    Specimen: Blood   Result Value Ref Range    HS Troponin T 29 (H) <10 ng/L   TSH    Collection Time: 03/21/23 12:56 PM    Specimen: Blood   Result Value Ref Range    TSH 0.494 0.270 - 4.200 uIU/mL   T4, Free    Collection Time: 03/21/23 12:56 PM    Specimen: Blood   Result Value Ref Range    Free T4 1.80 (H) 0.93 - 1.70 ng/dL   Magnesium    Collection Time: 03/21/23 12:56 PM    Specimen: Blood   Result Value Ref Range    Magnesium 2.2 1.6 - 2.4 mg/dL   CBC Auto Differential    Collection Time: 03/21/23 12:56 PM    Specimen: Blood   Result Value Ref Range    WBC 21.24 (H) 3.40 - 10.80 10*3/mm3    RBC 4.96 3.77 - 5.28 10*6/mm3    Hemoglobin 15.4 12.0 - 15.9 g/dL    Hematocrit 44.4 34.0 - 46.6 %    MCV 89.5 79.0 - 97.0 fL    MCH 31.0 26.6 - 33.0 pg    MCHC 34.7 31.5 - 35.7 g/dL    RDW 12.5 12.3 - 15.4 %    RDW-SD 41.1 37.0 - 54.0 fl    MPV 10.1 6.0 - 12.0 fL    Platelets 334 140 - 450 10*3/mm3   Manual Differential    Collection Time: 03/21/23 12:56 PM    Specimen: Blood   Result Value Ref Range    Neutrophil % 87.0 (H) 42.7 - 76.0 %    Lymphocyte % 9.0 (L) 19.6 - 45.3 %    Monocyte % 4.0 (L) 5.0 - 12.0 %    Neutrophils Absolute 18.48 (H) 1.70 - 7.00 10*3/mm3    Lymphocytes Absolute 1.91 0.70 - 3.10 10*3/mm3    Monocytes Absolute 0.85 0.10 - 0.90 10*3/mm3    Anisocytosis Slight/1+ None Seen    WBC Morphology Normal Normal    Platelet Morphology Normal Normal       Ordered the above labs and reviewed the results.        RADIOLOGY  XR Chest 1 View    Result Date: 3/21/2023  XR CHEST 1 VW-  HISTORY: Female who is 58 years-old,  chest pain  TECHNIQUE: Frontal view of the chest  COMPARISON: 12/08/2022  FINDINGS: Heart size is normal. Aorta is tortuous. Pulmonary vasculature is unremarkable. No  focal pulmonary consolidation, pleural effusion, or pneumothorax. No acute osseous process.      No focal pulmonary consolidation. Tortuous aorta. Follow-up as clinically indicated.  This report was finalized on 3/21/2023 1:15 PM by Dr. Mike Huynh M.D.        Ordered the above noted radiological studies. Reviewed by me in PACS.            PROCEDURES  Critical Care  Performed by: Ervin Ricks MD  Authorized by: Ervin Ricks MD     Critical care provider statement:     Critical care time (minutes):  34    Critical care time was exclusive of:  Separately billable procedures and treating other patients    Critical care was necessary to treat or prevent imminent or life-threatening deterioration of the following conditions:  Cardiac failure, circulatory failure, dehydration and renal failure    Critical care was time spent personally by me on the following activities:  Development of treatment plan with patient or surrogate, discussions with consultants, discussions with primary provider, evaluation of patient's response to treatment, examination of patient, obtaining history from patient or surrogate, ordering and performing treatments and interventions, ordering and review of laboratory studies, ordering and review of radiographic studies, pulse oximetry, re-evaluation of patient's condition and review of old charts    I assumed direction of critical care for this patient from another provider in my specialty: no      Care discussed with: admitting provider                  MEDICATIONS GIVEN IN ER  Medications   sodium chloride 0.9 % flush 10 mL (has no administration in time range)   lactated ringers bolus 1,000 mL (0 mL Intravenous Stopped 3/21/23 1350)   Enoxaparin Sodium (LOVENOX) syringe 70 mg (70 mg Subcutaneous Given 3/21/23 1549)   potassium chloride (K-DUR,KLOR-CON) ER tablet 40 mEq (40 mEq Oral Given 3/21/23 1545)                   MEDICAL DECISION MAKING, PROGRESS, and CONSULTS    All  labs have been independently reviewed by me.  All radiology studies have been reviewed by me and I have also reviewed the radiology report.   EKG's independently viewed and interpreted by me.  Discussion below represents my analysis of pertinent findings related to patient's condition, differential diagnosis, treatment plan and final disposition.      Additional sources:  - Discussed/ obtained information from independent historians:  at bedside    - External (non-ED) record review: Patient was admitted to [facility] 3/13 through 3/16/2023 for pharyngitis and sinusitis.  CT of the neck showed anterior cervical lymphadenopathy.  There was no peritonsillar abscess.  Blood cultures were negative.  She was treated with IV Unasyn and steroids.  She was discharged on oral antibiotics and a long steroid taper.  She had a stress test done in December 2022.  Findings were consistent with an indeterminate stress test.  No new ST changes were noted above baseline.  Echocardiogram done in August 2022 showed an LVEF of 62%.      - Chronic or social conditions impacting care: N/A          Orders placed during this visit:  Orders Placed This Encounter   Procedures   • Critical Care   • XR Chest 1 View   • Comprehensive Metabolic Panel   • Single High Sensitivity Troponin T   • TSH   • T4, Free   • Magnesium   • CBC Auto Differential   • Manual Differential   • Monitor Blood Pressure   • Cardiac Monitoring   • Pulse Oximetry, Continuous   • LCG (on-call MD unless specified)   • LHA (on-call MD unless specified) Details   • ECG 12 Lead Chest Pain   • SCANNED - TELEMETRY     • SCANNED - TELEMETRY     • Insert Peripheral IV   • Initiate Observation Status   • CBC & Differential         Additional orders considered but not ordered:  N/A        Differential diagnosis:    Sepsis, hypovolemia, dehydration, arrhythmia, dysrhythmia, acute coronary syndrome      Independent interpretation of labs, radiology studies, and  discussions with consultants:  ED Course as of 03/21/23 1605   Tue Mar 21, 2023   1251 EKG personally interpreted by me.  My personal interpretation is:          EKG time: 12:29 PM  Rhythm/Rate: Atrial fibrillation with rapid ventricular rate, rate 136  P waves and TX: Irregular, varying  QRS, axis: LAD, LVH  ST and T waves: ST depression in the anterior and lateral leads, no ST elevation    Interpreted Contemporaneously by me at 12:32 PM, independently viewed  EKG is changed compared to prior EKG done on 12/9/2022.  Sinus rhythm was present at that time with a rate of 44.   [WH]   1316 WBC(!): 21.24  Patient is currently on steroids.  White blood cell count was 10.5 on 3/16/2023 [WH]   1316 Chest x-ray personally interpreted by me.  My personal interpretation is: Heart size is normal.  Lungs are clear.  No pneumothorax. [WH]   1401 HS Troponin T(!): 29 [WH]   1401 Potassium(!): 3.2 [WH]   1402 Glucose(!): 158 [WH]   1402 BUN(!): 45 [WH]   1402 Creatinine(!): 1.37  1.81 three months ago [WH]   1402 Free T4(!): 1.80 [WH]   1402 TSH Baseline: 0.494 [WH]   1404 [id number] score is 4 [WH]   1418 Patient remains in A-fib.  Heart rate is around 110.  Blood pressure is now 101/72.  Test results and recommendation for admission were discussed with the patient and her .  She is agreeable to being admitted.  Case will be discussed with cardiology and the hospitalist. [WH]   1427 Case discussed with Dr. Sofia and he agrees to admit the patient to a monitored bed.  Pertinent history, exam findings, test results, ED course, and diagnoses were discussed with him. [WH]   1540 Case discussed with Dr. Echols, cardiology.  Pertinent history, exam findings, test results, ED course, and diagnoses were discussed with her.  She will see the patient in consult.  She recommends giving the patient a dose of Lovenox. [WH]   1602 Patient presented to the ED complaining of low blood pressure.  She was found to be in A-fib with RVR.   She was initially hypotensive but blood pressure and heart rate improved with IV fluids.  She was found to have acute kidney injury.  This is likely due to to decreased p.o. intake as the patient recently had pharyngitis and has not been drinking much.  Her white blood cell count was elevated.  This is likely due to stress reaction and recent steroid usage.  Initial troponin was indeterminate.  Patient denied chest pain.  She was hypokalemic and was given oral potassium.  Case was discussed with hospitalist and cardiologist.  Patient was given Lovenox.  Blood pressure remained stable while in the ED.  Heart rate improved to the 80s to 110s.  Critical care was performed on this patient. [WH]      ED Course User Index  [WH] Ervin Ricks MD               DIAGNOSIS  Final diagnoses:   New onset atrial fibrillation (HCC)   Atrial fibrillation with RVR (HCC)   Acute kidney injury (HCC)   Leukocytosis, unspecified type   Transient hypotension   Hypokalemia         DISPOSITION  ADMISSION    Discussed treatment plan and reason for admission with pt/family and admitting physician.  Pt/family voiced understanding of the plan for admission for further testing/treatment as needed.                 Latest Documented Vital Signs:  As of 16:05 EDT  BP- 106/83 HR- 87 Temp- 97.9 °F (36.6 °C) O2 sat- 93%              --    Please note that portions of this were completed with a voice recognition program.       Note Disclaimer: At [facility], we believe that sharing information builds trust and better relationships. You are receiving this note because you are receiving care at [facility] or recently visited. It is possible you will see health information before a provider has talked with you about it. This kind of information can be easy to misunderstand. To help you fully understand what it means for your health, we urge you to discuss this note with your provider.           Ervin Ricks MD  03/21/23 1850

## 2023-03-22 ENCOUNTER — APPOINTMENT (OUTPATIENT)
Dept: CARDIOLOGY | Facility: HOSPITAL | Age: 58
End: 2023-03-22
Payer: COMMERCIAL

## 2023-03-22 ENCOUNTER — READMISSION MANAGEMENT (OUTPATIENT)
Dept: CALL CENTER | Facility: HOSPITAL | Age: 58
End: 2023-03-22
Payer: COMMERCIAL

## 2023-03-22 VITALS
BODY MASS INDEX: 24.59 KG/M2 | HEIGHT: 66 IN | OXYGEN SATURATION: 97 % | DIASTOLIC BLOOD PRESSURE: 64 MMHG | RESPIRATION RATE: 16 BRPM | TEMPERATURE: 98 F | WEIGHT: 153 LBS | HEART RATE: 54 BPM | SYSTOLIC BLOOD PRESSURE: 102 MMHG

## 2023-03-22 LAB
ALBUMIN SERPL-MCNC: 3.2 G/DL (ref 3.5–5.2)
ALBUMIN/GLOB SERPL: 1.4 G/DL
ALP SERPL-CCNC: 64 U/L (ref 39–117)
ALT SERPL W P-5'-P-CCNC: 51 U/L (ref 1–33)
ANION GAP SERPL CALCULATED.3IONS-SCNC: 9 MMOL/L (ref 5–15)
AORTIC DIMENSIONLESS INDEX: 0.8 (DI)
ASCENDING AORTA: 3.1 CM
AST SERPL-CCNC: 30 U/L (ref 1–32)
BH CV ECHO MEAS - AO MAX PG: 9.4 MMHG
BH CV ECHO MEAS - AO MEAN PG: 5.4 MMHG
BH CV ECHO MEAS - AO V2 MAX: 153.1 CM/SEC
BH CV ECHO MEAS - AO V2 VTI: 29.4 CM
BH CV ECHO MEAS - AVA(I,D): 2.8 CM2
BH CV ECHO MEAS - EDV(CUBED): 82.9 ML
BH CV ECHO MEAS - EDV(MOD-SP2): 69 ML
BH CV ECHO MEAS - EDV(MOD-SP4): 69 ML
BH CV ECHO MEAS - EF(MOD-BP): 69.5 %
BH CV ECHO MEAS - EF(MOD-SP2): 69.6 %
BH CV ECHO MEAS - EF(MOD-SP4): 73.9 %
BH CV ECHO MEAS - ESV(CUBED): 10.1 ML
BH CV ECHO MEAS - ESV(MOD-SP2): 21 ML
BH CV ECHO MEAS - ESV(MOD-SP4): 18 ML
BH CV ECHO MEAS - FS: 50.3 %
BH CV ECHO MEAS - IVS/LVPW: 0.81 CM
BH CV ECHO MEAS - IVSD: 0.95 CM
BH CV ECHO MEAS - LAT PEAK E' VEL: 12.7 CM/SEC
BH CV ECHO MEAS - LV MASS(C)D: 157.6 GRAMS
BH CV ECHO MEAS - LV MAX PG: 6.2 MMHG
BH CV ECHO MEAS - LV MEAN PG: 3.6 MMHG
BH CV ECHO MEAS - LV V1 MAX: 124.2 CM/SEC
BH CV ECHO MEAS - LV V1 VTI: 24.7 CM
BH CV ECHO MEAS - LVIDD: 4.4 CM
BH CV ECHO MEAS - LVIDS: 2.16 CM
BH CV ECHO MEAS - LVOT AREA: 3.3 CM2
BH CV ECHO MEAS - LVOT DIAM: 2.06 CM
BH CV ECHO MEAS - LVPWD: 1.17 CM
BH CV ECHO MEAS - MED PEAK E' VEL: 6.4 CM/SEC
BH CV ECHO MEAS - MR MAX PG: 61.1 MMHG
BH CV ECHO MEAS - MR MAX VEL: 390.9 CM/SEC
BH CV ECHO MEAS - MV A DUR: 0.13 SEC
BH CV ECHO MEAS - MV A MAX VEL: 60 CM/SEC
BH CV ECHO MEAS - MV DEC SLOPE: 244.9 CM/SEC2
BH CV ECHO MEAS - MV DEC TIME: 0.26 MSEC
BH CV ECHO MEAS - MV E MAX VEL: 67.7 CM/SEC
BH CV ECHO MEAS - MV E/A: 1.13
BH CV ECHO MEAS - MV MAX PG: 3.1 MMHG
BH CV ECHO MEAS - MV MEAN PG: 1.13 MMHG
BH CV ECHO MEAS - MV P1/2T: 101.8 MSEC
BH CV ECHO MEAS - MV V2 VTI: 31.4 CM
BH CV ECHO MEAS - MVA(P1/2T): 2.16 CM2
BH CV ECHO MEAS - MVA(VTI): 2.6 CM2
BH CV ECHO MEAS - PA ACC TIME: 0.08 SEC
BH CV ECHO MEAS - PA PR(ACCEL): 42.2 MMHG
BH CV ECHO MEAS - PA V2 MAX: 83.4 CM/SEC
BH CV ECHO MEAS - PULM A REVS DUR: 0.11 SEC
BH CV ECHO MEAS - PULM A REVS VEL: 28.5 CM/SEC
BH CV ECHO MEAS - PULM DIAS VEL: 31.8 CM/SEC
BH CV ECHO MEAS - PULM S/D: 1.57
BH CV ECHO MEAS - PULM SYS VEL: 50 CM/SEC
BH CV ECHO MEAS - RV MAX PG: 1.61 MMHG
BH CV ECHO MEAS - RV V1 MAX: 63.4 CM/SEC
BH CV ECHO MEAS - RV V1 VTI: 12.6 CM
BH CV ECHO MEAS - SV(LVOT): 81.9 ML
BH CV ECHO MEAS - SV(MOD-SP2): 48 ML
BH CV ECHO MEAS - SV(MOD-SP4): 51 ML
BH CV ECHO MEAS - TAPSE (>1.6): 2.2 CM
BH CV ECHO MEASUREMENTS AVERAGE E/E' RATIO: 7.09
BH CV XLRA - RV BASE: 2.9 CM
BH CV XLRA - RV LENGTH: 7 CM
BH CV XLRA - RV MID: 2.7 CM
BH CV XLRA - TDI S': 12.5 CM/SEC
BILIRUB SERPL-MCNC: 0.4 MG/DL (ref 0–1.2)
BUN SERPL-MCNC: 32 MG/DL (ref 8–23)
BUN/CREAT SERPL: 43.8 (ref 7–25)
CALCIUM SPEC-SCNC: 8.1 MG/DL (ref 8.6–10.5)
CHLORIDE SERPL-SCNC: 103 MMOL/L (ref 98–107)
CO2 SERPL-SCNC: 27 MMOL/L (ref 22–29)
CREAT SERPL-MCNC: 0.73 MG/DL (ref 0.57–1)
CRP SERPL-MCNC: <0.3 MG/DL (ref 0–0.5)
D-LACTATE SERPL-SCNC: 2 MMOL/L (ref 0.5–2)
DEPRECATED RDW RBC AUTO: 43.1 FL (ref 37–54)
EGFRCR SERPLBLD CKD-EPI 2021: 94.3 ML/MIN/1.73
ERYTHROCYTE [DISTWIDTH] IN BLOOD BY AUTOMATED COUNT: 13 % (ref 12.3–15.4)
GLOBULIN UR ELPH-MCNC: 2.3 GM/DL
GLUCOSE BLDC GLUCOMTR-MCNC: 88 MG/DL (ref 70–130)
GLUCOSE BLDC GLUCOMTR-MCNC: 94 MG/DL (ref 70–130)
GLUCOSE SERPL-MCNC: 94 MG/DL (ref 65–99)
HBA1C MFR BLD: 5.9 % (ref 4.8–5.6)
HCT VFR BLD AUTO: 38.7 % (ref 34–46.6)
HGB BLD-MCNC: 13 G/DL (ref 12–15.9)
LEFT ATRIUM VOLUME INDEX: 30.3 ML/M2
LYMPHOCYTES # BLD MANUAL: 3.46 10*3/MM3 (ref 0.7–3.1)
LYMPHOCYTES NFR BLD MANUAL: 6 % (ref 5–12)
MAXIMAL PREDICTED HEART RATE: 160 BPM
MAXIMAL PREDICTED HEART RATE: 160 BPM
MCH RBC QN AUTO: 30.5 PG (ref 26.6–33)
MCHC RBC AUTO-ENTMCNC: 33.6 G/DL (ref 31.5–35.7)
MCV RBC AUTO: 90.8 FL (ref 79–97)
MONOCYTES # BLD: 1.04 10*3/MM3 (ref 0.1–0.9)
NEUTROPHILS # BLD AUTO: 12.82 10*3/MM3 (ref 1.7–7)
NEUTROPHILS NFR BLD MANUAL: 74 % (ref 42.7–76)
PLAT MORPH BLD: NORMAL
PLATELET # BLD AUTO: 237 10*3/MM3 (ref 140–450)
PMV BLD AUTO: 10.5 FL (ref 6–12)
POTASSIUM SERPL-SCNC: 3.2 MMOL/L (ref 3.5–5.2)
POTASSIUM SERPL-SCNC: 3.5 MMOL/L (ref 3.5–5.2)
PROCALCITONIN SERPL-MCNC: 0.05 NG/ML (ref 0–0.25)
PROT SERPL-MCNC: 5.5 G/DL (ref 6–8.5)
RBC # BLD AUTO: 4.26 10*6/MM3 (ref 3.77–5.28)
RBC MORPH BLD: NORMAL
SODIUM SERPL-SCNC: 139 MMOL/L (ref 136–145)
STRESS TARGET HR: 136 BPM
STRESS TARGET HR: 136 BPM
VARIANT LYMPHS NFR BLD MANUAL: 20 % (ref 19.6–45.3)
WBC MORPH BLD: NORMAL
WBC NRBC COR # BLD: 17.32 10*3/MM3 (ref 3.4–10.8)

## 2023-03-22 PROCEDURE — 82962 GLUCOSE BLOOD TEST: CPT

## 2023-03-22 PROCEDURE — 83605 ASSAY OF LACTIC ACID: CPT | Performed by: INTERNAL MEDICINE

## 2023-03-22 PROCEDURE — 93306 TTE W/DOPPLER COMPLETE: CPT

## 2023-03-22 PROCEDURE — 99222 1ST HOSP IP/OBS MODERATE 55: CPT | Performed by: INTERNAL MEDICINE

## 2023-03-22 PROCEDURE — 84132 ASSAY OF SERUM POTASSIUM: CPT | Performed by: HOSPITALIST

## 2023-03-22 PROCEDURE — 96361 HYDRATE IV INFUSION ADD-ON: CPT

## 2023-03-22 PROCEDURE — G0378 HOSPITAL OBSERVATION PER HR: HCPCS

## 2023-03-22 PROCEDURE — 80053 COMPREHEN METABOLIC PANEL: CPT | Performed by: INTERNAL MEDICINE

## 2023-03-22 PROCEDURE — 93306 TTE W/DOPPLER COMPLETE: CPT | Performed by: INTERNAL MEDICINE

## 2023-03-22 PROCEDURE — 84145 PROCALCITONIN (PCT): CPT | Performed by: HOSPITALIST

## 2023-03-22 PROCEDURE — 83036 HEMOGLOBIN GLYCOSYLATED A1C: CPT | Performed by: INTERNAL MEDICINE

## 2023-03-22 PROCEDURE — 85025 COMPLETE CBC W/AUTO DIFF WBC: CPT | Performed by: INTERNAL MEDICINE

## 2023-03-22 PROCEDURE — 85007 BL SMEAR W/DIFF WBC COUNT: CPT | Performed by: INTERNAL MEDICINE

## 2023-03-22 PROCEDURE — 86140 C-REACTIVE PROTEIN: CPT | Performed by: HOSPITALIST

## 2023-03-22 PROCEDURE — 93246 EXT ECG>7D<15D RECORDING: CPT

## 2023-03-22 RX ADMIN — SODIUM CHLORIDE 125 ML/HR: 9 INJECTION, SOLUTION INTRAVENOUS at 05:38

## 2023-03-22 RX ADMIN — Medication 10 ML: at 08:58

## 2023-03-22 NOTE — DISCHARGE SUMMARY
[facility]  [phone]    DISCHARGE SUMMARY  [facility]    Patient Identification:  Name: Emy Medina  Age: 58 y.o.  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  She was treated with anticoagulation therapy until May of that year.  She has done well since until 2022 and was found to have acute CVA for which reason was unclear.  She has been on follow-up with neurology and cardiology service since.  In this background around beginning of this months around 3/2 or 3/3/2023 she started developing sore throat difficulty swallowing and congestion and discomfort in her right ear.  She better with the symptoms for 5 days and eventually saw her primary care provider on 3/7/2023 and was diagnosed with right ear infection and pharyngitis and was given Keflex.  Her symptoms did not improve and she continued to decline with increasing pain on swallowing and sore throat so she went to immediate care center on 3/12/2023.  She does recall 3 days prior to her symptoms she was exposed to COVID-19.  She was evaluated in the immediate care center and was noted to have negative COVID-19 assay.  She was prescribed Augmentin with instructions to follow-up with her primary care provider.  She took 1 dose of Augmentin and developed significant diarrhea and abdominal cramps.  Patient  went to see her primary care provider on 3/13/2023 and while at the doctor's office as she was being roomed she became very lightheaded and dizzy.  Because of this change in status patient was sent to [facility] emergency room and was subsequently hospitalized and was in the hospital from 3/13/2023 through 3/16/2023 and was discharged on Avelox and prednisone.  She was supposed to take her last dose of Avelox today and overall felt better since her release from the hospital till this morning.  She drove herself to work feeling otherwise fine and while she was at the workplace she started feeling dizzy and cold and clammy with hot spells and felt very weak.  Eventually she was evaluated by school nurse and was noted to have low blood pressure and recorded as 70/50.  Her  was called and patient was brought to the emergency room.  She was also feeling palpitation and heart fluttering.  Upon arrival to the emergency room patient was noted to have atrial fibrillation with rapid ventricular response which is a new finding for her.  She was also noted to have white blood cell count of 21,000 troponin of 29 and creatinine of 1.37 and potassium 3.2.  Patient received IV fluids and Lovenox with improvement in heart rate and blood pressure and after discussion with cardiology service patient being admitted for further care.    Hospital Course:     Patient was admitted to the hospital as noted she had atrial fibrillation.  She was hypotensive on admission.  She is on multiple blood pressure medications at home but she often times will skip blood pressure medications if her blood pressure is too low.  Overnight she is back in sinus rhythm and her blood pressures much improved.  She is just on her Coreg.  She is currently holding on her hydralazine, HCTZ, valsartan.    Patient's potassium today is low so this has been replaced.    She has been seen in consultation by cardiology who is okay with the patient being  discharged today.  For now the patient will be kept off of anticoagulation but if the atrial fibrillation returns consideration will need to be given.  Patient is well-known to Dr. Rose.     Patient has elevated white blood cell count of 21,000 and today is lower at 17 but her procalcitonin is normal and CRP is normal.  She does complain of some night sweats but with a normal CRP and Pro-Clinton my suspicion for bacteremia is low.  And the patient looks and feels well today.    Because of her hypotension on admission a D-dimer was obtained and the D-dimer was just minimally elevated at 0.63.  She underwent a CT angiogram which was unremarkable for pulmonary embolism.    Patient had mild acute kidney injury with creatinine on admission of 1.37 but its better this morning this is related to her hypotension that brought her in.    The patient was seen and examined on the day of discharge.    Consults:   Consults     Date and Time Order Name Status Description    3/21/2023  9:24 PM Inpatient Cardiology Consult Completed     3/21/2023  2:15 PM [facility] (on-call MD unless specified) Details      3/21/2023  2:05 PM [facility] (on-call MD unless specified)            Results from last 7 days   Lab Units 03/22/23  0416   WBC 10*3/mm3 17.32*   HEMOGLOBIN g/dL 13.0   HEMATOCRIT % 38.7   PLATELETS 10*3/mm3 237       Results from last 7 days   Lab Units 03/22/23  0416   SODIUM mmol/L 139   POTASSIUM mmol/L 3.2*   CHLORIDE mmol/L 103   CO2 mmol/L 27.0   BUN mg/dL 32*   CREATININE mg/dL 0.73   GLUCOSE mg/dL 94   CALCIUM mg/dL 8.1*       Significant Diagnostic Studies:   WBC   Date Value Ref Range Status   03/22/2023 17.32 (H) 3.40 - 10.80 10*3/mm3 Final     Hemoglobin   Date Value Ref Range Status   03/22/2023 13.0 12.0 - 15.9 g/dL Final     Hematocrit   Date Value Ref Range Status   03/22/2023 38.7 34.0 - 46.6 % Final     Platelets   Date Value Ref Range Status   03/22/2023 237 140 - 450 10*3/mm3 Final     Sodium   Date Value Ref Range Status    03/22/2023 139 136 - 145 mmol/L Final     Potassium   Date Value Ref Range Status   03/22/2023 3.2 (L) 3.5 - 5.2 mmol/L Final     Chloride   Date Value Ref Range Status   03/22/2023 103 98 - 107 mmol/L Final     CO2   Date Value Ref Range Status   03/22/2023 27.0 22.0 - 29.0 mmol/L Final     BUN   Date Value Ref Range Status   03/22/2023 32 (H) 8 - 23 mg/dL Final     Creatinine   Date Value Ref Range Status   03/22/2023 0.73 0.57 - 1.00 mg/dL Final     Glucose   Date Value Ref Range Status   03/22/2023 94 65 - 99 mg/dL Final     Calcium   Date Value Ref Range Status   03/22/2023 8.1 (L) 8.6 - 10.5 mg/dL Final     Magnesium   Date Value Ref Range Status   03/21/2023 2.2 1.6 - 2.4 mg/dL Final     AST (SGOT)   Date Value Ref Range Status   03/22/2023 30 1 - 32 U/L Final     ALT (SGPT)   Date Value Ref Range Status   03/22/2023 51 (H) 1 - 33 U/L Final     Alkaline Phosphatase   Date Value Ref Range Status   03/22/2023 64 39 - 117 U/L Final     No results found for: APTT, INR  No results found for: COLORU, CLARITYU, SPECGRAV, PHUR, PROTEINUR, GLUCOSEU, KETONESU, BLOODU, NITRITE, LEUKOCYTESUR, BILIRUBINUR, UROBILINOGEN, RBCUA, WBCUA, BACTERIA, UACOMMENT  HS Troponin T   Date Value Ref Range Status   03/21/2023 29 (H) <10 ng/L Final     No components found for: HGBA1C;2  No components found for: TSH;2    Imaging Results (All)     Procedure Component Value Units Date/Time    CT Angiogram Chest [[id number]] Collected: 03/21/23 2354     Updated: 03/22/23 0004    Narrative:      CT ANGIOGRAM OF THE CHEST     HISTORY: Hypotension. Chest tightness. Patient has a history of  pulmonary embolus     COMPARISON: 12/08/2022     TECHNIQUE: Axial CT imaging was obtained through the thorax. IV contrast  was administered. Three-D reformatted images were obtained.     FINDINGS:  No acute pulmonary thromboembolus is seen. Thoracic aorta is normal in  caliber. Examination was not optimized for assessment of the aorta. No  obvious dissection  is seen. The thyroid gland and trachea, and esophagus  appear unremarkable. There are coronary artery calcifications. A 2 mm  subpleural nodule within the right upper lobe has been stable since  2018, and is benign. There is biapical scarring. There is additional  scarring noted at the lung bases. Tiny subpleural nodules at the right  lung base are also unchanged. Mediastinal lymph nodes do not appear  pathologically enlarged. There is a duodenal diverticulum. There is  colonic diverticulosis. No acute abnormalities are noted in the upper  abdomen. There is compression deformity which is noted at T12, stable  when compared to December 2022.       Impression:      No acute intrathoracic findings.     Radiation dose reduction techniques were utilized, including automated  exposure control and exposure modulation based on body size.     This report was finalized on 3/22/2023 12:01 AM by Dr. Heather Patel M.D.       XR Chest 1 View [[id number]] Collected: 03/21/23 1314     Updated: 03/21/23 1318    Narrative:      XR CHEST 1 VW-     HISTORY: Female who is 58 years-old,  chest pain     TECHNIQUE: Frontal view of the chest     COMPARISON: 12/08/2022     FINDINGS: Heart size is normal. Aorta is tortuous. Pulmonary vasculature  is unremarkable. No focal pulmonary consolidation, pleural effusion, or  pneumothorax. No acute osseous process.       Impression:      No focal pulmonary consolidation. Tortuous aorta. Follow-up  as clinically indicated.     This report was finalized on 3/21/2023 1:15 PM by Dr. Mike Huynh M.D.         No results found for: SITE, ALLENTEST, PHART, QHL6ISG, PO2ART, MDP1ZML, BASEEXCESS, G9UXTUMR, HGBBG, HCTABG, OXYHEMOGLOBI, METHHGBN, CARBOXYHGB, CO2CT, BAROMETRIC, MODALITY, FIO2       Discharge Medications      Continue These Medications      Instructions Start Date   Aspirin Low Dose 81 MG chewable tablet  Generic drug: aspirin   81 mg, Oral, Daily      atorvastatin 40 MG  tablet  Commonly known as: LIPITOR   40 mg, Oral, Nightly      CALCIUM 1200 PO   Oral      carvedilol 12.5 MG tablet  Commonly known as: COREG   12.5 mg, Oral, 2 Times Daily      cloNIDine 0.1 MG tablet  Commonly known as: Catapres   0.1 mg, Oral, Daily PRN      gabapentin 100 MG capsule  Commonly known as: NEURONTIN   100mg in am and 100mg mid-day and 200mg at night      predniSONE 20 MG tablet  Commonly known as: DELTASONE   No dose, route, or frequency recorded.      Vitamin D3 10 MCG (400 UNIT) capsule   Every 24 Hours         Stop These Medications    cephalexin 500 MG capsule  Commonly known as: KEFLEX     ciprofloxacin 500 MG tablet  Commonly known as: CIPRO     diclofenac 50 MG EC tablet  Commonly known as: VOLTAREN     hydrALAZINE 25 MG tablet  Commonly known as: APRESOLINE     hydroCHLOROthiazide 25 MG tablet  Commonly known as: HYDRODIURIL     valsartan 160 MG tablet  Commonly known as: DIOVAN              Patient Instructions:       Future Appointments   Date Time Provider Department Center   4/14/2023  3:15 PM Leighann Echols MD [facility]   5/17/2023  3:00 PM Samantha Cardenas APRN [facility]         Follow-up Information     Clem Bush MD .    Specialty: Family Medicine  Contact information:  [address]  [place] [id number] [phone]                         Discharge Order (From admission, onward)     Start     Ordered    03/22/23 1319  Discharge patient  Once        Comments: If potassium is ok   Expected Discharge Date: 03/22/23    Expected Discharge Time: Evening    Discharge Disposition: Home or Self Care    Physician of Record for Attribution - Please select from Treatment Team: MIRTHA JACKSON [[id number]]    Review needed by CMO to determine Physician of Record: No       Question Answer Comment   Physician of Record for Attribution - Please select from Treatment Team MIRTHA JACKSON    Review needed by CMO to determine Physician of Record No         03/22/23 1339                Diet Order   Procedures   • Diet: Cardiac Diets, Diabetic Diets; Healthy Heart (2-3 Na+); Consistent Carbohydrate; Texture: Regular Texture (IDDSI 7); Fluid Consistency: Thin (IDDSI 0)       TEST  RESULTS PENDING AT DISCHARGE  Pending Labs     Order Current Status    Potassium Collected (03/22/23 1434)              Total time spent discharging patient including evaluation, post hospitalization follow up,  medication and post hospitalization instructions and education, total time exceeds 30 minutes.    Signed:  Alfonso Giron MD  3/22/2023  16:26 EDT

## 2023-03-22 NOTE — PLAN OF CARE
Goal Outcome Evaluation:  Plan of Care Reviewed With: patient           Outcome Evaluation: Pt admitted for new onset afib. Remains in Afib with HR in the 40s and 50s. Pt states her HR is baseline in the 40s-50s. BP low at begining of shift, orders given for NS at 125 per Dr. Sofia. D-dimer elevated, CTA performed to r/o PE. Pt reports no pain, dizziness, or lightheadedness. Cardiology consulted. Echo ordered. AOx4. RA. AM labs pending,

## 2023-03-22 NOTE — CONSULTS
Patient Name: Emy Medina  :1965  58 y.o.    Date of Admission: 3/21/2023  Date of Consultation:  23  Encounter Provider: Leighann Echols MD  Place of Service: [facility]  Referring Provider: Jesse Sofia MD  Patient Care Team:  Clem Bush MD as PCP - General  Clem Bush MD as PCP - Family Medicine      Chief complaint: A-fib new    History of Present Illness:  This is a pleasant 58-year-old woman who I know well.  He has a history of hypertension and lacunar stroke.    When I met her in 2022 she had recently been in the emergency with chest pain.  At that time she was severely hypertensive.  We had been working on her blood pressure and had actually pretty well controlled. Then in 2022 she noted some unilateral weakness in her arm and leg.  She waited a few days but eventually presented to the emergency room.  At that time she was found to have a lacunar stroke.    In  she presented to the emergency room with chest pain.  Blood pressure again was elevated.  We added hydrochlorothiazide and blood pressure is improved.  Given the ongoing pain we had her perform a treadmill stress test.  She up to 75% of her target heart rate without any EKG changes or recurrence of pain symptoms.    Blood pressures been well controlled since I last saw her in December.  About 2 weeks ago she was spiking fevers and ultimately was diagnosed with acute pharyngitis and was hospitalized at [facility] for several days.  She is been treated with antibiotics and a steroid taper.  Due to her sore throat she was unable to take much by p.o.  Yesterday she had a rapid heart rate and felt uncomfortable in the chest.  She came to the emergency room was found to be in A-fib with a heart rate of 130s.  Her creatinine was mildly elevated at 1.4, well above her baseline.  She has been treated aggressively with IV hydration as well and has a  therapeutic dose of Lovenox.  Overnight she converted spontaneously to sinus rhythm.  This morning she is feeling well.  Her only complaint is bilateral knee pain upon standing.  She has no pleuritic pain or no ongoing chest pain.  Her renal function is normalized.  Her initial troponin was 29.    She works in the cafeteria of a school.  She is .  She has never smoked.  She does not drink alcohol.  Her history of PE was back in 2016 which occurred after hysterectomy.  She was treated with anticoagulation for 6 months.      Past Medical History:   Diagnosis Date   • Arthritis    • Hypertension    • PE (pulmonary embolism)    • Pleurisy        Past Surgical History:   Procedure Laterality Date   • CATARACT EXTRACTION     • HYSTERECTOMY     • LASIK     • SINUS SURGERY           Prior to Admission medications    Medication Sig Start Date End Date Taking? Authorizing Provider   diclofenac (VOLTAREN) 50 MG EC tablet Take 1 tablet by mouth. 2/21/23  Yes Mike Deal MD   aspirin 81 MG chewable tablet Chew 1 tablet Daily. 8/5/22   Flavio Ramírez MD   atorvastatin (LIPITOR) 40 MG tablet Take 1 tablet by mouth Every Night. 8/4/22   Flavio Ramírez MD   Calcium Carbonate-Vit D-Min (CALCIUM 1200 PO) Take  by mouth.    ProviderMike MD   carvedilol (COREG) 12.5 MG tablet Take 1 tablet by mouth 2 (Two) Times a Day. 11/14/22   Leighann Echols MD   cephalexin (KEFLEX) 500 MG capsule Take 1 capsule by mouth Every 12 (Twelve) Hours. 3/7/23   Mike Deal MD   Cholecalciferol (Vitamin D3) 10 MCG (400 UNIT) capsule Daily.    Mike Deal MD   ciprofloxacin (CIPRO) 500 MG tablet Take 1 tablet by mouth Every 12 (Twelve) Hours. 3/13/23   Mike Deal MD   cloNIDine (Catapres) 0.1 MG tablet Take 1 tablet by mouth Daily As Needed for High Blood Pressure (for BP that is persistently elevated >200/100). 12/9/22   Helen Gannon PA-C   gabapentin (NEURONTIN) 100 MG capsule  100mg in am and 100mg mid-day and 200mg at night 2/14/23   Samantha Cardenas APRN   hydrALAZINE (APRESOLINE) 25 MG tablet Take 1 tablet by mouth 3 (Three) Times a Day. 11/29/22   Leighann Echols MD   hydroCHLOROthiazide (HYDRODIURIL) 25 MG tablet Take 1 tablet by mouth Daily. 12/29/22   Leighann Echols MD   predniSONE (DELTASONE) 20 MG tablet  3/16/23   Mike Deal MD   valsartan (DIOVAN) 160 MG tablet Take 1 tablet by mouth 2 (Two) Times a Day. 8/19/22   Leighann Echols MD       Allergies   Allergen Reactions   • Doxycycline    • Sulfa Antibiotics Hives       Social History     Socioeconomic History   • Marital status:    Tobacco Use   • Smoking status: Never   • Smokeless tobacco: Never   Vaping Use   • Vaping Use: Never used   Substance and Sexual Activity   • Alcohol use: No   • Drug use: No   • Sexual activity: Yes     Partners: Male       Family History   Problem Relation Age of Onset   • Stroke Mother    • Hypertension Mother    • Multiple sclerosis Father        REVIEW OF SYSTEMS:   All systems reviewed.  Pertinent positives identified in HPI.  All other systems are negative.      Objective:     Vitals:    03/21/23 2231 03/21/23 2356 03/22/23 0712 03/22/23 0839   BP: 101/60 93/67  106/57   BP Location: Right arm Left arm Left arm Left arm   Patient Position: Lying Lying Lying Lying   Pulse: 57 51 52    Resp:  16 16    Temp:  97.7 °F (36.5 °C) 98.6 °F (37 °C)    TempSrc:  Oral Oral    SpO2:  96% 94%    Weight:       Height:         Body mass index is 24.82 kg/m².    General Appearance:    Alert, cooperative, in no acute distress   Head:    Normocephalic, without obvious abnormality, atraumatic   Eyes:            Lids and lashes normal, conjunctivae and sclerae normal, no icterus, no pallor, corneas clear, PERRLA   Ears:    Ears appear intact with no abnormalities noted   Throat:   No oral lesions, no thrush, oral mucosa moist   Neck:   No adenopathy, supple, trachea midline, no thyromegaly, no  carotid bruit, no JVD   Back:     No kyphosis present, no scoliosis present, no skin lesions, erythema or scars, no tenderness to percussion or palpation, range of motion normal   Lungs:     Clear to auscultation, respirations regular, even and unlabored    Heart:    Regular rhythm and normal rate, normal S1 and S2, no murmur, no gallop, no rub, no click   Chest Wall:    No abnormalities observed   Abdomen:     Normal bowel sounds, no masses, no organomegaly, soft, nontender, nondistended, no guarding, no rebound  tenderness   Extremities:   Moves all extremities well, no edema, no cyanosis, no redness   Pulses:   Pulses palpable and equal bilaterally. Normal radial, carotid, femoral, dorsalis pedis and posterior tibial pulses bilaterally. Normal abdominal aorta   Skin:  Psychiatric:   No bleeding, bruising or rash    Alert and oriented x 3, normal mood and affect   Lab Review:     Results from last 7 days   Lab Units 03/22/23  0416   SODIUM mmol/L 139   POTASSIUM mmol/L 3.2*   CHLORIDE mmol/L 103   CO2 mmol/L 27.0   BUN mg/dL 32*   CREATININE mg/dL 0.73   CALCIUM mg/dL 8.1*   BILIRUBIN mg/dL 0.4   ALK PHOS U/L 64   ALT (SGPT) U/L 51*   AST (SGOT) U/L 30   GLUCOSE mg/dL 94     Results from last 7 days   Lab Units 03/21/23  1256   HSTROP T ng/L 29*     Results from last 7 days   Lab Units 03/22/23  0416   WBC 10*3/mm3 17.32*   HEMOGLOBIN g/dL 13.0   HEMATOCRIT % 38.7   PLATELETS 10*3/mm3 237         Results from last 7 days   Lab Units 03/21/23  1256   MAGNESIUM mg/dL 2.2                   I personally viewed and interpreted the patient's EKG/Telemetry data.        Assessment and Plan:       1.  Atrial fibrillation: New.  Most likely prompted by her acute pharyngitis and hypovolemia.  Now spontaneously converted back to normal sinus rhythm.  I would continue her usual beta-blocker dose of Coreg 12.5 twice daily as she is borderline bradycardic at rest anyway.  Given that she has 2 triggering factors in her acute  pharyngitis and hypovolemia, I will hold off on anticoagulating her.  When it is time to discharge from the hospital she will go home with a 2-week ZIO monitor.  If she has any recurrence of atrial fibrillation I would have a low threshold to start anticoagulation given her CHADSVASC score of 4.  2.  Elevated troponin: Demand related to tachycardia and abnormal renal function  3.  FUENTES: Resolved  4.  Acute pharyngitis    Okay for discharge from a cardiac perspective.  I will see her in the office in 4 to 6 weeks.  If she is to stay in the hospital then we will continue to monitor her on telemetry      Leighann Echols MD  03/22/23  12:15 EDT

## 2023-03-22 NOTE — PLAN OF CARE
Problem: Adult Inpatient Plan of Care  Goal: Plan of Care Review  Outcome: Met  Flowsheets (Taken 3/22/2023 7699)  Progress: improving  Plan of Care Reviewed With: patient  Goal: Patient-Specific Goal (Individualized)  Outcome: Met  Goal: Absence of Hospital-Acquired Illness or Injury  Outcome: Met  Goal: Optimal Comfort and Wellbeing  Outcome: Met  Goal: Readiness for Transition of Care  Outcome: Met     Problem: Hypertension Comorbidity  Goal: Blood Pressure in Desired Range  Outcome: Met   Goal Outcome Evaluation:  Plan of Care Reviewed With: patient        Progress: improving

## 2023-03-23 NOTE — CASE MANAGEMENT/SOCIAL WORK
Case Management Discharge Note      Final Note: Home, no additional CCP needs. Boaz RN/CCP         Selected Continued Care - Discharged on 3/22/2023 Admission date: 3/21/2023 - Discharge disposition: Home or Self Care    Destination    No services have been selected for the patient.              Durable Medical Equipment    No services have been selected for the patient.              Dialysis/Infusion    No services have been selected for the patient.              Home Medical Care    No services have been selected for the patient.              Therapy    No services have been selected for the patient.              Community Resources    No services have been selected for the patient.              Community & DME    No services have been selected for the patient.                       Final Discharge Disposition Code: 01 - home or self-care

## 2023-03-23 NOTE — OUTREACH NOTE
Prep Survey    Flowsheet Row Responses   [facility] facility patient discharged from? [facility]   Is LACE score < 7 ? Yes   Eligibility Readm Mgmt   Discharge diagnosis New onset atrial fibrillation    Does the patient have one of the following disease processes/diagnoses(primary or secondary)? Other   Does the patient have Home health ordered? No   Is there a DME ordered? No   Prep survey completed? Yes          CAMACHO WATERS - Registered Nurse

## 2023-03-27 RX ORDER — HYDRALAZINE HYDROCHLORIDE 25 MG/1
TABLET, FILM COATED ORAL
Qty: 270 TABLET | Refills: 1 | Status: SHIPPED | OUTPATIENT
Start: 2023-03-27

## 2023-03-27 RX ORDER — VALSARTAN 160 MG/1
TABLET ORAL
Qty: 180 TABLET | Refills: 1 | Status: SHIPPED | OUTPATIENT
Start: 2023-03-27

## 2023-04-10 ENCOUNTER — TELEPHONE (OUTPATIENT)
Dept: CARDIOLOGY | Facility: CLINIC | Age: 58
End: 2023-04-10
Payer: COMMERCIAL

## 2023-04-10 RX ORDER — VALSARTAN 160 MG/1
80 TABLET ORAL
Qty: 180 TABLET | Refills: 1 | OUTPATIENT
Start: 2023-04-10

## 2023-04-10 RX ORDER — HYDROCHLOROTHIAZIDE 12.5 MG/1
12.5 TABLET ORAL DAILY
COMMUNITY

## 2023-04-10 NOTE — TELEPHONE ENCOUNTER
Spoke to patient about BP. Seems to be dropping after medications down into the 90's. I told her to take the following    Stop hydralazine  Decrease hctz to 12.5 mg daily  Coreg 12.5mg bid  Valsartan 80mg at bedtime    Hub staff attempted to follow warm transfer process and was unsuccessful     Caller: Emy Medina    Relationship to patient: Self    Best call back number: [phone]    Patient is needing: Saturday AND Sunday PT'S BP WAS PRETTY LOW HOWEVER TODAY HER BP IS /104. SHE WOULD LIKE A CALL BACK TO DISCUSS WHAT MEDICATIONS SHE SHOULD BE TAKING.

## 2023-04-14 ENCOUNTER — OFFICE VISIT (OUTPATIENT)
Dept: CARDIOLOGY | Facility: CLINIC | Age: 58
End: 2023-04-14
Payer: COMMERCIAL

## 2023-04-14 VITALS
HEART RATE: 56 BPM | SYSTOLIC BLOOD PRESSURE: 128 MMHG | WEIGHT: 157 LBS | HEIGHT: 66 IN | DIASTOLIC BLOOD PRESSURE: 92 MMHG | BODY MASS INDEX: 25.23 KG/M2

## 2023-04-14 DIAGNOSIS — I10 PRIMARY HYPERTENSION: Primary | ICD-10-CM

## 2023-04-14 LAB
MAXIMAL PREDICTED HEART RATE: 160 BPM
STRESS TARGET HR: 136 BPM

## 2023-04-14 PROCEDURE — 99213 OFFICE O/P EST LOW 20 MIN: CPT | Performed by: INTERNAL MEDICINE

## 2023-04-14 NOTE — PROGRESS NOTES
Subjective:     Encounter Date: 04/14/23      Patient ID: Emy Medina is a 58 y.o. female.    Chief Complaint: Hypertension  HPI:   This is a 58-year-old woman with hypertension, PE, CVA.  When I met her in April 2022 she had recently been in the emergency with chest pain.  At that time she was severely hypertensive.  We had been working on her blood pressure and had actually pretty well controlled. Then in August 2022 she noted some unilateral weakness in her arm and leg.  She waited a few days but eventually presented to the emergency room.  At that time she was found to have a lacunar stroke.      She was recently admitted with dehydrationDue to decreased p.o. intake in the setting of acute pharyngitis.  She was in atrial fibrillation when she arrived to the hospital but spontaneously converted.  She is not anticoagulated given the acute illness.  Since leaving the hospital she has felt well.  She had some fluctuations in her blood pressure and the medications have been adjusted.  No palpitations or tachycardia as far she can tell.      She works in the cafeteria of a school.  She is .  She has never smoked.  She does not drink alcohol.  Her history of PE was back in 2016 which occurred after hysterectomy.  She was treated with anticoagulation for 6 months.  The following portions of the patient's history were reviewed and updated as appropriate: allergies, current medications, past family history, past medical history, past social history, past surgical history and problem list.     REVIEW OF SYSTEMS:   All systems reviewed.  Pertinent positives identified in HPI.  All other systems are negative.    Past Medical History:   Diagnosis Date   • Arthritis    • Hypertension    • PE (pulmonary embolism)    • Pleurisy        Family History   Problem Relation Age of Onset   • Stroke Mother    • Hypertension Mother    • Multiple sclerosis Father        Social History     Socioeconomic History   • Marital  status:    Tobacco Use   • Smoking status: Never   • Smokeless tobacco: Never   Vaping Use   • Vaping Use: Never used   Substance and Sexual Activity   • Alcohol use: No   • Drug use: No   • Sexual activity: Yes     Partners: Male       Allergies   Allergen Reactions   • Amoxicillin-Pot Clavulanate Diarrhea     Stomach cramping    • Doxycycline    • Sulfa Antibiotics Hives       Past Surgical History:   Procedure Laterality Date   • CATARACT EXTRACTION     • HYSTERECTOMY     • LASIK     • SINUS SURGERY         Procedures       Objective:         PHYSICAL EXAM:  GEN: VSS, no distress,   Eyes: normal sclera, normal lids and lashes  HENT: moist mucus membranes,   Respiratory: CTAB, no rales or wheezes  CV: RRR, no murmurs, , +2 DP and 2+ carotid pulses b/l  GI: NABS, soft,  Nontender, nondistended  MSK: no edema, no scoliosis or kyphosis  Skin: no rash, warm, dry  Heme/Lymph: no bruising or bleeding  Psych: organized thought, normal behavior and affect  Neuro: Cranial nerves grossly intact, Alert and Oriented x 3.         Assessment:          Diagnosis Plan   1. Primary hypertension             Plan:       1.  Hypertension: Coreg 12.5 twice daily.  She does not tolerate higher doses due to bradycardia.  Valsartan 80 nightly.  HCTZ 12.5 daily.    2.  Atrial fibrillation: Provoked by acute pharyngitis/volume depletion.  2-week monitor is pending.  No anticoagulation at this point.  3.  History of PE 2016 postoperative hysterectomy  4.  CVA August 2022: Aspirin, Lipitor 40, blood pressure troll    Dr. Bush, thank you very much for referring this kind patient to me. Please call me with any questions or concerns. I will see the patient again in the office in 6months.          Leighann Echols MD  04/14/23  [facility]    Outpatient Encounter Medications as of 4/14/2023   Medication Sig Dispense Refill   • aspirin 81 MG chewable tablet Chew 1 tablet Daily. 90 tablet 0   • atorvastatin (LIPITOR) 40 MG  tablet Take 1 tablet by mouth Every Night. 30 tablet 0   • Calcium Carbonate-Vit D-Min (CALCIUM 1200 PO) Take  by mouth.     • carvedilol (COREG) 12.5 MG tablet Take 1 tablet by mouth 2 (Two) Times a Day. 180 tablet 3   • Cholecalciferol (Vitamin D3) 10 MCG (400 UNIT) capsule Daily.     • cloNIDine (Catapres) 0.1 MG tablet Take 1 tablet by mouth Daily As Needed for High Blood Pressure (for BP that is persistently elevated >200/100). 30 tablet 0   • hydroCHLOROthiazide (HYDRODIURIL) 12.5 MG tablet Take 1 tablet by mouth Daily. Pt takes 1/2 tab qam     • valsartan (DIOVAN) 160 MG tablet Take 0.5 tablets by mouth every night at bedtime. 180 tablet 1   • [DISCONTINUED] gabapentin (NEURONTIN) 100 MG capsule 100mg in am and 100mg mid-day and 200mg at night 120 capsule 2   • [DISCONTINUED] predniSONE (DELTASONE) 20 MG tablet        No facility-administered encounter medications on file as of 4/14/2023.

## 2023-05-17 ENCOUNTER — OFFICE VISIT (OUTPATIENT)
Dept: NEUROLOGY | Facility: CLINIC | Age: 58
End: 2023-05-17
Payer: COMMERCIAL

## 2023-05-17 VITALS
DIASTOLIC BLOOD PRESSURE: 70 MMHG | OXYGEN SATURATION: 95 % | BODY MASS INDEX: 25.46 KG/M2 | WEIGHT: 158.4 LBS | HEART RATE: 72 BPM | SYSTOLIC BLOOD PRESSURE: 120 MMHG | HEIGHT: 66 IN

## 2023-05-17 DIAGNOSIS — Z86.73 HISTORY OF STROKE: Primary | ICD-10-CM

## 2023-05-17 DIAGNOSIS — Z86.711 HISTORY OF PULMONARY EMBOLISM: ICD-10-CM

## 2023-05-17 DIAGNOSIS — Z91.89 AT RISK FOR OBSTRUCTIVE SLEEP APNEA: ICD-10-CM

## 2023-05-17 DIAGNOSIS — I48.0 PAF (PAROXYSMAL ATRIAL FIBRILLATION): ICD-10-CM

## 2023-05-17 DIAGNOSIS — M54.50 LOW BACK PAIN WITHOUT SCIATICA, UNSPECIFIED BACK PAIN LATERALITY, UNSPECIFIED CHRONICITY: ICD-10-CM

## 2023-05-17 RX ORDER — OMEPRAZOLE 20 MG/1
CAPSULE, DELAYED RELEASE ORAL
COMMUNITY
Start: 2023-04-13

## 2023-05-17 RX ORDER — AZELASTINE HYDROCHLORIDE 0.5 MG/ML
SOLUTION/ DROPS OPHTHALMIC
COMMUNITY
Start: 2023-04-19

## 2023-05-17 NOTE — LETTER
May 17, 2023       No Recipients    Patient: Emy Medina   YOB: 1965   Date of Visit: 2023       Dear Dr. Calix Recipients:    Thank you for referring Emy Medina to me for evaluation. Below are the relevant portions of my assessment and plan of care.    If you have questions, please do not hesitate to call me. I look forward to following Emy along with you.         Sincerely,        ELA Tong        CC:   No Recipients    Samantha Cardenas APRN  [id number]  Signed  DOS: 2023  NAME: Emy Medina   : 1965  PCP: Clem Bush MD    Chief Complaint   Patient presents with   • Stroke      Patient is accompanied by her  assists with providing some portion of medical history.    Neurological Problem and Interval History:  58 y.o. right-handed female with a known history of hypertension, pulmonary embolism (previously on anticoagulation) I am seeing today in follow-up from hospitalization for left maribel stroke-etiology felt to be due to uncontrolled risk factors although patient has recently had episode of paroxysmal atrial fibrillation felt to be provoked by acute pharyngitis/volume depletion- anticoagulation not felt to be warranted per cardiology.  Long-term Holter since completed which showed 2 episodes of V. tach, 40 episodes of SVT.  Patient reports that she has also had a very large nasal polyp which became unattached on its own-she also had some ear pain and swollen lymph nodes with throat swelling/difficulty swallowing at the same time-she was placed on Augmentin and Keflex.  At previous appointment recommended outpatient MEGAN evaluation which patient has not yet completed.  Also recommended consideration of hypercoag panel given history of pulmonary embolism which I would like to proceed with and patient is agreeable to as her mother had a clotting disorder although after further discussion it sounds like her mother had A-fib.  I will plan to see  "patient back in 4 to 8 months.       Current Outpatient Medications:   •  aspirin 81 MG chewable tablet, Chew 1 tablet Daily., Disp: 90 tablet, Rfl: 0  •  atorvastatin (LIPITOR) 40 MG tablet, Take 1 tablet by mouth Every Night., Disp: 30 tablet, Rfl: 0  •  azelastine (OPTIVAR) 0.05 % ophthalmic solution, instill 1 drop into both eyes twice a day, Disp: , Rfl:   •  Calcium Carbonate-Vit D-Min (CALCIUM 1200 PO), Take  by mouth., Disp: , Rfl:   •  carvedilol (COREG) 12.5 MG tablet, Take 1 tablet by mouth 2 (Two) Times a Day., Disp: 180 tablet, Rfl: 3  •  Cholecalciferol (Vitamin D3) 10 MCG (400 UNIT) capsule, Daily., Disp: , Rfl:   •  cloNIDine (Catapres) 0.1 MG tablet, Take 1 tablet by mouth Daily As Needed for High Blood Pressure (for BP that is persistently elevated >200/100)., Disp: 30 tablet, Rfl: 0  •  diclofenac (VOLTAREN) 50 MG EC tablet, Take 1 tablet by mouth Every 12 (Twelve) Hours., Disp: , Rfl:   •  hydroCHLOROthiazide (HYDRODIURIL) 12.5 MG tablet, Take 1 tablet by mouth Daily. Pt takes 1/2 tab qam, Disp: , Rfl:   •  omeprazole (priLOSEC) 20 MG capsule, TAKE 1 TABLET DAILY 1 HR BEFORE EVENING MEAL., Disp: , Rfl:   •  valsartan (DIOVAN) 160 MG tablet, Take 0.5 tablets by mouth every night at bedtime., Disp: 180 tablet, Rfl: 1    \"The following portions of the patient's history were reviewed and updated as appropriate: allergies, current medications, past family history, past medical history, past social history, past surgical history and problem list.\"  Review and Interpretation of Imaging:  Reviewed no new imaging  Laboratory Results:             Lab Results   Component Value Date    HGBA1C 5.90 (H) 03/22/2023         Lab Results   Component Value Date    CHOL 89 12/08/2022    CHOL 127 08/03/2022         Lab Results   Component Value Date    HDL 35 (L) 12/08/2022    HDL 27 (L) 08/03/2022    HDL 33 (L) 06/04/2020         Lab Results   Component Value Date    LDL 37 12/08/2022    LDL 77 08/03/2022     " 06/04/2020         Lab Results   Component Value Date    TRIG 82 12/08/2022    TRIG 128 08/03/2022    TRIG 138 06/04/2020     No results found for: RPR  Lab Results   Component Value Date    TSH 0.494 03/21/2023     No results found for: [id number]    Physical Examination: NIHSS: 0 mRS: 0  General Appearance:   Well developed, well nourished, well groomed, alert, and cooperative.  HEENT: Normocephalic.    Neck and Spine: Normal range of motion.  Normal alignment. No mass or tenderness. No bruits.  Cardiac: Regular rate and rhythm. No murmurs.  Peripheral Vasculature: Radial and pedal pulses are equal and symmetric.   Extremities:    No edema or deformities. Normal joint ROM.  Skin:    No rashes or birth marks.    Neurological examination:  Higher Integrative  Function: Oriented to time, place and person. Normal registration, recall, attention span and concentration. Normal language including comprehension, spontaneous speech, repetition, reading, writing, naming and vocabulary. No neglect with normal visual-spatial function and construction. Normal fund of knowledge and higher integrative function.  CN II: Pupils are equal, round, and reactive to light. Normal visual acuity and visual fields.    CN III IV VI: Extraocular movements are full without nystagmus.   CN V: Normal facial sensation and strength of muscles of mastication.  CN VII: Facial movements are symmetric. No weakness.  CN VIII:   Auditory acuity is normal.  CN IX & X:   Symmetric palatal movement.  CN XI: Sternocleidomastoid and trapezius are normal.  No weakness.  CN XII:   The tongue is midline.  No atrophy or fasciculations.  Motor: Normal muscle strength, bulk and tone in upper and lower extremities.  No fasciculations, rigidity, spasticity, or abnormal movements.  Sensation: Normal to light touch in arms and legs.   Station and Gait: Normal gait and station.    Coordination:  Finger to nose test shows no dysmetria.        Diagnoses:    1.  History  of left pontine stroke etiology felt to be secondary to uncontrolled risk factors specifically hypertension although patient has had isolated episode of atrial fibrillation and has a history of pulmonary embolism therefore recommend hypercoag panel to further exclude  2.  At risk for obstructive sleep apnea recommend outpatient MEGAN evaluation  3.  Chronic back pain; previously prescribed gabapentin discontinued-recommend trying again  4.  History of pulmonary      Plan:   Recommend considering resuming gabapentin   Continue aspirin 81 mg daily and atorvastatin 40 mg daily for secondary stroke prevention   Outpatient MEGAN evaluation-recommend follow and completion   Hypercoagulable panel   Blood pressure control to <130/80   Goal LDL <70-recommend high dose statins-    Serum glucose < 140   Call 911 for stroke any stroke symptoms   Follow-up in 6-month  Diagnoses and all orders for this visit:    1. History of stroke (Primary)  -     Antithrombin III; Future  -     Lupus Anticoagulant; Future  -     Homocysteine, serum; Future  -     Anticardiolipin Antibody, IgG / M, Qn; Future  -     Protein C & S Antigens; Future  -     Factor 5 Leiden; Future  -     Fibrinogen; Future  -     Factor II, DNA Analysis; Future  -     Antiphospholipid Antibody; Future  -     Viscosity, Serum; Future    2. History of pulmonary embolism  -     Antithrombin III; Future  -     Lupus Anticoagulant; Future  -     Homocysteine, serum; Future  -     Anticardiolipin Antibody, IgG / M, Qn; Future  -     Protein C & S Antigens; Future  -     Factor 5 Leiden; Future  -     Fibrinogen; Future  -     Factor II, DNA Analysis; Future  -     Antiphospholipid Antibody; Future  -     Viscosity, Serum; Future    3. PAF (paroxysmal atrial fibrillation)  -     Antithrombin III; Future  -     Lupus Anticoagulant; Future  -     Homocysteine, serum; Future  -     Anticardiolipin Antibody, IgG / M, Qn; Future  -     Protein C & S Antigens; Future  -     Factor  5 Leiden; Future  -     Fibrinogen; Future  -     Factor II, DNA Analysis; Future  -     Antiphospholipid Antibody; Future  -     Viscosity, Serum; Future    4. At risk for obstructive sleep apnea    5. Low back pain without sciatica, unspecified back pain laterality, unspecified chronicity

## 2023-05-26 ENCOUNTER — LAB (OUTPATIENT)
Dept: LAB | Facility: HOSPITAL | Age: 58
End: 2023-05-26
Payer: COMMERCIAL

## 2023-05-26 DIAGNOSIS — Z86.711 HISTORY OF PULMONARY EMBOLISM: ICD-10-CM

## 2023-05-26 DIAGNOSIS — I48.0 PAF (PAROXYSMAL ATRIAL FIBRILLATION): ICD-10-CM

## 2023-05-26 DIAGNOSIS — Z86.73 HISTORY OF STROKE: ICD-10-CM

## 2023-05-26 LAB
FIBRINOGEN PPP-MCNC: 321 MG/DL (ref 219–464)
HCYS SERPL-MCNC: 13.4 UMOL/L (ref 0–15)

## 2023-05-26 PROCEDURE — 85810 BLOOD VISCOSITY EXAMINATION: CPT

## 2023-05-26 PROCEDURE — 85306 CLOT INHIBIT PROT S FREE: CPT

## 2023-05-26 PROCEDURE — 85705 THROMBOPLASTIN INHIBITION: CPT

## 2023-05-26 PROCEDURE — 85305 CLOT INHIBIT PROT S TOTAL: CPT

## 2023-05-26 PROCEDURE — 83520 IMMUNOASSAY QUANT NOS NONAB: CPT

## 2023-05-26 PROCEDURE — 85613 RUSSELL VIPER VENOM DILUTED: CPT

## 2023-05-26 PROCEDURE — 85732 THROMBOPLASTIN TIME PARTIAL: CPT

## 2023-05-26 PROCEDURE — 85300 ANTITHROMBIN III ACTIVITY: CPT

## 2023-05-26 PROCEDURE — 36415 COLL VENOUS BLD VENIPUNCTURE: CPT

## 2023-05-26 PROCEDURE — 86148 ANTI-PHOSPHOLIPID ANTIBODY: CPT

## 2023-05-26 PROCEDURE — 85670 THROMBIN TIME PLASMA: CPT

## 2023-05-26 PROCEDURE — 85384 FIBRINOGEN ACTIVITY: CPT

## 2023-05-26 PROCEDURE — 86147 CARDIOLIPIN ANTIBODY EA IG: CPT

## 2023-05-26 PROCEDURE — 85302 CLOT INHIBIT PROT C ANTIGEN: CPT

## 2023-05-26 PROCEDURE — 81240 F2 GENE: CPT

## 2023-05-26 PROCEDURE — 83090 ASSAY OF HOMOCYSTEINE: CPT

## 2023-05-26 PROCEDURE — 81241 F5 GENE: CPT

## 2023-05-29 LAB
APTT SCREEN TO CONFIRM RATIO: 0.87 RATIO (ref 0–1.34)
CONFIRM APTT/NORMAL: 35.8 SEC (ref 0–47.6)
F5 GENE MUT ANL BLD/T: NORMAL
FACTOR II, DNA ANALYSIS: NORMAL
LA 2 SCREEN W REFLEX-IMP: NORMAL
SCREEN APTT: 33.8 SEC (ref 0–43.5)
SCREEN DRVVT: 35.2 SEC (ref 0–47)
THROMBIN TIME: 16.8 SEC (ref 0–23)

## 2023-05-30 LAB
AT III PPP CHRO-ACNC: 114 % (ref 90–134)
PROT C AG ACT/NOR PPP IA: 114 % (ref 60–150)
PROT S AG ACT/NOR PPP IA: 82 % (ref 60–150)
PROT S FREE AG ACT/NOR PPP IA: 91 % (ref 61–136)

## 2023-05-31 ENCOUNTER — TELEPHONE (OUTPATIENT)
Dept: NEUROLOGY | Facility: CLINIC | Age: 58
End: 2023-05-31

## 2023-05-31 NOTE — TELEPHONE ENCOUNTER
----- Message from ELA Tong sent at 5/30/2023  1:24 PM EDT -----  Hypercoagulable w/u unremarkable-unrevealing. Keep planned follow-up.

## 2023-06-05 LAB — VISC SER: 1.5 REL.SALINE (ref 1.4–2.1)

## 2023-06-14 LAB
INTERPRETATION: NORMAL
SPECIMEN STATUS: NORMAL

## 2023-06-16 ENCOUNTER — TELEPHONE (OUTPATIENT)
Dept: NEUROLOGY | Facility: CLINIC | Age: 58
End: 2023-06-16

## 2023-06-16 NOTE — TELEPHONE ENCOUNTER
Caller: Emy Medina    Relationship: Self    Best call back number: [phone]    Caller requesting test results: SELF    What test was performed: LAB [facility]  LABORATORY     When was the test performed: 05/26/23    Where was the test performed: [facility] LABORATORY     Additional notes: PT IS REQUESTING A CALL       PLEASE REVIEW    THANK YOU

## 2023-06-20 NOTE — TELEPHONE ENCOUNTER
LM, WITH PROVIDER'S RESPONSE TO QUESTION (LAB RESULT).  OUR CALL BACK PHONE NUMBER PROVIDED WITH THIS MESSAGE.

## 2023-08-16 ENCOUNTER — TELEPHONE (OUTPATIENT)
Dept: NEUROLOGY | Facility: CLINIC | Age: 58
End: 2023-08-16
Payer: COMMERCIAL

## 2023-08-16 NOTE — TELEPHONE ENCOUNTER
----- Message from Emy Medina sent at 8/15/2023  8:35 PM EDT -----  Regarding: Refill Request  Contact: [phone]  I am out of gabapentin 100 mg capsules, taking 3 at bedtime.  Could you please call in a new rx for them as I have no more refills available?  Thank you!

## 2023-08-16 NOTE — TELEPHONE ENCOUNTER
I spoke with pt and she will contact her pharmacy to contact our provider for refill.  Pt voices understanding and agrees.

## 2023-08-17 DIAGNOSIS — M54.50 LOW BACK PAIN WITHOUT SCIATICA, UNSPECIFIED BACK PAIN LATERALITY, UNSPECIFIED CHRONICITY: ICD-10-CM

## 2023-08-18 RX ORDER — GABAPENTIN 100 MG/1
CAPSULE ORAL
Qty: 120 CAPSULE | Refills: 0 | Status: SHIPPED | OUTPATIENT
Start: 2023-08-18

## 2023-08-20 NOTE — PLAN OF CARE
Goal Outcome Evaluation:  Plan of Care Reviewed With: patient   Pt has had a negative echo. Pt was seen by Dr Echols cardiologist. Pt being discharged with appropriate follow up plan. Pt agreeable to plan of care. Pt alert and orient times 4, NAD, denies pain, PERRLA. Pt will be wheeled out in wheelchair to main entrance, Daughter picking pt up.      Progress: improving      36.7

## 2023-08-28 ENCOUNTER — TELEPHONE (OUTPATIENT)
Dept: CARDIOLOGY | Facility: CLINIC | Age: 58
End: 2023-08-28
Payer: COMMERCIAL

## 2023-08-28 NOTE — TELEPHONE ENCOUNTER
Patient calls because this AM she woke up feeling dizzy. She took her BP and it was 155/102. She went ahead and took her PRN clonidine. She then waited a little bit and took her normal AM medications (coreg and hctz). Now her BP is 85/67. I explained to her that she is probably over medicated. She normally takes her BP first thing in the AM and again at night. I told her she should started checking her BPs in the AM 1-2 hours after her normal BP medications. I told her then at that time if her BP meets the parameters for clonidine then she can take it, but she should always take her daily medications first. For now I told her to eat something salty and really push the fluids. I recommended she take her BP before taking her night time meds tonight and if SBP is less than 110 she should hold valsartan since that is what she takes at night. I told her I would make you aware of all of this and if you had anything further to add I would call her back.     She did mention that she was going to send some BP readings though [employer], but AM readings are all before her AM medications.    Cesia Perez RN  Triage [facility]

## 2023-09-18 ENCOUNTER — HOSPITAL ENCOUNTER (EMERGENCY)
Facility: HOSPITAL | Age: 58
Discharge: HOME | End: 2023-09-18
Attending: EMERGENCY MEDICINE | Admitting: EMERGENCY MEDICINE
Payer: COMMERCIAL

## 2023-09-18 ENCOUNTER — APPOINTMENT (OUTPATIENT)
Dept: CT IMAGING | Facility: HOSPITAL | Age: 58
End: 2023-09-18
Payer: COMMERCIAL

## 2023-09-18 ENCOUNTER — APPOINTMENT (OUTPATIENT)
Dept: GENERAL RADIOLOGY | Facility: HOSPITAL | Age: 58
End: 2023-09-18
Payer: COMMERCIAL

## 2023-09-18 VITALS
BODY MASS INDEX: 25.71 KG/M2 | WEIGHT: 160 LBS | HEIGHT: 66 IN | TEMPERATURE: 98 F | HEART RATE: 47 BPM | OXYGEN SATURATION: 97 % | RESPIRATION RATE: 18 BRPM | SYSTOLIC BLOOD PRESSURE: 122 MMHG | DIASTOLIC BLOOD PRESSURE: 82 MMHG

## 2023-09-18 DIAGNOSIS — R03.0 ELEVATED BLOOD PRESSURE READING: Primary | ICD-10-CM

## 2023-09-18 DIAGNOSIS — R10.13 EPIGASTRIC ABDOMINAL PAIN: ICD-10-CM

## 2023-09-18 DIAGNOSIS — R09.89 LABILE BLOOD PRESSURE: ICD-10-CM

## 2023-09-18 LAB
ALBUMIN SERPL-MCNC: 4.3 G/DL (ref 3.5–5.2)
ALBUMIN/GLOB SERPL: 2 G/DL
ALP SERPL-CCNC: 75 U/L (ref 39–117)
ALT SERPL W P-5'-P-CCNC: 26 U/L (ref 1–33)
ANION GAP SERPL CALCULATED.3IONS-SCNC: 12.6 MMOL/L (ref 5–15)
AST SERPL-CCNC: 22 U/L (ref 1–32)
BASOPHILS # BLD AUTO: 0.07 10*3/MM3 (ref 0–0.2)
BASOPHILS NFR BLD AUTO: 0.9 % (ref 0–1.5)
BILIRUB SERPL-MCNC: 0.7 MG/DL (ref 0–1.2)
BILIRUB UR QL STRIP: NEGATIVE
BUN SERPL-MCNC: 25 MG/DL (ref 8–23)
BUN/CREAT SERPL: 26.6 (ref 7–25)
CALCIUM SPEC-SCNC: 9.2 MG/DL (ref 8.6–10.5)
CHLORIDE SERPL-SCNC: 107 MMOL/L (ref 98–107)
CLARITY UR: CLEAR
CO2 SERPL-SCNC: 23.4 MMOL/L (ref 22–29)
COLOR UR: YELLOW
CREAT SERPL-MCNC: 0.94 MG/DL (ref 0.57–1)
D DIMER PPP FEU-MCNC: 0.49 MCGFEU/ML (ref 0–0.6)
DEPRECATED RDW RBC AUTO: 42.9 FL (ref 37–54)
EGFRCR SERPLBLD CKD-EPI 2021: 69.6 ML/MIN/1.73
EOSINOPHIL # BLD AUTO: 0.41 10*3/MM3 (ref 0–0.4)
EOSINOPHIL NFR BLD AUTO: 5 % (ref 0.3–6.2)
ERYTHROCYTE [DISTWIDTH] IN BLOOD BY AUTOMATED COUNT: 13.2 % (ref 12.3–15.4)
GEN 5 2HR TROPONIN T REFLEX: 9 NG/L
GLOBULIN UR ELPH-MCNC: 2.2 GM/DL
GLUCOSE SERPL-MCNC: 119 MG/DL (ref 65–99)
GLUCOSE UR STRIP-MCNC: NEGATIVE MG/DL
HCT VFR BLD AUTO: 39.9 % (ref 34–46.6)
HGB BLD-MCNC: 13.5 G/DL (ref 12–15.9)
HGB UR QL STRIP.AUTO: NEGATIVE
IMM GRANULOCYTES # BLD AUTO: 0.03 10*3/MM3 (ref 0–0.05)
IMM GRANULOCYTES NFR BLD AUTO: 0.4 % (ref 0–0.5)
KETONES UR QL STRIP: NEGATIVE
LEUKOCYTE ESTERASE UR QL STRIP.AUTO: NEGATIVE
LIPASE SERPL-CCNC: 37 U/L (ref 13–60)
LYMPHOCYTES # BLD AUTO: 1.72 10*3/MM3 (ref 0.7–3.1)
LYMPHOCYTES NFR BLD AUTO: 20.9 % (ref 19.6–45.3)
MCH RBC QN AUTO: 30.2 PG (ref 26.6–33)
MCHC RBC AUTO-ENTMCNC: 33.8 G/DL (ref 31.5–35.7)
MCV RBC AUTO: 89.3 FL (ref 79–97)
MONOCYTES # BLD AUTO: 0.54 10*3/MM3 (ref 0.1–0.9)
MONOCYTES NFR BLD AUTO: 6.6 % (ref 5–12)
NEUTROPHILS NFR BLD AUTO: 5.46 10*3/MM3 (ref 1.7–7)
NEUTROPHILS NFR BLD AUTO: 66.2 % (ref 42.7–76)
NITRITE UR QL STRIP: NEGATIVE
NRBC BLD AUTO-RTO: 0 /100 WBC (ref 0–0.2)
PH UR STRIP.AUTO: 6 [PH] (ref 5–8)
PLATELET # BLD AUTO: 190 10*3/MM3 (ref 140–450)
PMV BLD AUTO: 10.6 FL (ref 6–12)
POTASSIUM SERPL-SCNC: 3.6 MMOL/L (ref 3.5–5.2)
PROT SERPL-MCNC: 6.5 G/DL (ref 6–8.5)
PROT UR QL STRIP: NEGATIVE
QT INTERVAL: 479 MS
QTC INTERVAL: 433 MS
RBC # BLD AUTO: 4.47 10*6/MM3 (ref 3.77–5.28)
SODIUM SERPL-SCNC: 143 MMOL/L (ref 136–145)
SP GR UR STRIP: 1.02 (ref 1–1.03)
TROPONIN T DELTA: -3 NG/L
TROPONIN T SERPL HS-MCNC: 12 NG/L
UROBILINOGEN UR QL STRIP: NORMAL
WBC NRBC COR # BLD: 8.23 10*3/MM3 (ref 3.4–10.8)

## 2023-09-18 PROCEDURE — 84484 ASSAY OF TROPONIN QUANT: CPT | Performed by: PHYSICIAN ASSISTANT

## 2023-09-18 PROCEDURE — 93005 ELECTROCARDIOGRAM TRACING: CPT | Performed by: PHYSICIAN ASSISTANT

## 2023-09-18 PROCEDURE — 85379 FIBRIN DEGRADATION QUANT: CPT | Performed by: PHYSICIAN ASSISTANT

## 2023-09-18 PROCEDURE — 96374 THER/PROPH/DIAG INJ IV PUSH: CPT

## 2023-09-18 PROCEDURE — 36415 COLL VENOUS BLD VENIPUNCTURE: CPT

## 2023-09-18 PROCEDURE — 25010000002 ONDANSETRON PER 1 MG: Performed by: PHYSICIAN ASSISTANT

## 2023-09-18 PROCEDURE — 71045 X-RAY EXAM CHEST 1 VIEW: CPT

## 2023-09-18 PROCEDURE — 74177 CT ABD & PELVIS W/CONTRAST: CPT

## 2023-09-18 PROCEDURE — 85025 COMPLETE CBC W/AUTO DIFF WBC: CPT | Performed by: PHYSICIAN ASSISTANT

## 2023-09-18 PROCEDURE — 80053 COMPREHEN METABOLIC PANEL: CPT | Performed by: PHYSICIAN ASSISTANT

## 2023-09-18 PROCEDURE — 93010 ELECTROCARDIOGRAM REPORT: CPT | Performed by: INTERNAL MEDICINE

## 2023-09-18 PROCEDURE — 25510000001 IOPAMIDOL 61 % SOLUTION: Performed by: EMERGENCY MEDICINE

## 2023-09-18 PROCEDURE — 81003 URINALYSIS AUTO W/O SCOPE: CPT | Performed by: PHYSICIAN ASSISTANT

## 2023-09-18 PROCEDURE — 83690 ASSAY OF LIPASE: CPT | Performed by: PHYSICIAN ASSISTANT

## 2023-09-18 PROCEDURE — 96375 TX/PRO/DX INJ NEW DRUG ADDON: CPT

## 2023-09-18 PROCEDURE — 99285 EMERGENCY DEPT VISIT HI MDM: CPT

## 2023-09-18 RX ORDER — SUCRALFATE 1 G/1
1 TABLET ORAL 4 TIMES DAILY
Qty: 60 TABLET | Refills: 0 | Status: SHIPPED | OUTPATIENT
Start: 2023-09-18

## 2023-09-18 RX ORDER — ONDANSETRON 2 MG/ML
4 INJECTION INTRAMUSCULAR; INTRAVENOUS ONCE
Status: COMPLETED | OUTPATIENT
Start: 2023-09-18 | End: 2023-09-18

## 2023-09-18 RX ORDER — FAMOTIDINE 10 MG/ML
20 INJECTION, SOLUTION INTRAVENOUS ONCE
Status: COMPLETED | OUTPATIENT
Start: 2023-09-18 | End: 2023-09-18

## 2023-09-18 RX ORDER — SODIUM CHLORIDE 0.9 % (FLUSH) 0.9 %
10 SYRINGE (ML) INJECTION AS NEEDED
Status: DISCONTINUED | OUTPATIENT
Start: 2023-09-18 | End: 2023-09-18 | Stop reason: HOSPADM

## 2023-09-18 RX ADMIN — FAMOTIDINE 20 MG: 10 INJECTION INTRAVENOUS at 07:41

## 2023-09-18 RX ADMIN — ONDANSETRON 4 MG: 2 INJECTION INTRAMUSCULAR; INTRAVENOUS at 07:41

## 2023-09-18 RX ADMIN — IOPAMIDOL 85 ML: 612 INJECTION, SOLUTION INTRAVENOUS at 08:33

## 2023-09-18 NOTE — ED PROVIDER NOTES
MD ATTESTATION NOTE    The ASHLEIGH and I have discussed this patient's history, physical exam, and treatment plan.  I have reviewed the documentation and personally had a face to face interaction with the patient. I affirm the documentation and agree with the treatment and plan.  The attached note describes my personal findings.      I provided a substantive portion of the care of the patient.  I personally performed the physical exam in its entirety, and below are my findings.      Brief HPI: Patient complains of elevated blood pressure.  She checks her blood pressure multiple times daily and it has been higher than normal since last night.  When she checked it this morning it was 190/119 so she took a dose of clonidine.  She has not yet taken her morning doses of carvedilol and valsartan.  She also reports intermittent indigestion for the past 1 week.  Denies nausea, vomiting, sweating, or shortness of breath.    PHYSICAL EXAM  ED Triage Vitals   Temp Heart Rate Resp BP SpO2   09/18/23 0651 09/18/23 0651 09/18/23 0651 09/18/23 0653 09/18/23 0651   98 °F (36.7 °C) 58 18 129/86 97 %      Temp src Heart Rate Source Patient Position BP Location FiO2 (%)   09/18/23 0651 -- -- -- --   Tympanic             GENERAL: Awake, alert, oriented x3.  Well-developed, well-nourished female.  Resting comfortably in no acute distress  HENT: nares patent  EYES: no scleral icterus  CV: regular rhythm, normal rate  RESPIRATORY: normal effort, clear to auscultation bilaterally  ABDOMEN: soft, nontender  MUSCULOSKELETAL: Extremities are nontender.  No calf tenderness.  No pedal edema  NEURO: alert, moves all extremities, follows commands  PSYCH:  calm, cooperative  SKIN: warm, dry    Vital signs and nursing notes reviewed.        Plan: Obtain labs, EKG, and imaging studies    Initial troponin was 12.  Delta troponin was -3.  EKG did not have any acute ischemic changes.  D-dimer was negative.  LFTs and lipase were normal.  Chest x-ray and CT  abdomen/pelvis were negative acute.  Her blood pressure improved in the ED without any further medication.  She will be discharged on Carafate and referred to GI.  She was also advised to follow-up with her PCP for hypertension.      ED Course as of 09/20/23 1046   Mon Sep 18, 2023   0725 Patient presents with a 1 week history of generalized malaise, indigestion, chest pain, hypertension.  Differential diagnoses include but not limited to ACS, gastritis, cardiac arrhythmia, electrolyte abnormality. [EE]   0801 WBC: 8.23 [EE]   0801 Hemoglobin: 13.5 [EE]   0812 D-Dimer, Quant: 0.49 [EE]   0829 EKG personally interpreted by me.  My personal interpretation is:      EKG time: 7:31 AM  Rhythm/Rate: Sinus bradycardia, rate 49  P waves and CA: Normal  QRS, axis: LAD, LVH  ST and T waves: Nonspecific ST/T wave changes in the lateral and inferior leads    Interpreted Contemporaneously by me, independently viewed  EKG is changed compared to prior EKG done on 3/21/2023.  Atrial fibrillation was present at that time.  There are no new ischemic change   [WH]   1020 Troponin T Delta: -3 [EE]   1021 Chest x-ray independently interpreted myself shows no acute infiltrate. [EE]   1110 Recheck of patient.  She states she is feeling much improved.  She has a fairly benign work-up here.  Going forward from here we will add Carafate to her GERD regimen.  We will refer her to GI.    In addition we will have the patient take a half a clonidine for any elevated blood pressure readings.  She has appropriate follow-up. [EE]   1517 Heart score (prior to troponin) 2 : +1 age, + risk factors [EE]      ED Course User Index  [EE] Mark Ly PA  [] Ervin Ricks MD Holland, William D, MD  09/18/23 1206       Ervin Ricks MD  09/20/23 1046

## 2023-09-18 NOTE — ED PROVIDER NOTES
EMERGENCY DEPARTMENT ENCOUNTER    Room Number:  03/03  Date of encounter:  9/18/2023  PCP: Clem Bush MD  Historian: Patient, spouse  Chronic or social conditions impacting care (social determinants of health): Nothing    HPI:  Chief Complaint: Malaise, indigestion, elevated blood pressure  A complete HPI/ROS/PMH/PSH/SH/FH are unobtainable due to: Nothing    Context: Emy Medina is a 58 y.o. female who presents to the ED c/o approximate 1 week history of generalized malaise, indigestion.  Patient describes indigestion as a burning sensation in her epigastric area with radiation up to the throat.  She states at times this can be in her chest.  She has been taking Prilosec at home with some improvement.  She denies any overt vomiting, diarrhea.  She denies any precipitating or alleviating factors.    In addition patient states her blood pressure has been labile over the past several days.  It was up to 190/119 this morning.  She does have clonidine to take as needed.  After taking this she began to feel generally weak.  Her blood pressure is improved at this time.    At this time she states she feels overall better.  She does have remote history of PE, stroke, A-fib.  She is off of anticoagulation.    Review of prior external notes (non-ED):   I reviewed neurology office visit from 5/17/2023.  Patient being followed for history of stroke, PE, paroxysmal A-fib.    Review of prior external test results outside of this encounter:  Reviewed a CMP from 6/8/2023.  Potassium 4.0, creatinine 0.97    PAST MEDICAL HISTORY  Active Ambulatory Problems     Diagnosis Date Noted   • Chest pain 02/17/2022   • Cerebrovascular accident (CVA) 08/02/2022   • HTN (hypertension) 08/03/2022   • Bradycardia 08/03/2022   • FUENTES (acute kidney injury) 08/03/2022   • FUENTES (acute kidney injury) 08/03/2022   • New onset atrial fibrillation 03/21/2023   • Hyperglycemia 03/21/2023   • Leukocytosis 03/21/2023   • History of pulmonary  embolism    • Hypokalemia    • Pharyngitis      Resolved Ambulatory Problems     Diagnosis Date Noted   • No Resolved Ambulatory Problems     Past Medical History:   Diagnosis Date   • Arthritis    • Hypertension    • PE (pulmonary embolism)    • Pleurisy          PAST SURGICAL HISTORY  Past Surgical History:   Procedure Laterality Date   • CATARACT EXTRACTION     • HYSTERECTOMY     • LASIK     • SINUS SURGERY           FAMILY HISTORY  Family History   Problem Relation Age of Onset   • Stroke Mother    • Hypertension Mother    • Multiple sclerosis Father          SOCIAL HISTORY  Social History     Socioeconomic History   • Marital status:    Tobacco Use   • Smoking status: Never   • Smokeless tobacco: Never   Vaping Use   • Vaping Use: Never used   Substance and Sexual Activity   • Alcohol use: No   • Drug use: No   • Sexual activity: Yes     Partners: Male         ALLERGIES  Amoxicillin-pot clavulanate, Doxycycline, and Sulfa antibiotics        REVIEW OF SYSTEMS  All systems reviewed and negative except for those discussed in HPI.       PHYSICAL EXAM    I have reviewed the triage vital signs and nursing notes.    ED Triage Vitals   Temp Heart Rate Resp BP SpO2   09/18/23 0651 09/18/23 0651 09/18/23 0651 09/18/23 0653 09/18/23 0651   98 °F (36.7 °C) 58 18 129/86 97 %      Temp src Heart Rate Source Patient Position BP Location FiO2 (%)   09/18/23 0651 -- -- -- --   Tympanic           Physical Exam  GENERAL: Alert, oriented, not distressed  HENT: head atraumatic, no nuchal rigidity  EYES: no scleral icterus, EOMI  CV: regular rhythm, regular rate, no murmur  RESPIRATORY: normal effort, CTA  ABDOMEN: soft, mild epigastric tenderness  MUSCULOSKELETAL: no deformity, FROM, no calf swelling or tenderness  NEURO: alert, moves all extremities, follows commands  SKIN: warm, dry        LAB RESULTS  Recent Results (from the past 24 hour(s))   ECG 12 Lead Chest Pain    Collection Time: 09/18/23  7:31 AM   Result Value  Ref Range    QT Interval 479 ms    QTC Interval 433 ms   Comprehensive Metabolic Panel    Collection Time: 09/18/23  7:40 AM    Specimen: Blood   Result Value Ref Range    Glucose 119 (H) 65 - 99 mg/dL    BUN 25 (H) 8 - 23 mg/dL    Creatinine 0.94 0.57 - 1.00 mg/dL    Sodium 143 136 - 145 mmol/L    Potassium 3.6 3.5 - 5.2 mmol/L    Chloride 107 98 - 107 mmol/L    CO2 23.4 22.0 - 29.0 mmol/L    Calcium 9.2 8.6 - 10.5 mg/dL    Total Protein 6.5 6.0 - 8.5 g/dL    Albumin 4.3 3.5 - 5.2 g/dL    ALT (SGPT) 26 1 - 33 U/L    AST (SGOT) 22 1 - 32 U/L    Alkaline Phosphatase 75 39 - 117 U/L    Total Bilirubin 0.7 0.0 - 1.2 mg/dL    Globulin 2.2 gm/dL    A/G Ratio 2.0 g/dL    BUN/Creatinine Ratio 26.6 (H) 7.0 - 25.0    Anion Gap 12.6 5.0 - 15.0 mmol/L    eGFR 69.6 >60.0 mL/min/1.73   High Sensitivity Troponin T    Collection Time: 09/18/23  7:40 AM    Specimen: Blood   Result Value Ref Range    HS Troponin T 12 (H) <10 ng/L   Lipase    Collection Time: 09/18/23  7:40 AM    Specimen: Blood   Result Value Ref Range    Lipase 37 13 - 60 U/L   D-dimer, Quantitative    Collection Time: 09/18/23  7:40 AM    Specimen: Blood   Result Value Ref Range    D-Dimer, Quantitative 0.49 0.00 - 0.60 MCGFEU/mL   CBC Auto Differential    Collection Time: 09/18/23  7:40 AM    Specimen: Blood   Result Value Ref Range    WBC 8.23 3.40 - 10.80 10*3/mm3    RBC 4.47 3.77 - 5.28 10*6/mm3    Hemoglobin 13.5 12.0 - 15.9 g/dL    Hematocrit 39.9 34.0 - 46.6 %    MCV 89.3 79.0 - 97.0 fL    MCH 30.2 26.6 - 33.0 pg    MCHC 33.8 31.5 - 35.7 g/dL    RDW 13.2 12.3 - 15.4 %    RDW-SD 42.9 37.0 - 54.0 fl    MPV 10.6 6.0 - 12.0 fL    Platelets 190 140 - 450 10*3/mm3    Neutrophil % 66.2 42.7 - 76.0 %    Lymphocyte % 20.9 19.6 - 45.3 %    Monocyte % 6.6 5.0 - 12.0 %    Eosinophil % 5.0 0.3 - 6.2 %    Basophil % 0.9 0.0 - 1.5 %    Immature Grans % 0.4 0.0 - 0.5 %    Neutrophils, Absolute 5.46 1.70 - 7.00 10*3/mm3    Lymphocytes, Absolute 1.72 0.70 - 3.10 10*3/mm3     Monocytes, Absolute 0.54 0.10 - 0.90 10*3/mm3    Eosinophils, Absolute 0.41 (H) 0.00 - 0.40 10*3/mm3    Basophils, Absolute 0.07 0.00 - 0.20 10*3/mm3    Immature Grans, Absolute 0.03 0.00 - 0.05 10*3/mm3    nRBC 0.0 0.0 - 0.2 /100 WBC   Urinalysis With Microscopic If Indicated (No Culture) - Urine, Clean Catch    Collection Time: 09/18/23  7:57 AM    Specimen: Urine, Clean Catch   Result Value Ref Range    Color, UA Yellow Yellow, Straw    Appearance, UA Clear Clear    pH, UA 6.0 5.0 - 8.0    Specific Gravity, UA 1.020 1.005 - 1.030    Glucose, UA Negative Negative    Ketones, UA Negative Negative    Bilirubin, UA Negative Negative    Blood, UA Negative Negative    Protein, UA Negative Negative    Leuk Esterase, UA Negative Negative    Nitrite, UA Negative Negative    Urobilinogen, UA 0.2 E.U./dL 0.2 - 1.0 E.U./dL   High Sensitivity Troponin T 2Hr    Collection Time: 09/18/23  9:41 AM    Specimen: Blood   Result Value Ref Range    HS Troponin T 9 <10 ng/L    Troponin T Delta -3 >=-4 - <+4 ng/L       Ordered the above labs and independently reviewed the results.        RADIOLOGY  CT Abdomen Pelvis With Contrast    Result Date: 9/18/2023  CT ABDOMEN AND PELVIS WITH CONTRAST  HISTORY: 58-year-old female with epigastric pain.  TECHNIQUE: Axial CT images of the abdomen and pelvis were obtained following administration of intravenous contrast. The patient was not given oral contrast Coronal and sagittal reformats were obtained.  COMPARISON: 02/02/2016  FINDINGS: The liver demonstrates normal attenuation. There is a hypoattenuating focus within the left hepatic lobe on image 31 that is too small to characterize, however, favored to represent a cyst. The gallbladder is unremarkable. The pancreas is normal without ductal dilatation. The spleen is normal in size and attenuation. Bilateral adrenal glands are normal. No renal calculi or hydronephrosis. Urinary bladder is partially distended. The uterus is surgically absent.  Diffuse pelvic floor weakening is present. No abnormal adnexal mass.  The small and large bowel loops demonstrate normal caliber. Colonic diverticulosis is present. Numerous duodenal diverticuli are also present. The appendix is normal. No pathological retroperitoneal lymphadenopathy. Mild interstitial thickening possible reticulonodular densities seen within the lingula. There is also a left lower lobe nodule on image 22 measuring sub-5 mm that is unchanged from prior examination. L5-S1 degenerative disc disease with vacuum disc phenomena. Superior endplate compression deformity of the L1 vertebral body new from prior imaging in 2016, however, appears to be chronic.      1. No acute abnormality within the abdomen/pelvis. Probable atelectasis/mild reticulonodular infiltrate within the lingula. Please correlate with clinical symptoms. 2. Colonic diverticulosis.  Radiation dose reduction techniques were utilized, including automated exposure control and exposure modulation based on body size.       XR Chest 1 View    Result Date: 9/18/2023  Clinical: Chest pain  COMPARISON examination 3/21/2023  FINDINGS: The cardiomediastinal silhouette is satisfactory in appearance. Lungs clear. No effusion or edema.  CONCLUSION: No active disease of the chest  This report was finalized on 9/18/2023 8:45 AM by Dr. Jonathan Loja M.D.       I ordered the above noted radiological studies. Reviewed by me and discussed with radiologist.  See dictation for official radiology interpretation.      MEDICATIONS GIVEN IN ER    Medications   ondansetron (ZOFRAN) injection 4 mg (4 mg Intravenous Given 9/18/23 7692)   famotidine (PEPCID) injection 20 mg (20 mg Intravenous Given 9/18/23 1041)   iopamidol (ISOVUE-300) 61 % injection 100 mL (85 mL Intravenous Given by Other 9/18/23 5699)         ADDITIONAL ORDERS CONSIDERED BUT NOT ORDERED:  Considered admission however patient's symptoms do not appear to be secondary to cardiac ischemia,  PE      PROGRESS, DATA ANALYSIS, CONSULTS, AND MEDICAL DECISION MAKING    All labs have been independently interpreted by myself.  All radiology studies have been independently interpreted by myself and discussed with radiologist dictating the report.   EKG's independently interpreted by myself.  Discussion below represents my analysis of pertinent findings related to patient's condition, differential diagnosis, treatment plan and final disposition.    I have discussed case with Dr. Ricks, emergency room physician.  He has performed his own bedside examination and agrees with treatment plan.    ED Course as of 09/18/23 1517   Mon Sep 18, 2023   0725 Patient presents with a 1 week history of generalized malaise, indigestion, chest pain, hypertension.  Differential diagnoses include but not limited to ACS, gastritis, cardiac arrhythmia, electrolyte abnormality. [EE]   0801 WBC: 8.23 [EE]   0801 Hemoglobin: 13.5 [EE]   0812 D-Dimer, Quant: 0.49 [EE]   0829 EKG personally interpreted by me.  My personal interpretation is:      EKG time: 7:31 AM  Rhythm/Rate: Sinus bradycardia, rate 49  P waves and AL: Normal  QRS, axis: LAD, LVH  ST and T waves: Nonspecific ST/T wave changes in the lateral and inferior leads    Interpreted Contemporaneously by me, independently viewed  EKG is changed compared to prior EKG done on 3/21/2023.  Atrial fibrillation was present at that time.  There are no new ischemic change   [WH]   1020 Troponin T Delta: -3 [EE]   1021 Chest x-ray independently interpreted myself shows no acute infiltrate. [EE]   1110 Recheck of patient.  She states she is feeling much improved.  She has a fairly benign work-up here.  Going forward from here we will add Carafate to her GERD regimen.  We will refer her to GI.    In addition we will have the patient take a half a clonidine for any elevated blood pressure readings.  She has appropriate follow-up. [EE]   1517 Heart score (prior to troponin) 2 : +1 age, +  risk factors [EE]      ED Course User Index  [EE] Mark Ly, PA  [WH] Ervin Ricks MD       AS OF 15:17 EDT VITALS:    BP - 122/82  HR - (!) 47  TEMP - 98 °F (36.7 °C) (Tympanic)  O2 SATS - 97%        DIAGNOSIS  Final diagnoses:   Elevated blood pressure reading   Labile blood pressure   Epigastric abdominal pain         DISPOSITION  Discharged      Dictated utilizing Dragon dictation     Mark Ly PA  09/18/23 8883

## 2023-09-18 NOTE — DISCHARGE INSTRUCTIONS
Take clonidine 1/2 tablet as needed for elevated blood pressure readings.    Return to ER for any worsening symptoms

## 2023-09-18 NOTE — ED TRIAGE NOTES
Pt ambulatory from home with spouse after reports that last pm had headache and checked BP and was elevated and has been elevated all night into this am and states that took a clonidine pta due to /116 and on arrival pt normotensive and states continues to have general malaise.

## 2023-10-06 DIAGNOSIS — M54.50 LOW BACK PAIN WITHOUT SCIATICA, UNSPECIFIED BACK PAIN LATERALITY, UNSPECIFIED CHRONICITY: ICD-10-CM

## 2023-10-10 RX ORDER — GABAPENTIN 100 MG/1
CAPSULE ORAL
Qty: 120 CAPSULE | Refills: 0 | Status: SHIPPED | OUTPATIENT
Start: 2023-10-10

## 2023-11-07 ENCOUNTER — OFFICE VISIT (OUTPATIENT)
Dept: CARDIOLOGY | Facility: CLINIC | Age: 58
End: 2023-11-07
Payer: COMMERCIAL

## 2023-11-07 VITALS
SYSTOLIC BLOOD PRESSURE: 100 MMHG | HEIGHT: 66 IN | DIASTOLIC BLOOD PRESSURE: 69 MMHG | BODY MASS INDEX: 24.75 KG/M2 | HEART RATE: 83 BPM | OXYGEN SATURATION: 95 % | WEIGHT: 154 LBS

## 2023-11-07 DIAGNOSIS — I10 PRIMARY HYPERTENSION: ICD-10-CM

## 2023-11-07 DIAGNOSIS — I48.91 NEW ONSET ATRIAL FIBRILLATION: Primary | ICD-10-CM

## 2023-11-07 PROCEDURE — 99214 OFFICE O/P EST MOD 30 MIN: CPT | Performed by: NURSE PRACTITIONER

## 2023-11-07 PROCEDURE — 93000 ELECTROCARDIOGRAM COMPLETE: CPT | Performed by: NURSE PRACTITIONER

## 2023-11-07 RX ORDER — FLUTICASONE PROPIONATE 50 MCG
SPRAY, SUSPENSION (ML) NASAL
COMMUNITY
Start: 2023-10-04

## 2023-11-07 RX ORDER — CARVEDILOL 12.5 MG/1
12.5 TABLET ORAL 2 TIMES DAILY
Qty: 180 TABLET | Refills: 3 | Status: SHIPPED | OUTPATIENT
Start: 2023-11-07

## 2023-11-07 RX ORDER — MELOXICAM 15 MG/1
TABLET ORAL EVERY 24 HOURS
COMMUNITY

## 2023-11-07 NOTE — PROGRESS NOTES
Date of Office Visit: 2023  Encounter Provider: LEA Durand  Place of Service: [facility] CARDIOLOGY  Patient Name: Emy Medina  :1965    Chief Complaint   Patient presents with    Follow-up    Atrial Fibrillation   :     HPI: Emy Medina is a 58 y.o. female who is a patient of  Dr. Echols and is new to me today. She has a history of pulmonary embolus and stroke in the past we first met her in 2022 where she presented to the emergency room with chest discomfort her blood pressure was really high and once better controlled things improved.  In August she noted some unilateral weakness in her arm and leg and was found to have a lacunar infarct.  She apparently had waited to go to the emergency room a few days.  In April of this year she had recently been admitted with dehydration due to decreased oral intake in the setting of acute pharyngitis.  She was in A-fib when she arrived and spontaneously converted.  She was not anticoagulated due to briefness of episode in the setting of acute illness.  She is here for follow-up today.    Overall she is doing well.  She checks her blood pressure every morning and evening.  She has been following a guideline that if her systolic blood pressure is under 120 she does not take her carvedilol.  This has been working well she will occasionally have a low or high blood pressure and for the most part is anywhere from 130s to 140s.  She has been having some issues with her back and may need back surgery next year.  She has not had any chest discomfort.  Her resting heart rate is sometimes in the 40s and 50s but she is asymptomatic.  She has a good chronotropic response to activity.  Previous testing and notes have been reviewed by me.   Past Medical History:   Diagnosis Date    Arthritis     Hypertension     PE (pulmonary embolism)     Pleurisy        Past Surgical History:   Procedure Laterality Date    CATARACT  EXTRACTION      HYSTERECTOMY      LASIK      SINUS SURGERY         Social History     Socioeconomic History    Marital status:    Tobacco Use    Smoking status: Never    Smokeless tobacco: Never   Vaping Use    Vaping Use: Never used   Substance and Sexual Activity    Alcohol use: No    Drug use: No    Sexual activity: Yes     Partners: Male       Family History   Problem Relation Age of Onset    Stroke Mother     Hypertension Mother     Multiple sclerosis Father        Review of Systems   Constitutional: Negative for diaphoresis and malaise/fatigue.   Cardiovascular:  Negative for chest pain, claudication, dyspnea on exertion, irregular heartbeat, leg swelling, near-syncope, orthopnea, palpitations, paroxysmal nocturnal dyspnea and syncope.   Respiratory:  Negative for cough, shortness of breath and sleep disturbances due to breathing.    Musculoskeletal:  Negative for falls.   Neurological:  Negative for dizziness and weakness.   Psychiatric/Behavioral:  Negative for altered mental status and substance abuse.        Allergies   Allergen Reactions    Amoxicillin-Pot Clavulanate Diarrhea     Stomach cramping     Doxycycline     Sulfa Antibiotics Hives         Current Outpatient Medications:     aspirin 81 MG chewable tablet, Chew 1 tablet Daily., Disp: 90 tablet, Rfl: 0    atorvastatin (LIPITOR) 40 MG tablet, Take 1 tablet by mouth Every Night., Disp: 30 tablet, Rfl: 0    azelastine (OPTIVAR) 0.05 % ophthalmic solution, instill 1 drop into both eyes twice a day, Disp: , Rfl:     Calcium Carbonate-Vit D-Min (CALCIUM 1200 PO), Take  by mouth., Disp: , Rfl:     Calcium Citrate-Vitamin D 500-10 MG-MCG chewable tablet, Every 12 (Twelve) Hours., Disp: , Rfl:     carvedilol (COREG) 12.5 MG tablet, Take 1 tablet by mouth 2 (Two) Times a Day., Disp: 180 tablet, Rfl: 3    Cholecalciferol (Vitamin D3) 10 MCG (400 UNIT) capsule, Daily., Disp: , Rfl:     cloNIDine (Catapres) 0.1 MG tablet, Take 1 tablet by mouth Daily As  "Needed for High Blood Pressure (for BP that is persistently elevated >200/100)., Disp: 30 tablet, Rfl: 0    diclofenac (VOLTAREN) 50 MG EC tablet, Take 1 tablet by mouth Every 12 (Twelve) Hours., Disp: , Rfl:     fluticasone (FLONASE) 50 MCG/ACT nasal spray, , Disp: , Rfl:     gabapentin (NEURONTIN) 100 MG capsule, TAKE 1 CAPSULE IN THE MORNING AND 1 CAP MID-DAY AND 2 CAPS AT NIGHT, Disp: 120 capsule, Rfl: 0    hydroCHLOROthiazide (HYDRODIURIL) 12.5 MG tablet, Take 1 tablet by mouth Daily. Pt takes 1/2 tab qam, Disp: , Rfl:     meloxicam (MOBIC) 15 MG tablet, Daily., Disp: , Rfl:     omeprazole (priLOSEC) 20 MG capsule, TAKE 1 TABLET DAILY 1 HR BEFORE EVENING MEAL., Disp: , Rfl:     sucralfate (CARAFATE) 1 g tablet, Take 1 tablet by mouth 4 (Four) Times a Day., Disp: 60 tablet, Rfl: 0    valsartan (DIOVAN) 160 MG tablet, Take 0.5 tablets by mouth every night at bedtime., Disp: 180 tablet, Rfl: 1      Objective:     Vitals:    11/07/23 1259   BP: 100/69   Pulse: 83   SpO2: 95%   Weight: 69.9 kg (154 lb)   Height: 167.6 cm (66\")     Body mass index is 24.86 kg/m².    PHYSICAL EXAM:    Constitutional:       General: Not in acute distress.     Appearance: Normal appearance. Well-developed.   Eyes:      Pupils: Pupils are equal, round, and reactive to light.   HENT:      Head: Normocephalic.   Neck:      Vascular: No carotid bruit or JVD.   Pulmonary:      Effort: Pulmonary effort is normal. No tachypnea.      Breath sounds: Normal breath sounds. No wheezing. No rales.   Cardiovascular:      Normal rate. Regular rhythm.      No gallop.    Pulses:     Intact distal pulses.   Edema:     Peripheral edema absent.   Abdominal:      General: Bowel sounds are normal.      Palpations: Abdomen is soft.      Tenderness: There is no abdominal tenderness.   Musculoskeletal: Normal range of motion.      Cervical back: Normal range of motion and neck supple. No edema. Skin:     General: Skin is warm and dry.   Neurological:      " Mental Status: Alert and oriented to person, place, and time.           ECG 12 Lead    Date/Time: 11/7/2023 1:36 PM  Performed by: Tg Arce APRN    Authorized by: Tg Arce APRN  Comparison: compared with previous ECG from 9/18/2023  Similar to previous ECG  Rhythm: sinus bradycardia  Rate: bradycardic  BPM: 44  Q waves: V1 and V2    ST Depression: II, III, aVF, V5, V6, V4 and V3  QRS axis: normal  Other findings: non-specific ST-T wave changes    Clinical impression: abnormal EKG            Assessment:       Diagnosis Plan   1. New onset atrial fibrillation     Brief and episodic in  the setting of acute illness. No AC. Maintaining on BB   2. Primary hypertension     BP controlled on current regimen. Continue with same medication parameters.   3.      History of Stroke/lacunar infarct. On statin medication.     Orders Placed This Encounter   Procedures    ECG 12 Lead     This order was created via procedure documentation     Order Specific Question:   Release to patient     Answer:   Routine Release [[id number]]          Plan:       Follow up in 6 months         Your medication list            Accurate as of November 7, 2023  1:37 PM. If you have any questions, ask your nurse or doctor.                CONTINUE taking these medications        Instructions Last Dose Given Next Dose Due   Aspirin Low Dose 81 MG chewable tablet  Generic drug: aspirin      Chew 1 tablet Daily.       atorvastatin 40 MG tablet  Commonly known as: LIPITOR      Take 1 tablet by mouth Every Night.       azelastine 0.05 % ophthalmic solution  Commonly known as: OPTIVAR      instill 1 drop into both eyes twice a day       CALCIUM 1200 PO      Take  by mouth.       Calcium Citrate-Vitamin D 500-10 MG-MCG chewable tablet      Every 12 (Twelve) Hours.       carvedilol 12.5 MG tablet  Commonly known as: COREG      Take 1 tablet by mouth 2 (Two) Times a Day.       cloNIDine 0.1 MG tablet  Commonly known as: Catapres      Take 1  tablet by mouth Daily As Needed for High Blood Pressure (for BP that is persistently elevated >200/100).       diclofenac 50 MG EC tablet  Commonly known as: VOLTAREN      Take 1 tablet by mouth Every 12 (Twelve) Hours.       fluticasone 50 MCG/ACT nasal spray  Commonly known as: FLONASE           gabapentin 100 MG capsule  Commonly known as: NEURONTIN      TAKE 1 CAPSULE IN THE MORNING AND 1 CAP MID-DAY AND 2 CAPS AT NIGHT       hydroCHLOROthiazide 12.5 MG tablet  Commonly known as: HYDRODIURIL      Take 1 tablet by mouth Daily. Pt takes 1/2 tab qam       meloxicam 15 MG tablet  Commonly known as: MOBIC      Daily.       omeprazole 20 MG capsule  Commonly known as: priLOSEC      TAKE 1 TABLET DAILY 1 HR BEFORE EVENING MEAL.       sucralfate 1 g tablet  Commonly known as: CARAFATE      Take 1 tablet by mouth 4 (Four) Times a Day.       valsartan 160 MG tablet  Commonly known as: DIOVAN      Take 0.5 tablets by mouth every night at bedtime.       Vitamin D3 10 MCG (400 UNIT) capsule      Daily.                  As always, it has been a pleasure to participate in your patient's care.      Sincerely,     Tg MAHMOOD

## 2023-12-09 ENCOUNTER — APPOINTMENT (OUTPATIENT)
Dept: ULTRASOUND IMAGING | Facility: HOSPITAL | Age: 58
End: 2023-12-09
Payer: COMMERCIAL

## 2023-12-09 ENCOUNTER — APPOINTMENT (OUTPATIENT)
Dept: CT IMAGING | Facility: HOSPITAL | Age: 58
End: 2023-12-09
Payer: COMMERCIAL

## 2023-12-09 ENCOUNTER — HOSPITAL ENCOUNTER (EMERGENCY)
Facility: HOSPITAL | Age: 58
Discharge: HOME | End: 2023-12-09
Attending: EMERGENCY MEDICINE
Payer: COMMERCIAL

## 2023-12-09 VITALS
OXYGEN SATURATION: 95 % | DIASTOLIC BLOOD PRESSURE: 75 MMHG | SYSTOLIC BLOOD PRESSURE: 119 MMHG | WEIGHT: 150 LBS | HEIGHT: 66 IN | RESPIRATION RATE: 18 BRPM | TEMPERATURE: 98.1 F | BODY MASS INDEX: 24.11 KG/M2 | HEART RATE: 44 BPM

## 2023-12-09 DIAGNOSIS — R10.9 ABDOMINAL PAIN, UNSPECIFIED ABDOMINAL LOCATION: ICD-10-CM

## 2023-12-09 DIAGNOSIS — K52.9 ENTERITIS: Primary | ICD-10-CM

## 2023-12-09 LAB
ALBUMIN SERPL-MCNC: 3.8 G/DL (ref 3.5–5.2)
ALBUMIN/GLOB SERPL: 1.8 G/DL
ALP SERPL-CCNC: 79 U/L (ref 39–117)
ALT SERPL W P-5'-P-CCNC: 19 U/L (ref 1–33)
ANION GAP SERPL CALCULATED.3IONS-SCNC: 11.3 MMOL/L (ref 5–15)
AST SERPL-CCNC: 20 U/L (ref 1–32)
BASOPHILS # BLD AUTO: 0.09 10*3/MM3 (ref 0–0.2)
BASOPHILS NFR BLD AUTO: 1.1 % (ref 0–1.5)
BILIRUB SERPL-MCNC: 0.4 MG/DL (ref 0–1.2)
BILIRUB UR QL STRIP: NEGATIVE
BUN SERPL-MCNC: 31 MG/DL (ref 8–23)
BUN/CREAT SERPL: 31 (ref 7–25)
CALCIUM SPEC-SCNC: 8.5 MG/DL (ref 8.6–10.5)
CHLORIDE SERPL-SCNC: 108 MMOL/L (ref 98–107)
CLARITY UR: CLEAR
CO2 SERPL-SCNC: 25.7 MMOL/L (ref 22–29)
COLOR UR: YELLOW
CREAT SERPL-MCNC: 1 MG/DL (ref 0.57–1)
D-LACTATE SERPL-SCNC: 1.5 MMOL/L (ref 0.5–2)
DEPRECATED RDW RBC AUTO: 42.3 FL (ref 37–54)
EGFRCR SERPLBLD CKD-EPI 2021: 64.6 ML/MIN/1.73
EOSINOPHIL # BLD AUTO: 0.67 10*3/MM3 (ref 0–0.4)
EOSINOPHIL NFR BLD AUTO: 8.4 % (ref 0.3–6.2)
ERYTHROCYTE [DISTWIDTH] IN BLOOD BY AUTOMATED COUNT: 13 % (ref 12.3–15.4)
GLOBULIN UR ELPH-MCNC: 2.1 GM/DL
GLUCOSE SERPL-MCNC: 134 MG/DL (ref 65–99)
GLUCOSE UR STRIP-MCNC: NEGATIVE MG/DL
HCT VFR BLD AUTO: 39.6 % (ref 34–46.6)
HGB BLD-MCNC: 13.5 G/DL (ref 12–15.9)
HGB UR QL STRIP.AUTO: NEGATIVE
IMM GRANULOCYTES # BLD AUTO: 0.03 10*3/MM3 (ref 0–0.05)
IMM GRANULOCYTES NFR BLD AUTO: 0.4 % (ref 0–0.5)
KETONES UR QL STRIP: NEGATIVE
LEUKOCYTE ESTERASE UR QL STRIP.AUTO: NEGATIVE
LIPASE SERPL-CCNC: 53 U/L (ref 13–60)
LYMPHOCYTES # BLD AUTO: 1.93 10*3/MM3 (ref 0.7–3.1)
LYMPHOCYTES NFR BLD AUTO: 24.3 % (ref 19.6–45.3)
MCH RBC QN AUTO: 30.7 PG (ref 26.6–33)
MCHC RBC AUTO-ENTMCNC: 34.1 G/DL (ref 31.5–35.7)
MCV RBC AUTO: 90 FL (ref 79–97)
MONOCYTES # BLD AUTO: 0.64 10*3/MM3 (ref 0.1–0.9)
MONOCYTES NFR BLD AUTO: 8.1 % (ref 5–12)
NEUTROPHILS NFR BLD AUTO: 4.59 10*3/MM3 (ref 1.7–7)
NEUTROPHILS NFR BLD AUTO: 57.7 % (ref 42.7–76)
NITRITE UR QL STRIP: NEGATIVE
NRBC BLD AUTO-RTO: 0 /100 WBC (ref 0–0.2)
PH UR STRIP.AUTO: 6 [PH] (ref 5–8)
PLATELET # BLD AUTO: 205 10*3/MM3 (ref 140–450)
PMV BLD AUTO: 11.4 FL (ref 6–12)
POTASSIUM SERPL-SCNC: 3.7 MMOL/L (ref 3.5–5.2)
PROT SERPL-MCNC: 5.9 G/DL (ref 6–8.5)
PROT UR QL STRIP: NEGATIVE
RBC # BLD AUTO: 4.4 10*6/MM3 (ref 3.77–5.28)
SODIUM SERPL-SCNC: 145 MMOL/L (ref 136–145)
SP GR UR STRIP: >=1.03 (ref 1–1.03)
UROBILINOGEN UR QL STRIP: NORMAL
WBC NRBC COR # BLD AUTO: 7.95 10*3/MM3 (ref 3.4–10.8)

## 2023-12-09 PROCEDURE — 74177 CT ABD & PELVIS W/CONTRAST: CPT

## 2023-12-09 PROCEDURE — 25510000001 IOPAMIDOL 61 % SOLUTION: Performed by: EMERGENCY MEDICINE

## 2023-12-09 PROCEDURE — 25810000003 SODIUM CHLORIDE 0.9 % SOLUTION: Performed by: EMERGENCY MEDICINE

## 2023-12-09 PROCEDURE — 36415 COLL VENOUS BLD VENIPUNCTURE: CPT

## 2023-12-09 PROCEDURE — 96375 TX/PRO/DX INJ NEW DRUG ADDON: CPT

## 2023-12-09 PROCEDURE — 80053 COMPREHEN METABOLIC PANEL: CPT | Performed by: EMERGENCY MEDICINE

## 2023-12-09 PROCEDURE — 99285 EMERGENCY DEPT VISIT HI MDM: CPT

## 2023-12-09 PROCEDURE — 25010000002 ONDANSETRON PER 1 MG: Performed by: EMERGENCY MEDICINE

## 2023-12-09 PROCEDURE — 83605 ASSAY OF LACTIC ACID: CPT | Performed by: EMERGENCY MEDICINE

## 2023-12-09 PROCEDURE — 85025 COMPLETE CBC W/AUTO DIFF WBC: CPT | Performed by: EMERGENCY MEDICINE

## 2023-12-09 PROCEDURE — 76705 ECHO EXAM OF ABDOMEN: CPT

## 2023-12-09 PROCEDURE — 81003 URINALYSIS AUTO W/O SCOPE: CPT | Performed by: EMERGENCY MEDICINE

## 2023-12-09 PROCEDURE — 25010000002 MORPHINE PER 10 MG: Performed by: EMERGENCY MEDICINE

## 2023-12-09 PROCEDURE — 83690 ASSAY OF LIPASE: CPT | Performed by: EMERGENCY MEDICINE

## 2023-12-09 PROCEDURE — 96374 THER/PROPH/DIAG INJ IV PUSH: CPT

## 2023-12-09 RX ORDER — ONDANSETRON 2 MG/ML
4 INJECTION INTRAMUSCULAR; INTRAVENOUS ONCE
Status: COMPLETED | OUTPATIENT
Start: 2023-12-09 | End: 2023-12-09

## 2023-12-09 RX ORDER — SODIUM CHLORIDE 0.9 % (FLUSH) 0.9 %
10 SYRINGE (ML) INJECTION AS NEEDED
Status: DISCONTINUED | OUTPATIENT
Start: 2023-12-09 | End: 2023-12-09 | Stop reason: HOSPADM

## 2023-12-09 RX ORDER — METOCLOPRAMIDE 5 MG/1
5 TABLET ORAL 3 TIMES DAILY PRN
Qty: 21 TABLET | Refills: 0 | Status: SHIPPED | OUTPATIENT
Start: 2023-12-09

## 2023-12-09 RX ORDER — MORPHINE SULFATE 2 MG/ML
4 INJECTION, SOLUTION INTRAMUSCULAR; INTRAVENOUS ONCE
Status: COMPLETED | OUTPATIENT
Start: 2023-12-09 | End: 2023-12-09

## 2023-12-09 RX ADMIN — MORPHINE SULFATE 4 MG: 2 INJECTION, SOLUTION INTRAMUSCULAR; INTRAVENOUS at 02:39

## 2023-12-09 RX ADMIN — IOPAMIDOL 85 ML: 612 INJECTION, SOLUTION INTRAVENOUS at 04:32

## 2023-12-09 RX ADMIN — ONDANSETRON 4 MG: 2 INJECTION INTRAMUSCULAR; INTRAVENOUS at 02:39

## 2023-12-09 RX ADMIN — SODIUM CHLORIDE 500 ML: 9 INJECTION, SOLUTION INTRAVENOUS at 02:38

## 2023-12-09 NOTE — ED TRIAGE NOTES
Pt c/o upper abd pain that started around 2030. States that it feels like when she had diverticulitis

## 2023-12-09 NOTE — ED PROVIDER NOTES
EMERGENCY DEPARTMENT ENCOUNTER    Room Number:  15/15  PCP: Clem Bush MD  Patient Care Team:  Clem Bush MD as PCP - General  Clem Bush MD as PCP - Family Medicine   Independent Historians: Patient    HPI:  Chief Complaint: Abdominal pain    A complete HPI/ROS/PMH/PSH/SH/FH are unobtainable due to: None    Chronic or social conditions impacting patient care (Social Determinants of Health): None  (Financial Resource Strain / Food Insecurity / Transportation Needs / Physical Activity / Stress / Social Connections / Intimate Partner Violence / Housing Stability)    Context: Emy Medina is a 58 y.o. female who presents to the ED c/o acute abdominal pain going on since today evening.  Patient has any nausea vomiting no diarrhea no chest pain or shortness of breath.  States feels like diverticulitis episode she has had in the past but is much more severe.  Pain does not radiate.  Denies flank extends from midline upper abdomen around to umbilicus.  No dysuria no hematuria.    Review of prior external notes (non-ED) -and- Review of prior external test results outside of this encounter: Reviewed patient's cardiology note from 11/7/2023    Prescription drug monitoring program review:         PAST MEDICAL HISTORY  Active Ambulatory Problems     Diagnosis Date Noted    Chest pain 02/17/2022    Cerebrovascular accident (CVA) 08/02/2022    HTN (hypertension) 08/03/2022    Bradycardia 08/03/2022    FUENTES (acute kidney injury) 08/03/2022    FUENTES (acute kidney injury) 08/03/2022    New onset atrial fibrillation 03/21/2023    Hyperglycemia 03/21/2023    Leukocytosis 03/21/2023    History of pulmonary embolism     Hypokalemia     Pharyngitis      Resolved Ambulatory Problems     Diagnosis Date Noted    No Resolved Ambulatory Problems     Past Medical History:   Diagnosis Date    Arthritis     Hypertension     PE (pulmonary embolism)     Pleurisy          PAST SURGICAL HISTORY  Past Surgical  History:   Procedure Laterality Date    CATARACT EXTRACTION      HYSTERECTOMY      LASIK      SINUS SURGERY           FAMILY HISTORY  Family History   Problem Relation Age of Onset    Stroke Mother     Hypertension Mother     Multiple sclerosis Father          SOCIAL HISTORY  Social History     Socioeconomic History    Marital status:    Tobacco Use    Smoking status: Never    Smokeless tobacco: Never   Vaping Use    Vaping Use: Never used   Substance and Sexual Activity    Alcohol use: No    Drug use: No    Sexual activity: Yes     Partners: Male         ALLERGIES  Amoxicillin-pot clavulanate, Doxycycline, and Sulfa antibiotics        REVIEW OF SYSTEMS  Review of Systems  Included in HPI  All systems reviewed and negative except for those discussed in HPI.      PHYSICAL EXAM    I have reviewed the triage vital signs and nursing notes.    ED Triage Vitals   Temp Heart Rate Resp BP SpO2   12/09/23 0203 12/09/23 0203 12/09/23 0203 12/09/23 0210 12/09/23 0203   98.1 °F (36.7 °C) 64 18 (!) 192/100 98 %      Temp src Heart Rate Source Patient Position BP Location FiO2 (%)   12/09/23 0203 12/09/23 0203 12/09/23 0210 12/09/23 0210 --   Tympanic Monitor Sitting Right arm        Physical Exam  GENERAL: alert, mild acute distress  SKIN: Warm, dry  HENT: Normocephalic, atraumatic  EYES: no scleral icterus  CV: regular rhythm, regular rate  RESPIRATORY: normal effort, lungs clear  ABDOMEN: soft, tender to palpation over mid abdomen, nondistended  MUSCULOSKELETAL: no deformity  NEURO: alert, moves all extremities, follows commands                                                               LAB RESULTS  Recent Results (from the past 24 hour(s))   Lipase    Collection Time: 12/09/23  2:38 AM    Specimen: Blood   Result Value Ref Range    Lipase 53 13 - 60 U/L   Lactic Acid, Plasma    Collection Time: 12/09/23  2:38 AM    Specimen: Blood   Result Value Ref Range    Lactate 1.5 0.5 - 2.0 mmol/L   CBC Auto Differential     Collection Time: 12/09/23  2:38 AM    Specimen: Blood   Result Value Ref Range    WBC 7.95 3.40 - 10.80 10*3/mm3    RBC 4.40 3.77 - 5.28 10*6/mm3    Hemoglobin 13.5 12.0 - 15.9 g/dL    Hematocrit 39.6 34.0 - 46.6 %    MCV 90.0 79.0 - 97.0 fL    MCH 30.7 26.6 - 33.0 pg    MCHC 34.1 31.5 - 35.7 g/dL    RDW 13.0 12.3 - 15.4 %    RDW-SD 42.3 37.0 - 54.0 fl    MPV 11.4 6.0 - 12.0 fL    Platelets 205 140 - 450 10*3/mm3    Neutrophil % 57.7 42.7 - 76.0 %    Lymphocyte % 24.3 19.6 - 45.3 %    Monocyte % 8.1 5.0 - 12.0 %    Eosinophil % 8.4 (H) 0.3 - 6.2 %    Basophil % 1.1 0.0 - 1.5 %    Immature Grans % 0.4 0.0 - 0.5 %    Neutrophils, Absolute 4.59 1.70 - 7.00 10*3/mm3    Lymphocytes, Absolute 1.93 0.70 - 3.10 10*3/mm3    Monocytes, Absolute 0.64 0.10 - 0.90 10*3/mm3    Eosinophils, Absolute 0.67 (H) 0.00 - 0.40 10*3/mm3    Basophils, Absolute 0.09 0.00 - 0.20 10*3/mm3    Immature Grans, Absolute 0.03 0.00 - 0.05 10*3/mm3    nRBC 0.0 0.0 - 0.2 /100 WBC   Comprehensive Metabolic Panel    Collection Time: 12/09/23  3:23 AM    Specimen: Blood   Result Value Ref Range    Glucose 134 (H) 65 - 99 mg/dL    BUN 31 (H) 8 - 23 mg/dL    Creatinine 1.00 0.57 - 1.00 mg/dL    Sodium 145 136 - 145 mmol/L    Potassium 3.7 3.5 - 5.2 mmol/L    Chloride 108 (H) 98 - 107 mmol/L    CO2 25.7 22.0 - 29.0 mmol/L    Calcium 8.5 (L) 8.6 - 10.5 mg/dL    Total Protein 5.9 (L) 6.0 - 8.5 g/dL    Albumin 3.8 3.5 - 5.2 g/dL    ALT (SGPT) 19 1 - 33 U/L    AST (SGOT) 20 1 - 32 U/L    Alkaline Phosphatase 79 39 - 117 U/L    Total Bilirubin 0.4 0.0 - 1.2 mg/dL    Globulin 2.1 gm/dL    A/G Ratio 1.8 g/dL    BUN/Creatinine Ratio 31.0 (H) 7.0 - 25.0    Anion Gap 11.3 5.0 - 15.0 mmol/L    eGFR 64.6 >60.0 mL/min/1.73   Urinalysis With Microscopic If Indicated (No Culture) - Urine, Clean Catch    Collection Time: 12/09/23  5:02 AM    Specimen: Urine, Clean Catch   Result Value Ref Range    Color, UA Yellow Yellow, Straw    Appearance, UA Clear Clear    pH, UA 6.0  5.0 - 8.0    Specific Gravity, UA >=1.030 1.005 - 1.030    Glucose, UA Negative Negative    Ketones, UA Negative Negative    Bilirubin, UA Negative Negative    Blood, UA Negative Negative    Protein, UA Negative Negative    Leuk Esterase, UA Negative Negative    Nitrite, UA Negative Negative    Urobilinogen, UA 1.0 E.U./dL 0.2 - 1.0 E.U./dL       I ordered the above labs and independently reviewed the results.        RADIOLOGY  US Gallbladder    Result Date: 12/9/2023  GALLBLADDER ULTRASOUND  HISTORY: Abdominal pain  COMPARISON: December 9, 2023  TECHNIQUE: Grayscale and color Doppler sonographic images were obtained through the right upper quadrant.  FINDINGS: There is limited visualization of the pancreas. It appeared normal on earlier CT. No focal hepatic lesions are seen. There is no intra or extrahepatic biliary dilatation. Common bile duct measures 4 mm. The patient's gallbladder is distended, but there is no gallbladder wall thickening or pericholecystic fluid. No stones or sludge are seen within the gallbladder. Right kidney measures 10.4 x 4.4 x 5.0 cm. Renal echotexture is normal. There is no hydronephrosis.      There is distention of the gallbladder, without any wall thickening or pericholecystic fluid. No stones or sludge are seen within the gallbladder.  This report was finalized on 12/9/2023 5:38 AM by Dr. Heather Patel M.D on Workstation: [id number]      CT Abdomen Pelvis With Contrast    Result Date: 12/9/2023  CT OF THE ABDOMEN AND PELVIS WITH CONTRAST  HISTORY: Abdominal pain  COMPARISON: September 18, 2023  TECHNIQUE: Axial CT imaging was obtained through the abdomen and pelvis. IV contrast was administered.  FINDINGS: Images through the lung bases demonstrate some scarring. There is a nodule within the left lower lobe, which measures 7 mm. This has been present since at least February 2022, and is felt to be benign. Probable cyst is noted within the left hepatic lobe. The spleen, adrenal  glands, and pancreas are within normal limits. There is a duodenal diverticulum. Gallbladder does appear distended. There is some questionable gallbladder wall thickening. Correlation with gallbladder ultrasound is recommended. The kidneys enhance symmetrically. No hydronephrosis is seen. No distal ureteral or bladder stones are identified. There are aortoiliac calcifications. Uterus is absent. There is colonic diverticulosis. There is no evidence of appendicitis. There is a tiny 1 fat-containing inguinal hernia. There are some prominent loops of small bowel, particularly along the left side of the abdomen. There is also some fecalization of small bowel contents, without clear transition point. Appearance may reflect enteritis with associated ileus. No pneumatosis or free air is seen. There is stable compression deformity noted at L1.       1. Gallbladder appears more distended on the current examination, and there is a suggestion of some gallbladder wall thickening. Consideration for gallbladder ultrasound is recommended. 2. Patient does have some prominent loops of small bowel within the left side of the abdomen. Enteritis with some associated ileus is not excluded.  Radiation dose reduction techniques were utilized, including automated exposure control and exposure modulation based on body size.   This report was finalized on 12/9/2023 4:53 AM by Dr. Heather Patel M.D on Workstation: [id number]       I ordered the above noted radiological studies. Reviewed by me and discussed with radiologist.  See dictation for official radiology interpretation.      PROCEDURES    Procedures      MEDICATIONS GIVEN IN ER    Medications   sodium chloride 0.9 % flush 10 mL (has no administration in time range)   morphine injection 4 mg (4 mg Intravenous Given 12/9/23 1369)   ondansetron (ZOFRAN) injection 4 mg (4 mg Intravenous Given 12/9/23 8869)   sodium chloride 0.9 % bolus 500 mL (0 mL Intravenous Stopped 12/9/23 5027)    iopamidol (ISOVUE-300) 61 % injection 100 mL (85 mL Intravenous Given by Other 12/9/23 8575)         ORDERS PLACED DURING THIS VISIT:  Orders Placed This Encounter   Procedures    CT Abdomen Pelvis With Contrast    US Gallbladder    Comprehensive Metabolic Panel    Lipase    Urinalysis With Microscopic If Indicated (No Culture) - Urine, Clean Catch    Lactic Acid, Plasma    CBC Auto Differential    Monitor Blood Pressure    Pulse Oximetry, Continuous    Insert Peripheral IV    CBC & Differential         PROGRESS, DATA ANALYSIS, CONSULTS, AND MEDICAL DECISION MAKING    All labs have been independently interpreted by me.  All radiology studies have been reviewed by me and discussed with radiologist dictating the report.   EKG's independently viewed and interpreted by me.  Discussion below represents my analysis of pertinent findings related to patient's condition, differential diagnosis, treatment plan and final disposition.    My differential diagnosis for abdominal pain for a male includes but is not limited to:  Gastritis, gastroenteritis, peptic ulcer disease, GERD, esophageal perforation, acute appendicitis, mesenteric adenitis, Meckel’s diverticulum, epiploic appendagitis, diverticulitis, colon cancer, ulcerative colitis, Crohn’s disease, intussusception, small bowel obstruction, adhesions, ischemic bowel, perforated viscus, ileus, obstipation, biliary colic, cholecystitis, cholelithiasis, hepatitis, pancreatitis, common bile duct obstruction, cholangitis, bile leak, splenic trauma, splenic rupture, splenic infarction, splenic abscess, abdominal abscess, ascites, spontaneous bacterial peritonitis, hernia, UTI, cystitis, prostatitis, ureterolithiasis, urinary obstruction, testicular torsion, AAA, myocardial infarction, pneumonia, cancer, porphyria, DKA, medications, sickle cell, viral syndrome, zoster      Clinical Scores:              ED Course as of 12/09/23 0600   Sat Dec 09, 2023   0425 Creatinine: 1.00  [TJ]   0426 Potassium: 3.7 [TJ]   0426 Lactate: 1.5 [TJ]   0426 Lipase: 53 [TJ]   0426 WBC: 7.95 [TJ]   0426 Hemoglobin: 13.5 [TJ]   0426 Platelets: 205 [TJ]   0501 Reevaluation: Patient is feeling better repeat exam does have small amount of right upper quadrant tenderness.  I have updated her regarding imaging and lab results.  Will obtain ultrasound of right upper quadrant. [TJ]   0559 Patient is abdominal pain is not principally located over the right upper quadrant.  Patient's labs are reassuring.  Ultrasound only shows some distention of the gallbladder no signs of convincing cholecystitis.  I discussed findings with the patient.  She is feeling better.  Her clinical presentation is more consistent with enteritis.  I will give outpatient general surgery referral.  I have also given her strong return instructions.  She is agreeable with this plan. [TJ]      ED Course User Index  [TJ] Saroj Coley MD             PPE: The patient wore a mask and I wore an N95 mask throughout the entire patient encounter.       AS OF 06:00 EST VITALS:    BP - 124/79  HR - (!) 44  TEMP - 98.1 °F (36.7 °C) (Tympanic)  O2 SATS - 95%        DIAGNOSIS  Final diagnoses:   Enteritis   Abdominal pain, unspecified abdominal location         DISPOSITION  ED Disposition       ED Disposition   Discharge    Condition   Stable    Comment   --                  Note Disclaimer: At [facility], we believe that sharing information builds trust and better relationships. You are receiving this note because you recently visited [facility]. It is possible you will see health information before a provider has talked with you about it. This kind of information can be easy to misunderstand. To help you fully understand what it means for your health, we urge you to discuss this note with your provider.         Saroj Coley MD  12/09/23 0600

## 2023-12-11 RX ORDER — HYDROCHLOROTHIAZIDE 25 MG/1
25 TABLET ORAL DAILY
Qty: 90 TABLET | Refills: 1 | Status: SHIPPED | OUTPATIENT
Start: 2023-12-11

## 2023-12-20 DIAGNOSIS — M54.50 LOW BACK PAIN WITHOUT SCIATICA, UNSPECIFIED BACK PAIN LATERALITY, UNSPECIFIED CHRONICITY: ICD-10-CM

## 2023-12-21 RX ORDER — GABAPENTIN 100 MG/1
CAPSULE ORAL
Qty: 120 CAPSULE | Refills: 0 | Status: SHIPPED | OUTPATIENT
Start: 2023-12-21

## 2024-02-27 ENCOUNTER — CLINICAL SUPPORT (OUTPATIENT)
Dept: CARDIOLOGY | Facility: CLINIC | Age: 59
End: 2024-02-27
Payer: COMMERCIAL

## 2024-02-27 NOTE — PROGRESS NOTES
"UPDATE with info after pt was checked in office:    1:50pm  I randal w/ Dr. Celis, Dr. Echols in Cath Lab, we asked pt to stay hydrated and stay off her BP meds for now, call the office/or send [employer] message on Thursday with BP Readings. Pt understood the instructions and knows to go to the ER if she feels worse.    Alena DOUGLAS  2/27/24            Pt came in for BP check and to compare with her machine from home. She is feeling lightheaded, weak, and just not feeling \"right\".   Her BP's at home yday were:    10:20AM  149/91, Pt took HCTZ 25mg and Carvedilol 12.5mg    11:15AM  73/55    11:35AM  130/80    11:50am  92/55    Pt went to PCP at 2:30 for appt, her BP in the office with the PCP was 92/55.    8:30pm pt took Cipro for sinus infection and bronchitis (which PCP gave her earlier in the day)    9:00pm  97/71    She hasn't had any BP meds since y-day morning at 10:20am.        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

## 2024-03-04 NOTE — PROGRESS NOTES
DOS: 3/6/2024  NAME: Eym Medina   : 1965  PCP: Clem Bush MD       Patient accompanied by  who does not provide any portion of history.     Neurological Problem and Interval History:  58 y.o.  female with a known history of pulmonary embolism (previously on anticoagulation), PAF (provoked) hypertension and back pain (upcoming back surgery in July) recently started on Prolia who I am seeing today in follow-up for left pontine stroke see prior notes regarding workup.  Patient was last seen in follow-up with me May 2023 for the same.  At that time, she denies any new signs and/or symptoms of stroke.  She remains compliant with aspirin 81 mg daily and statin 40 mg daily.  She has had no significant medication changes other than the addition of Prolia.  She has had 2 ED visits since last evaluation 2023 for hypertension/malaise and 2023 for abdominal pain she was discharged after workup and evaluation for each of these visits.    She denies any issues with blood pressure today /62 heart rate 47.  She denies any issues with mood specifically no concern for depression and/or PBA.  She continues to work full-time in [employer].  She denies any other complaints or concerns.  At last appointment recommended completion MEGAN evaluation which patient has not completed although she has seen sleep medicine-risk and benefits of untreated sleep apnea discussed she verbalizes understanding and agrees to reconsider.  She denies any new complaints and symptoms.  I will plan to see her annually and/or as needed moving forward.      Hypercoag workup:  Antithrombin III: 14  Lupus anticoagulant: No lupus anticoagulant detected  Homocystine: 13.4  Anticardiolipin antibody: Normal (22)  Protein C and S antigen: 114/82  Factor V Leiden: Normal  Fibrinogen: Normal at 321  Factor II DNA: Normal  Antiphospholipid antibody: No antiphospholipid antibody detected  Viscosity: 1.5 within  normal limit    Current Outpatient Medications:     aspirin 81 MG chewable tablet, Chew 1 tablet Daily., Disp: 90 tablet, Rfl: 0    atorvastatin (LIPITOR) 40 MG tablet, Take 1 tablet by mouth Every Night., Disp: 30 tablet, Rfl: 0    azelastine (OPTIVAR) 0.05 % ophthalmic solution, instill 1 drop into both eyes twice a day, Disp: , Rfl:     Calcium Carbonate-Vit D-Min (CALCIUM 1200 PO), Take  by mouth., Disp: , Rfl:     Calcium Citrate-Vitamin D 500-10 MG-MCG chewable tablet, Every 12 (Twelve) Hours., Disp: , Rfl:     carvedilol (COREG) 12.5 MG tablet, Take 1 tablet by mouth 2 (Two) Times a Day., Disp: 180 tablet, Rfl: 3    cetirizine (zyrTEC) 10 MG tablet, Take 1 tablet by mouth Daily., Disp: , Rfl:     Cholecalciferol (Vitamin D3) 10 MCG (400 UNIT) capsule, Daily., Disp: , Rfl:     ciprofloxacin (CIPRO) 500 MG tablet, Take 1 tablet by mouth 2 (Two) Times a Day., Disp: , Rfl:     cloNIDine (Catapres) 0.1 MG tablet, Take 1 tablet by mouth Daily As Needed for High Blood Pressure (for BP that is persistently elevated >200/100)., Disp: 30 tablet, Rfl: 0    denosumab (Prolia) 60 MG/ML solution prefilled syringe syringe, Inject 1 mL under the skin into the appropriate area as directed Every 6 (Six) Months., Disp: , Rfl:     diclofenac (VOLTAREN) 50 MG EC tablet, Take 1 tablet by mouth Every 12 (Twelve) Hours., Disp: , Rfl:     dicyclomine (BENTYL) 20 MG tablet, Take 1 tablet by mouth., Disp: , Rfl:     gabapentin (NEURONTIN) 100 MG capsule, TAKE 1 CAPSULE IN THE MORNING AND 1 CAP MID-DAY AND 2 CAPS AT NIGHT, Disp: 120 capsule, Rfl: 0    hydroCHLOROthiazide (HYDRODIURIL) 12.5 MG tablet, Take 1 tablet by mouth Daily. Pt takes 1/2 tab qam, Disp: , Rfl:     metoclopramide (REGLAN) 5 MG tablet, Take 1 tablet by mouth 3 (Three) Times a Day As Needed (abd pain)., Disp: 21 tablet, Rfl: 0    omeprazole (priLOSEC) 20 MG capsule, TAKE 1 TABLET DAILY 1 HR BEFORE EVENING MEAL., Disp: , Rfl:     valsartan (DIOVAN) 160 MG tablet, Take  "0.5 tablets by mouth every night at bedtime., Disp: 180 tablet, Rfl: 1    \"The following portions of the patient's history were reviewed and updated as appropriate: allergies, current medications, past family history, past medical history, past social history, past surgical history and problem list.\"  Review and Interpretation of Imaging:  Reviewed workup for ER visits  Laboratory Results:             Lab Results   Component Value Date    HGBA1C 5.90 (H) 03/22/2023         Lab Results   Component Value Date    CHOL 89 12/08/2022    CHOL 127 08/03/2022         Lab Results   Component Value Date    HDL 35 (L) 12/08/2022    HDL 27 (L) 08/03/2022    HDL 33 (L) 06/04/2020         Lab Results   Component Value Date    LDL 37 12/08/2022    LDL 77 08/03/2022     06/04/2020         Lab Results   Component Value Date    TRIG 82 12/08/2022    TRIG 128 08/03/2022    TRIG 138 06/04/2020     No results found for: \"RPR\"  Lab Results   Component Value Date    TSH 0.494 03/21/2023         Physical Examination: NIHSS: 0 mRS: 0  General Appearance:   Well developed, well nourished, well groomed, alert, and cooperative.  HEENT:   Normocephalic.    Neck and Spine:  Normal range of motion.  Normal alignment. No mass or tenderness. No bruits.  Cardiac: Regular rate and rhythm. No murmurs.  Peripheral Vasculature: Radial and pedal pulses are equal and symmetric.   Extremities:    No edema or deformities. Normal joint ROM.  Skin:    No rashes or birth marks.    Neurological examination:  Higher Integrative  Function:   Oriented to time, place and person. Normal registration, recall, attention span and concentration. Normal language including comprehension, spontaneous speech, repetition, reading, writing, naming and vocabulary. No neglect with normal visual-spatial function and construction. Normal fund of knowledge and higher integrative function.  CN II:    Pupils are equal, round, and reactive to light. Normal visual acuity and " visual fields.    CN III IV VI:   Extraocular movements are full without nystagmus.   CN V:    Normal facial sensation and strength of muscles of mastication.  CN VII:   Facial movements are symmetric. No weakness.  CN VIII:   Auditory acuity is normal.  CN IX & X:   Symmetric palatal movement.  CN XI:    Sternocleidomastoid and trapezius are normal.  No weakness.  CN XII:   The tongue is midline.  No atrophy or fasciculations.  Motor:    Normal muscle strength, bulk and tone in upper and lower extremities.    Sensation:   Normal to light touch in arms and legs.  Station and Gait:  Normal gait and station.    Coordination:  Finger to nose test shows no dysmetria.        Diagnoses:    1.  History of left pontine stroke etiology felt to be secondary to uncontrolled risk factors specifically hypertension also in setting of isolated episode of A-fib-given history of pulmonary embolism recommended hypercoagulable panel which was unremarkable and also recommended outpatient MEGAN evaluation which has not yet been completed to further exclude other possibilities for source of stroke  2.  At risk for obstructive sleep apnea  3.  Chronic back pain; on gabapentin upcoming surgery planned for July 2024  4.  History of pulmonary embolism    Plan:   Continue gabapentin/aspirin/statin as written   Commend completing MEGAN evaluation   Blood pressure control to <130/80   Goal LDL <70-recommend high dose statins-    Serum glucose < 140   Call 911 for stroke any stroke symptoms   Follow-up annually and/or as needed  Diagnoses and all orders for this visit:    1. History of stroke (Primary)    2. History of pulmonary embolism    3. Low back pain without sciatica, unspecified back pain laterality, unspecified chronicity    4. At risk for obstructive sleep apnea

## 2024-03-05 ENCOUNTER — OFFICE VISIT (OUTPATIENT)
Dept: CARDIOLOGY | Facility: CLINIC | Age: 59
End: 2024-03-05
Payer: COMMERCIAL

## 2024-03-05 ENCOUNTER — OFFICE VISIT (OUTPATIENT)
Dept: NEUROLOGY | Facility: CLINIC | Age: 59
End: 2024-03-05
Payer: COMMERCIAL

## 2024-03-05 VITALS
WEIGHT: 152 LBS | BODY MASS INDEX: 24.53 KG/M2 | OXYGEN SATURATION: 97 % | HEART RATE: 47 BPM | SYSTOLIC BLOOD PRESSURE: 112 MMHG | DIASTOLIC BLOOD PRESSURE: 62 MMHG

## 2024-03-05 VITALS
HEART RATE: 49 BPM | WEIGHT: 153 LBS | DIASTOLIC BLOOD PRESSURE: 72 MMHG | HEIGHT: 66 IN | SYSTOLIC BLOOD PRESSURE: 108 MMHG | BODY MASS INDEX: 24.59 KG/M2

## 2024-03-05 DIAGNOSIS — Z86.711 HISTORY OF PULMONARY EMBOLISM: ICD-10-CM

## 2024-03-05 DIAGNOSIS — Z86.73 HISTORY OF STROKE: Primary | ICD-10-CM

## 2024-03-05 DIAGNOSIS — M54.50 LOW BACK PAIN WITHOUT SCIATICA, UNSPECIFIED BACK PAIN LATERALITY, UNSPECIFIED CHRONICITY: ICD-10-CM

## 2024-03-05 DIAGNOSIS — Z91.89 AT RISK FOR OBSTRUCTIVE SLEEP APNEA: ICD-10-CM

## 2024-03-05 DIAGNOSIS — I10 ESSENTIAL HYPERTENSION: Primary | ICD-10-CM

## 2024-03-05 PROCEDURE — 99214 OFFICE O/P EST MOD 30 MIN: CPT | Performed by: INTERNAL MEDICINE

## 2024-03-05 PROCEDURE — 93000 ELECTROCARDIOGRAM COMPLETE: CPT | Performed by: INTERNAL MEDICINE

## 2024-03-05 RX ORDER — DENOSUMAB 60 MG/ML
60 INJECTION SUBCUTANEOUS
COMMUNITY

## 2024-03-05 RX ORDER — CIPROFLOXACIN 500 MG/1
500 TABLET, FILM COATED ORAL 2 TIMES DAILY
COMMUNITY
Start: 2024-02-26 | End: 2024-03-07

## 2024-03-05 RX ORDER — DICYCLOMINE HCL 20 MG
20 TABLET ORAL
COMMUNITY
Start: 2024-02-26

## 2024-03-05 RX ORDER — CETIRIZINE HYDROCHLORIDE 10 MG/1
10 TABLET ORAL DAILY
COMMUNITY

## 2024-03-05 NOTE — PROGRESS NOTES
Subjective:     Encounter Date: 03/05/24      Patient ID: Emy Medina is a 58 y.o. female.    Chief Complaint: Hypertension  HPI:   This is a 58-year-old woman with hypertension, PE, CVA.  When I met her in April 2022 she had recently been in the emergency with chest pain.  At that time she was severely hypertensive.  We had been working on her blood pressure and had actually pretty well controlled. Then in August 2022 she noted some unilateral weakness in her arm and leg.  She waited a few days but eventually presented to the emergency room.  At that time she was found to have a lacunar stroke.      She returns today for an urgent visit.  About a week ago she became acutely ill and was hypotensive.  She came to the office for blood pressure check her blood pressure was 90s over 70s.  We held her medications.  She was started on Cipro for acute sinusitis/bronchitis.  Previous to that she was treated with Keflex with improvement briefly.  Last Thursday  her blood pressure started to improve and she restarted her antihypertensives: Coreg, valsartan, HCTZ.  Since then she has been feeling well.  Her blood pressure has been fluctuating but overall is well-controlled.  She continues to have sinus pain and a foul smell in the left nostril    She works in the cafeteria of a school.  She is .  She has never smoked.  She does not drink alcohol.  Her history of PE was back in 2016 which occurred after hysterectomy.  She was treated with anticoagulation for 6 months.  The following portions of the patient's history were reviewed and updated as appropriate: allergies, current medications, past family history, past medical history, past social history, past surgical history and problem list.     REVIEW OF SYSTEMS:   All systems reviewed.  Pertinent positives identified in HPI.  All other systems are negative.    Past Medical History:   Diagnosis Date    Arthritis     Hypertension     PE (pulmonary embolism)      Pleurisy        Family History   Problem Relation Age of Onset    Stroke Mother     Hypertension Mother     Multiple sclerosis Father        Social History     Socioeconomic History    Marital status:    Tobacco Use    Smoking status: Never    Smokeless tobacco: Never   Vaping Use    Vaping status: Never Used   Substance and Sexual Activity    Alcohol use: No    Drug use: No    Sexual activity: Yes     Partners: Male       Allergies   Allergen Reactions    Amoxicillin-Pot Clavulanate Diarrhea     Stomach cramping     Doxycycline     Sulfa Antibiotics Hives       Past Surgical History:   Procedure Laterality Date    CATARACT EXTRACTION      HYSTERECTOMY      LASIK      SINUS SURGERY           ECG 12 Lead    Date/Time: 3/5/2024 3:30 PM  Performed by: Leighann Echols MD    Authorized by: Leighann Echols MD  Comparison: compared with previous ECG   Similar to previous ECG  Rhythm: sinus rhythm  Conduction: conduction normal  ST Segments: ST segments normal  Other findings: left ventricular hypertrophy    Clinical impression: non-specific ECG             Objective:         PHYSICAL EXAM:  GEN: VSS, no distress,   Eyes: normal sclera, normal lids and lashes  HENT: moist mucus membranes,   Respiratory: CTAB, no rales or wheezes  CV: RRR, no murmurs, , +2 DP and 2+ carotid pulses b/l  GI: NABS, soft,  Nontender, nondistended  MSK: no edema, no scoliosis or kyphosis  Skin: no rash, warm, dry  Heme/Lymph: no bruising or bleeding  Psych: organized thought, normal behavior and affect  Neuro: Cranial nerves grossly intact, Alert and Oriented x 3.         Assessment:          Diagnosis Plan   1. Essential hypertension  ECG 12 Lead             Plan:       1.  Hypertension: Coreg 12.5 twice daily.  She does not tolerate higher doses due to bradycardia.  Valsartan 80 nightly.  HCTZ 12.5 daily.    Briefly held antihypertensives due to acute sinusitis/hypotension.  Encouraged hydration.  Continue current medical therapy.  2.   Atrial fibrillation: Provoked by acute pharyngitis/volume depletion.  2-week monitor is pending.  No anticoagulation at this point.  3.  History of PE 2016 postoperative hysterectomy  4.  CVA August 2022: Aspirin, Lipitor 40, blood pressure control    Dr. Bush, thank you very much for referring this kind patient to me. Please call me with any questions or concerns. I will see the patient again in the office in 2 months.          Leighann Echols MD  03/05/24  [facility]    Outpatient Encounter Medications as of 3/5/2024   Medication Sig Dispense Refill    aspirin 81 MG chewable tablet Chew 1 tablet Daily. 90 tablet 0    atorvastatin (LIPITOR) 40 MG tablet Take 1 tablet by mouth Every Night. 30 tablet 0    azelastine (OPTIVAR) 0.05 % ophthalmic solution instill 1 drop into both eyes twice a day      Calcium Carbonate-Vit D-Min (CALCIUM 1200 PO) Take  by mouth.      Calcium Citrate-Vitamin D 500-10 MG-MCG chewable tablet Every 12 (Twelve) Hours.      carvedilol (COREG) 12.5 MG tablet Take 1 tablet by mouth 2 (Two) Times a Day. 180 tablet 3    Cholecalciferol (Vitamin D3) 10 MCG (400 UNIT) capsule Daily.      cloNIDine (Catapres) 0.1 MG tablet Take 1 tablet by mouth Daily As Needed for High Blood Pressure (for BP that is persistently elevated >200/100). 30 tablet 0    diclofenac (VOLTAREN) 50 MG EC tablet Take 1 tablet by mouth Every 12 (Twelve) Hours.      fluticasone (FLONASE) 50 MCG/ACT nasal spray       gabapentin (NEURONTIN) 100 MG capsule TAKE 1 CAPSULE IN THE MORNING AND 1 CAP MID-DAY AND 2 CAPS AT NIGHT 120 capsule 0    hydroCHLOROthiazide (HYDRODIURIL) 12.5 MG tablet Take 1 tablet by mouth Daily. Pt takes 1/2 tab qam      hydroCHLOROthiazide (HYDRODIURIL) 25 MG tablet TAKE 1 TABLET BY MOUTH EVERY DAY 90 tablet 1    meloxicam (MOBIC) 15 MG tablet Daily.      metoclopramide (REGLAN) 5 MG tablet Take 1 tablet by mouth 3 (Three) Times a Day As Needed (abd pain). 21 tablet 0    omeprazole  (priLOSEC) 20 MG capsule TAKE 1 TABLET DAILY 1 HR BEFORE EVENING MEAL.      sucralfate (CARAFATE) 1 g tablet Take 1 tablet by mouth 4 (Four) Times a Day. 60 tablet 0    valsartan (DIOVAN) 160 MG tablet Take 0.5 tablets by mouth every night at bedtime. 180 tablet 1     No facility-administered encounter medications on file as of 3/5/2024.

## 2024-05-08 ENCOUNTER — HOSPITAL ENCOUNTER (EMERGENCY)
Facility: HOSPITAL | Age: 59
Discharge: HOME | End: 2024-05-09
Attending: EMERGENCY MEDICINE
Payer: COMMERCIAL

## 2024-05-08 DIAGNOSIS — M54.50 LOW BACK PAIN WITHOUT SCIATICA, UNSPECIFIED BACK PAIN LATERALITY, UNSPECIFIED CHRONICITY: ICD-10-CM

## 2024-05-08 DIAGNOSIS — R09.1 PLEURISY: Primary | ICD-10-CM

## 2024-05-08 LAB
ALBUMIN SERPL-MCNC: 4.3 G/DL (ref 3.5–5.2)
ALBUMIN/GLOB SERPL: 1.5 G/DL
ALP SERPL-CCNC: 70 U/L (ref 39–117)
ALT SERPL W P-5'-P-CCNC: 21 U/L (ref 1–33)
ANION GAP SERPL CALCULATED.3IONS-SCNC: 10.8 MMOL/L (ref 5–15)
AST SERPL-CCNC: 20 U/L (ref 1–32)
BASOPHILS # BLD AUTO: 0.08 10*3/MM3 (ref 0–0.2)
BASOPHILS NFR BLD AUTO: 0.8 % (ref 0–1.5)
BILIRUB SERPL-MCNC: 0.4 MG/DL (ref 0–1.2)
BUN SERPL-MCNC: 27 MG/DL (ref 8–23)
BUN/CREAT SERPL: 29.7 (ref 7–25)
CALCIUM SPEC-SCNC: 9.8 MG/DL (ref 8.6–10.5)
CHLORIDE SERPL-SCNC: 105 MMOL/L (ref 98–107)
CO2 SERPL-SCNC: 27.2 MMOL/L (ref 22–29)
CREAT SERPL-MCNC: 0.91 MG/DL (ref 0.57–1)
DEPRECATED RDW RBC AUTO: 44 FL (ref 37–54)
EGFRCR SERPLBLD CKD-EPI 2021: 71.9 ML/MIN/1.73
EOSINOPHIL # BLD AUTO: 0.77 10*3/MM3 (ref 0–0.4)
EOSINOPHIL NFR BLD AUTO: 7.7 % (ref 0.3–6.2)
ERYTHROCYTE [DISTWIDTH] IN BLOOD BY AUTOMATED COUNT: 13.4 % (ref 12.3–15.4)
GLOBULIN UR ELPH-MCNC: 2.8 GM/DL
GLUCOSE SERPL-MCNC: 109 MG/DL (ref 65–99)
HCT VFR BLD AUTO: 38.3 % (ref 34–46.6)
HGB BLD-MCNC: 12.6 G/DL (ref 12–15.9)
IMM GRANULOCYTES # BLD AUTO: 0.03 10*3/MM3 (ref 0–0.05)
IMM GRANULOCYTES NFR BLD AUTO: 0.3 % (ref 0–0.5)
LYMPHOCYTES # BLD AUTO: 2.45 10*3/MM3 (ref 0.7–3.1)
LYMPHOCYTES NFR BLD AUTO: 24.5 % (ref 19.6–45.3)
MCH RBC QN AUTO: 29.8 PG (ref 26.6–33)
MCHC RBC AUTO-ENTMCNC: 32.9 G/DL (ref 31.5–35.7)
MCV RBC AUTO: 90.5 FL (ref 79–97)
MONOCYTES # BLD AUTO: 0.75 10*3/MM3 (ref 0.1–0.9)
MONOCYTES NFR BLD AUTO: 7.5 % (ref 5–12)
NEUTROPHILS NFR BLD AUTO: 5.91 10*3/MM3 (ref 1.7–7)
NEUTROPHILS NFR BLD AUTO: 59.2 % (ref 42.7–76)
NRBC BLD AUTO-RTO: 0 /100 WBC (ref 0–0.2)
NT-PROBNP SERPL-MCNC: 750 PG/ML (ref 0–900)
PLATELET # BLD AUTO: 201 10*3/MM3 (ref 140–450)
PMV BLD AUTO: 10.5 FL (ref 6–12)
POTASSIUM SERPL-SCNC: 3.7 MMOL/L (ref 3.5–5.2)
PROT SERPL-MCNC: 7.1 G/DL (ref 6–8.5)
RBC # BLD AUTO: 4.23 10*6/MM3 (ref 3.77–5.28)
SODIUM SERPL-SCNC: 143 MMOL/L (ref 136–145)
TROPONIN T SERPL HS-MCNC: 11 NG/L
WBC NRBC COR # BLD AUTO: 9.99 10*3/MM3 (ref 3.4–10.8)

## 2024-05-08 PROCEDURE — 93005 ELECTROCARDIOGRAM TRACING: CPT | Performed by: EMERGENCY MEDICINE

## 2024-05-08 PROCEDURE — 25010000002 MORPHINE PER 10 MG: Performed by: EMERGENCY MEDICINE

## 2024-05-08 PROCEDURE — 25010000002 ONDANSETRON PER 1 MG: Performed by: EMERGENCY MEDICINE

## 2024-05-08 PROCEDURE — 80053 COMPREHEN METABOLIC PANEL: CPT | Performed by: EMERGENCY MEDICINE

## 2024-05-08 PROCEDURE — 96375 TX/PRO/DX INJ NEW DRUG ADDON: CPT

## 2024-05-08 PROCEDURE — 84484 ASSAY OF TROPONIN QUANT: CPT | Performed by: EMERGENCY MEDICINE

## 2024-05-08 PROCEDURE — 96374 THER/PROPH/DIAG INJ IV PUSH: CPT

## 2024-05-08 PROCEDURE — 83880 ASSAY OF NATRIURETIC PEPTIDE: CPT | Performed by: EMERGENCY MEDICINE

## 2024-05-08 PROCEDURE — 85025 COMPLETE CBC W/AUTO DIFF WBC: CPT | Performed by: EMERGENCY MEDICINE

## 2024-05-08 PROCEDURE — 99285 EMERGENCY DEPT VISIT HI MDM: CPT

## 2024-05-08 RX ORDER — SODIUM CHLORIDE 0.9 % (FLUSH) 0.9 %
10 SYRINGE (ML) INJECTION AS NEEDED
Status: DISCONTINUED | OUTPATIENT
Start: 2024-05-08 | End: 2024-05-09 | Stop reason: HOSPADM

## 2024-05-08 RX ORDER — ONDANSETRON 2 MG/ML
4 INJECTION INTRAMUSCULAR; INTRAVENOUS ONCE
Status: COMPLETED | OUTPATIENT
Start: 2024-05-08 | End: 2024-05-08

## 2024-05-08 RX ORDER — MORPHINE SULFATE 2 MG/ML
4 INJECTION, SOLUTION INTRAMUSCULAR; INTRAVENOUS ONCE
Status: COMPLETED | OUTPATIENT
Start: 2024-05-08 | End: 2024-05-08

## 2024-05-08 RX ADMIN — MORPHINE SULFATE 4 MG: 2 INJECTION, SOLUTION INTRAMUSCULAR; INTRAVENOUS at 22:54

## 2024-05-08 RX ADMIN — ONDANSETRON 4 MG: 2 INJECTION INTRAMUSCULAR; INTRAVENOUS at 22:54

## 2024-05-09 ENCOUNTER — APPOINTMENT (OUTPATIENT)
Dept: CT IMAGING | Facility: HOSPITAL | Age: 59
End: 2024-05-09
Payer: COMMERCIAL

## 2024-05-09 VITALS
DIASTOLIC BLOOD PRESSURE: 76 MMHG | HEART RATE: 46 BPM | WEIGHT: 155 LBS | RESPIRATION RATE: 18 BRPM | TEMPERATURE: 98.1 F | SYSTOLIC BLOOD PRESSURE: 136 MMHG | BODY MASS INDEX: 24.91 KG/M2 | HEIGHT: 66 IN | OXYGEN SATURATION: 90 %

## 2024-05-09 PROCEDURE — 71275 CT ANGIOGRAPHY CHEST: CPT

## 2024-05-09 PROCEDURE — 25510000001 IOPAMIDOL PER 1 ML: Performed by: EMERGENCY MEDICINE

## 2024-05-09 RX ORDER — HYDROCODONE BITARTRATE AND ACETAMINOPHEN 7.5; 325 MG/1; MG/1
1 TABLET ORAL ONCE
Status: COMPLETED | OUTPATIENT
Start: 2024-05-09 | End: 2024-05-09

## 2024-05-09 RX ORDER — HYDROCODONE BITARTRATE AND ACETAMINOPHEN 7.5; 325 MG/1; MG/1
1 TABLET ORAL EVERY 6 HOURS PRN
Qty: 10 TABLET | Refills: 0 | Status: SHIPPED | OUTPATIENT
Start: 2024-05-09

## 2024-05-09 RX ADMIN — HYDROCODONE BITARTRATE AND ACETAMINOPHEN 1 TABLET: 7.5; 325 TABLET ORAL at 00:23

## 2024-05-09 RX ADMIN — IOPAMIDOL 95 ML: 755 INJECTION, SOLUTION INTRAVENOUS at 00:14

## 2024-05-09 NOTE — ED PROVIDER NOTES
EMERGENCY DEPARTMENT ENCOUNTER  Room Number:  22/22  PCP: Clem Bush MD  Independent Historians: Patient      HPI:  Chief Complaint: had concerns including Pain With Breathing.     A complete HPI/ROS/PMH/PSH/SH/FH are unobtainable due to: Nothing      Context: Emy Medina is a 59 y.o. female with a medical history of pleurisy, pulmonary embolism, hypertension who presents to the ED c/o acute sharp pain in the right side of her chest and right side of her ribs onset this morning.  She states that the pain is worse with deep breathing.  She denies shortness of breath but states it is difficult to take a deep breath because of the pain.  She denies leg pain or leg swelling.  She states that she has had pleurisy previously and this feels similar.  She also has a history of pulmonary embolism after her hysterectomy in the past.  She is not currently on anticoagulation.  She denies fever, cough, chills.      Review of prior external notes (non-ED) -and- Review of prior external test results outside of this encounter: I reviewed cardiology progress note from 3/5/2024.  Patient does have a history of atrial fibrillation that was provoked by acute pharyngitis.  She is not on anticoagulation at this time.        PAST MEDICAL HISTORY  Active Ambulatory Problems     Diagnosis Date Noted    Chest pain 02/17/2022    Cerebrovascular accident (CVA) 08/02/2022    HTN (hypertension) 08/03/2022    Bradycardia 08/03/2022    FUENTES (acute kidney injury) 08/03/2022    FUENTES (acute kidney injury) 08/03/2022    New onset atrial fibrillation 03/21/2023    Hyperglycemia 03/21/2023    Leukocytosis 03/21/2023    History of pulmonary embolism     Hypokalemia     Pharyngitis      Resolved Ambulatory Problems     Diagnosis Date Noted    No Resolved Ambulatory Problems     Past Medical History:   Diagnosis Date    Arthritis     Hypertension     PE (pulmonary embolism)     Pleurisy          PAST SURGICAL HISTORY  Past Surgical History:    Procedure Laterality Date    CATARACT EXTRACTION      HYSTERECTOMY      LASIK      SINUS SURGERY           FAMILY HISTORY  Family History   Problem Relation Age of Onset    Stroke Mother     Hypertension Mother     Multiple sclerosis Father          SOCIAL HISTORY  Social History     Socioeconomic History    Marital status:    Tobacco Use    Smoking status: Never    Smokeless tobacco: Never   Vaping Use    Vaping status: Never Used   Substance and Sexual Activity    Alcohol use: No    Drug use: No    Sexual activity: Yes     Partners: Male         ALLERGIES  Amoxicillin-pot clavulanate and Sulfa antibiotics      REVIEW OF SYSTEMS  Review of all 14 systems is negative other than stated in the HPI above.      PHYSICAL EXAM    I have reviewed the triage vital signs and nursing notes.    ED Triage Vitals   Temp Heart Rate Resp BP SpO2   05/08/24 2206 05/08/24 2206 05/08/24 2206 05/08/24 2210 05/08/24 2206   98.1 °F (36.7 °C) 69 18 (!) 185/106 98 %      Temp src Heart Rate Source Patient Position BP Location FiO2 (%)   05/08/24 2206 05/08/24 2206 05/08/24 2210 05/08/24 2210 --   Tympanic Monitor Lying Right arm          GENERAL: awake and alert, appears uncomfortable but no acute distress  HENT: Normocephalic, atraumatic  EYES: no scleral icterus, EOMI  CV: regular rhythm, regular rate, no murmur, no peripheral edema  RESPIRATORY: normal effort, lungs clear bilaterally, no wheezing  ABDOMEN: soft, nondistended, nontender throughout  MUSCULOSKELETAL: no deformity, no calf tenderness bilaterally.  There is no right lateral chest wall tenderness.  NEURO: alert, moves all extremities, follows commands  PSYCH: calm, cooperative  SKIN: Warm, dry, no rash          LAB RESULTS  Recent Results (from the past 24 hour(s))   ECG 12 Lead Chest Pain    Collection Time: 05/08/24 10:21 PM   Result Value Ref Range    QT Interval 444 ms    QTC Interval 417 ms   Comprehensive Metabolic Panel    Collection Time: 05/08/24 10:51 PM     Specimen: Blood   Result Value Ref Range    Glucose 109 (H) 65 - 99 mg/dL    BUN 27 (H) 8 - 23 mg/dL    Creatinine 0.91 0.57 - 1.00 mg/dL    Sodium 143 136 - 145 mmol/L    Potassium 3.7 3.5 - 5.2 mmol/L    Chloride 105 98 - 107 mmol/L    CO2 27.2 22.0 - 29.0 mmol/L    Calcium 9.8 8.6 - 10.5 mg/dL    Total Protein 7.1 6.0 - 8.5 g/dL    Albumin 4.3 3.5 - 5.2 g/dL    ALT (SGPT) 21 1 - 33 U/L    AST (SGOT) 20 1 - 32 U/L    Alkaline Phosphatase 70 39 - 117 U/L    Total Bilirubin 0.4 0.0 - 1.2 mg/dL    Globulin 2.8 gm/dL    A/G Ratio 1.5 g/dL    BUN/Creatinine Ratio 29.7 (H) 7.0 - 25.0    Anion Gap 10.8 5.0 - 15.0 mmol/L    eGFR 71.9 >60.0 mL/min/1.73   BNP    Collection Time: 05/08/24 10:51 PM    Specimen: Blood   Result Value Ref Range    proBNP 750.0 0.0 - 900.0 pg/mL   High Sensitivity Troponin T    Collection Time: 05/08/24 10:51 PM    Specimen: Blood   Result Value Ref Range    HS Troponin T 11 <14 ng/L   CBC Auto Differential    Collection Time: 05/08/24 10:51 PM    Specimen: Blood   Result Value Ref Range    WBC 9.99 3.40 - 10.80 10*3/mm3    RBC 4.23 3.77 - 5.28 10*6/mm3    Hemoglobin 12.6 12.0 - 15.9 g/dL    Hematocrit 38.3 34.0 - 46.6 %    MCV 90.5 79.0 - 97.0 fL    MCH 29.8 26.6 - 33.0 pg    MCHC 32.9 31.5 - 35.7 g/dL    RDW 13.4 12.3 - 15.4 %    RDW-SD 44.0 37.0 - 54.0 fl    MPV 10.5 6.0 - 12.0 fL    Platelets 201 140 - 450 10*3/mm3    Neutrophil % 59.2 42.7 - 76.0 %    Lymphocyte % 24.5 19.6 - 45.3 %    Monocyte % 7.5 5.0 - 12.0 %    Eosinophil % 7.7 (H) 0.3 - 6.2 %    Basophil % 0.8 0.0 - 1.5 %    Immature Grans % 0.3 0.0 - 0.5 %    Neutrophils, Absolute 5.91 1.70 - 7.00 10*3/mm3    Lymphocytes, Absolute 2.45 0.70 - 3.10 10*3/mm3    Monocytes, Absolute 0.75 0.10 - 0.90 10*3/mm3    Eosinophils, Absolute 0.77 (H) 0.00 - 0.40 10*3/mm3    Basophils, Absolute 0.08 0.00 - 0.20 10*3/mm3    Immature Grans, Absolute 0.03 0.00 - 0.05 10*3/mm3    nRBC 0.0 0.0 - 0.2 /100 WBC       The above labs were ordered by me  and independently reviewed by me.     RADIOLOGY  CT Angiogram Chest Pulmonary Embolism    Result Date: 5/9/2024  Patient: JACK WHITE  Time Out: 00:59 Exam(s): CTA CHEST EXAM:   CT Angiography Chest With Intravenous Contrast CLINICAL HISTORY:    Reason for exam: pleuritic chest pain, hx of PE. TECHNIQUE:   Axial computed tomographic angiography images of the chest with intravenous contrast.  CTDI is 2.83 mGy and DLP is 95.6 mGy-cm.  This CT exam was performed according to the principle of ALARA (As Low As Reasonably Achievable) by using one or more of the following dose reduction techniques: automated exposure control, adjustment of the mA and or kV according to patient size, and or use of iterative reconstruction technique.   MIP reconstructed images were created and reviewed. COMPARISON:   No relevant prior studies available. FINDINGS:   Pulmonary arteries:  Unremarkable.  No acute pulmonary embolism.   Aorta:  No acute findings.  No thoracic aortic aneurysm.   Lungs:  Unremarkable.  No mass.  No consolidation.   Pleural space:  Unremarkable.  No significant effusion.  No pneumothorax.   Heart:  Unremarkable.  No cardiomegaly.  No significant pericardial effusion.  No evidence of RV dysfunction.   Bones joints:  No acute fracture.  No dislocation.   Soft tissues:  Unremarkable.   Lymph nodes:  Unremarkable.  No enlarged lymph nodes.   Liver:  Hepatic steatosis.   Gallbladder and bile ducts:  Contracted gallbladder. IMPRESSION:       No acute pulmonary embolism.     Electronically signed by Deep Salazar MD on 05-09-24 at 0059     The above radiology studies were ordered by me.  See ED course for independent interpretations.     MEDICATIONS GIVEN IN ER  Medications   morphine injection 4 mg (4 mg Intravenous Given 5/8/24 2254)   ondansetron (ZOFRAN) injection 4 mg (4 mg Intravenous Given 5/8/24 2254)   HYDROcodone-acetaminophen (NORCO) 7.5-325 MG per tablet 1 tablet (1 tablet Oral Given 5/9/24 0023)   iopamidol  (ISOVUE-370) 76 % injection 100 mL (95 mL Intravenous Given 5/9/24 0014)         ORDERS PLACED DURING THIS VISIT:  Orders Placed This Encounter   Procedures    CT Angiogram Chest Pulmonary Embolism    Comprehensive Metabolic Panel    BNP    High Sensitivity Troponin T    CBC Auto Differential    Continuous Pulse Oximetry    Monitor Blood Pressure    ECG 12 Lead Chest Pain    CBC & Differential         OUTPATIENT MEDICATION MANAGEMENT:  No current Epic-ordered facility-administered medications on file.     Current Outpatient Medications Ordered in Epic   Medication Sig Dispense Refill    aspirin 81 MG chewable tablet Chew 1 tablet Daily. 90 tablet 0    atorvastatin (LIPITOR) 40 MG tablet Take 1 tablet by mouth Every Night. 30 tablet 0    azelastine (OPTIVAR) 0.05 % ophthalmic solution instill 1 drop into both eyes twice a day      Calcium Carbonate-Vit D-Min (CALCIUM 1200 PO) Take  by mouth.      Calcium Citrate-Vitamin D 500-10 MG-MCG chewable tablet Every 12 (Twelve) Hours.      carvedilol (COREG) 12.5 MG tablet Take 1 tablet by mouth 2 (Two) Times a Day. 180 tablet 3    cetirizine (zyrTEC) 10 MG tablet Take 1 tablet by mouth Daily.      Cholecalciferol (Vitamin D3) 10 MCG (400 UNIT) capsule Daily.      cloNIDine (Catapres) 0.1 MG tablet Take 1 tablet by mouth Daily As Needed for High Blood Pressure (for BP that is persistently elevated >200/100). 30 tablet 0    denosumab (Prolia) 60 MG/ML solution prefilled syringe syringe Inject 1 mL under the skin into the appropriate area as directed Every 6 (Six) Months.      diclofenac (VOLTAREN) 50 MG EC tablet Take 1 tablet by mouth Every 12 (Twelve) Hours.      dicyclomine (BENTYL) 20 MG tablet Take 1 tablet by mouth.      gabapentin (NEURONTIN) 100 MG capsule TAKE 1 CAPSULE IN THE MORNING AND 1 CAP MID-DAY AND 2 CAPS AT NIGHT 120 capsule 0    hydroCHLOROthiazide (HYDRODIURIL) 12.5 MG tablet Take 1 tablet by mouth Daily. Pt takes 1/2 tab qam      HYDROcodone-acetaminophen  (NORCO) 7.5-325 MG per tablet Take 1 tablet by mouth Every 6 (Six) Hours As Needed for Moderate Pain. 10 tablet 0    metoclopramide (REGLAN) 5 MG tablet Take 1 tablet by mouth 3 (Three) Times a Day As Needed (abd pain). 21 tablet 0    omeprazole (priLOSEC) 20 MG capsule TAKE 1 TABLET DAILY 1 HR BEFORE EVENING MEAL.      valsartan (DIOVAN) 160 MG tablet Take 0.5 tablets by mouth every night at bedtime. 180 tablet 1         PROCEDURES  Procedures            PROGRESS, DATA ANALYSIS, CONSULTS, AND MEDICAL DECISION MAKING  All labs have been independently interpreted by me.  All radiology studies have been reviewed by me. All EKG's have been independently viewed and interpreted by me.  Discussion below represents my analysis of pertinent findings related to patient's condition, differential diagnosis, treatment plan and final disposition.    Differential diagnosis includes but is not limited to:  Pleurisy  Pulmonary embolism  Pneumothorax  Pneumonia  Pleural effusion      Clinical Scores:                  ED Course as of 05/09/24 0340   Wed May 08, 2024   2218 Patient has a history of pulmonary embolism after her hysterectomy in the past.  She is not currently on anticoagulation.  She has no stigmata of DVT on exam and no recent prolonged immobilization or surgery.  I discussed plan to obtain a CTA chest to rule out pulmonary embolism. [JR]   2311 EKG          EKG time: 2221  Rhythm/Rate: Sinus bradycardia, 53  P waves and ID: Normal  QRS, axis: LBBB  ST and T waves: Borderline ST depression V3 through V6    Interpreted Contemporaneously by me, independently viewed  Similar compared to prior 9/18/2023       [JR]   Thu May 09, 2024   0016 CTA chest independently interpreted in PACS.  There is no evidence of central pulmonary embolism.  There appears to be some mild groundglass opacity at the lung bases bilaterally. [JR]   0103 CTA chest is negative for pulmonary embolism.  EKG appears reassuring and cardiac troponin is  within normal limits.  Patient has had pain for greater than 12 hours therefore I do not think that repeat cardiac troponin is warranted, especially given atypical symptoms.  She is appropriate for discharge home and will be provided with a short course of Norco as needed for pain.  Ervin reviewed. [JR]      ED Course User Index  [JR] Sumeet Damian MD             AS OF 03:40 EDT VITALS:    BP - 136/76  HR - (!) 46  TEMP - 98.1 °F (36.7 °C) (Tympanic)  O2 SATS - 90%    COMPLEXITY OF CARE  Admission was considered but after careful review of the patient's presentation, physical examination, diagnostic results, and response to treatment the patient may be safely discharged with outpatient follow-up.      Chronic or social conditions impacting patient care (Social Determinants of Health):     DIAGNOSIS  Final diagnoses:   Pleurisy           DISPOSITION  DISCHARGE    Patient discharged in stable condition.    Reviewed implications of results, diagnosis, meds, responsibility to follow up, warning signs and symptoms of possible worsening, potential complications and reasons to return to ER.    Patient/Family voiced understanding of above instructions.    Discussed plan for discharge, as there is no emergent indication for admission. Patient referred to primary care provider for BP management due to today's BP. Pt/family is agreeable and understands need for follow up and repeat testing.  Pt is aware that discharge does not mean that nothing is wrong but it indicates no emergency is present that requires admission and they must continue care with follow-up as given below or physician of their choice.     FOLLOW-UP  Clem Bush MD  [address]  [facility] [address]  [phone]    Schedule an appointment as soon as possible for a visit            Medication List        New Prescriptions      HYDROcodone-acetaminophen 7.5-325 MG per tablet  Commonly known as: NORCO  Take 1 tablet by mouth  Every 6 (Six) Hours As Needed for Moderate Pain.               Where to Get Your Medications        These medications were sent to [facility]/pharmacy #[id number] - [place], [place] - [address] AT [facility] - [phone]  - [phone] FX  [address] [place] [id number]      Phone: [phone]   HYDROcodone-acetaminophen 7.5-325 MG per tablet             Prescription drug monitoring program review: SAGAR reviewed by Sumeet Damian MD         Please note that portions of this document were completed with a voice recognition program.    Note Disclaimer: At [facility], we believe that sharing information builds trust and better relationships. You are receiving this note because you recently visited [facility]. It is possible you will see health information before a provider has talked with you about it. This kind of information can be easy to misunderstand. To help you fully understand what it means for your health, we urge you to discuss this note with your provider.         Sumeet Damian MD  05/09/24 3207

## 2024-05-10 LAB
QT INTERVAL: 444 MS
QTC INTERVAL: 417 MS

## 2024-05-10 RX ORDER — GABAPENTIN 100 MG/1
CAPSULE ORAL
Qty: 120 CAPSULE | Refills: 5 | Status: SHIPPED | OUTPATIENT
Start: 2024-05-10

## 2024-06-18 DIAGNOSIS — M54.50 LOW BACK PAIN WITHOUT SCIATICA, UNSPECIFIED BACK PAIN LATERALITY, UNSPECIFIED CHRONICITY: ICD-10-CM

## 2024-06-18 RX ORDER — HYDROCHLOROTHIAZIDE 12.5 MG/1
12.5 TABLET ORAL DAILY
Qty: 90 TABLET | Refills: 1 | Status: SHIPPED | OUTPATIENT
Start: 2024-06-18 | End: 2024-06-20 | Stop reason: SDUPTHER

## 2024-06-20 RX ORDER — HYDROCHLOROTHIAZIDE 25 MG/1
25 TABLET ORAL DAILY
Qty: 90 TABLET | Refills: 3 | Status: SHIPPED | OUTPATIENT
Start: 2024-06-20

## 2024-09-27 RX ORDER — CARVEDILOL 25 MG/1
25 TABLET ORAL 2 TIMES DAILY
Qty: 180 TABLET | Refills: 3 | Status: SHIPPED | OUTPATIENT
Start: 2024-09-27

## 2024-10-07 NOTE — ED NOTES
Pt dc [id number] patent airway & steady gait out of er with spouse. Pt verbalized understanding of dc teaching.    endoscopy, colonoscopy

## 2024-10-14 ENCOUNTER — TELEPHONE (OUTPATIENT)
Dept: CARDIOLOGY | Facility: CLINIC | Age: 59
End: 2024-10-14
Payer: COMMERCIAL

## 2024-10-14 NOTE — TELEPHONE ENCOUNTER
Chart review shows appointment has already been made:        No further action needed at this time.    Thank you,  Coleen CONROY RN  Triage Nurse ELIOT  10/14/24 10:56 EDT    Subjective:   Tia Stephens is a 66 y.o. female      Chief Complaint   Patient presents with    Neck Pain     Radiating to the back of her head and into her right arm        History of present illness: She presents complaints of neck pain on the right side spine radiating up the back of her head and into her right arm. She can hear some cracking and popping when she twists her neck. She has had no history of falls or trauma. She notes no numbness or paresthesias. She also would like her blood pressure checked as it has been running a little high. She is taking then Norvasc 5 mg daily and hydrochlorothiazide 25 mg daily. She notes no headaches other than those associated with the neck pain. She denies any chest pain, shortness of breath or cardiorespiratory complaints. There are no GI or  complaints. Remainder complete review of systems is negative.     Patient Active Problem List   Diagnosis Code    SBO (small bowel obstruction) ([facility] K56.609    Crohn disease ([facility] K50.90    Essential hypertension I10    Controlled type 2 diabetes mellitus with stage 2 chronic kidney disease, without long-term current use of insulin ([facility]) E11.22, N18.2    Mixed hyperlipidemia E78.2    Primary osteoarthritis involving multiple joints M15.9    PAF (paroxysmal atrial fibrillation) ([facility]) I48.0    Post-menopausal Z78.0    Acquired hypothyroidism E03.9    Stage 2 chronic kidney disease N18.2    Medicare annual wellness visit, subsequent Z00.00    Colon cancer screening Z12.11    Acute bronchitis J20.9    Acute non-recurrent maxillary sinusitis J01.00    Alcohol screening Z13.39    Edema R60.9    Neck pain M54.2    Hammer toe of second toe of left foot M20.42    Vitamin D deficiency E55.9    Presbyopia H52.4      Past Medical History:   Diagnosis Date    Acquired hypothyroidism 7/26/2017    CKD (chronic kidney disease) 7/26/2017    Crohn's disease ([facility]     Diabetes ([facility]     Hypertension     On statin therapy 7/26/2017 Pleurisy 2019    Post-menopausal 2017      Allergies   Allergen Reactions    Lisinopril Cough      Family History   Problem Relation Age of Onset    Diabetes Mother     Heart Failure Mother     Cancer Father         stomach      Social History     Socioeconomic History    Marital status:      Spouse name: Not on file    Number of children: Not on file    Years of education: Not on file    Highest education level: Not on file   Occupational History    Not on file   Tobacco Use    Smoking status: Former     Packs/day: 1.00     Years: 25.00     Pack years: 25.00     Types: Cigarettes     Quit date: 5     Years since quittin.2    Smokeless tobacco: Never   Vaping Use    Vaping Use: Never used   Substance and Sexual Activity    Alcohol use: Yes     Comment: 2 drinks per month     Drug use: No    Sexual activity: Never   Other Topics Concern    Not on file   Social History Narrative    Not on file     Social Determinants of Health     Financial Resource Strain: Low Risk     Difficulty of Paying Living Expenses: Not hard at all   Food Insecurity: No Food Insecurity    Worried About Running Out of Food in the Last Year: Never true    Ran Out of Food in the Last Year: Never true   Transportation Needs: Not on file   Physical Activity: Not on file   Stress: Not on file   Social Connections: Not on file   Intimate Partner Violence: Not on file   Housing Stability: Not on file     Prior to Admission medications    Medication Sig Start Date End Date Taking? Authorizing Provider   valsartan (DIOVAN) 160 mg tablet Take 1 Tablet by mouth daily. 3/13/23  Yes Shirley Rincon MD   predniSONE (STERAPRED) 5 mg dose pack See administration instruction per 5mg dose pack 3/13/23  Yes Shirley Rincon MD   amLODIPine (NORVASC) 5 mg tablet Take 1 Tablet by mouth daily for 90 days.  3/1/23 5/30/23 Yes Shirley Rincon MD   hydroCHLOROthiazide (HYDRODIURIL) 25 mg tablet TAKE 1 TABLET BY MOUTH EVERY DAY 10/18/22  Yes Amaris Morton MD   nateglinide (STARLIX) 120 mg tablet TAKE 1 TABLET BY MOUTH THREE TIMES DAILY BEFORE MEALS 8/22/22  Yes Amaris Morton MD   metFORMIN ER (GLUCOPHAGE XR) 500 mg tablet TAKE 1 TABLET BY MOUTH TWICE A DAY WITH MEALS 8/8/22  Yes Amaris Morton MD   diclofenac EC (VOLTAREN) 75 mg EC tablet TAKE 1 TABLET TWICE A DAY WITH FOOD FOR PAIN AND INFLAMMATION BREAKFAST AND SUPPER 7/5/22  Yes Amaris Morton MD   levothyroxine (SYNTHROID) 50 mcg tablet TAKE 1 TABLET EVERY DAY 5/10/22  Yes Amaris Morton MD   fish oil-dha-epa 3,411-172-480 mg cap Take 1 Tablet by mouth daily. Yes Provider, Historical   GUAIFENESIN/PSEUDOEPHEDRNE HCL (MUCINEX D PO) Take  by mouth as needed. Yes Provider, Historical   MULTIVIT,TH IRON,OTHER MIN (COMPLETE MULTIVITAMIN PO) Take 1 Tab by mouth daily. Indications: Complete multivitamin Women's 50+   Yes Other, MD Cordelia   calcium-cholecalciferol, D3, (CALTRATE 600+D) tablet Take 1 Tab by mouth daily. Yes Other, MD Cordelia        Review of Systems              Constitutional:  She denies fever, weight loss, sweats or fatigue. EYES: No blurred or double vision,               ENT: no nasal congestion, no headache or dizziness. No difficulty with               swallowing, taste, speech or smell. Neck: Pain as noted above  Respiratory:  No cough, wheezing or shortness of breath. No sputum production. Cardiac:  Denies chest pain, palpitations, unexplained indigestion, syncope, edema, PND or orthopnea. GI:  No changes in bowel movements, no abdominal pain, no bloating, anorexia, nausea, vomiting or heartburn. :  No frequency or dysuria. Denies incontinence or sexual dysfunction. Extremities:  No joint pain, stiffness or swelling  Back:.no pain or soreness  Skin:  No recent rashes or mole changes. Neurological:  No numbness, tingling, burning paresthesias or loss of motor strength.   No syncope, dizziness, frequent headaches or memory loss. Hematologic:  No easy bruising  Lymphatic: No lymph node enlargement    Objective:     Vitals:    03/13/23 1457 03/13/23 1528 03/13/23 1538   BP: (!) 162/92 (!) 154/98 (!) 148/92   Pulse: 92     Temp: 98.7 °F (37.1 °C)     SpO2: 98%     Weight: 190 lb 14.4 oz (86.6 kg)     Height: 5' 7\" (1.702 m)     PainSc:   8     PainLoc: Neck         Body mass index is 29.9 kg/m². Physical Examination:              General Appearance:  Well-developed, well-nourished, no acute distress. HEENT:      Ears:  The TMs and ear canals were clear. Eyes:  The pupillary responses were normal.  Extraocular muscle function intact. Lids and conjunctiva not injected. Funduscopic exam revealed sharp disc margins. Nares: Clear w/o edema or erythema  Pharynx:  Clear with teeth in good repair. No masses were noted. Neck:  Supple without thyromegaly or adenopathy. No JVD noted. No carotid bruits. Tenderness is noted to palpation right posterior neck area that tends to bother her when she turns her head   Lungs:  Clear to auscultation and percussion. Cardiac:  Regular rate and rhythm without murmur. PMI not displaced. No gallop, rub or click. Abdominal: Soft, non-tender, no hepata-spleenomegally or masses  Extremities:  No clubbing, cyanosis or edema. Skin:  No rash or unusual mole changes noted. Lymph Nodes:  None felt in the cervical, supraclavicular, axillary or inguinal region. Neurological: . DTRs 2+ and symmetric. Station and gait normal.   Hematologic:   No purpura or petechiae        Assessment/Plan:         1. Neck pain    2. Essential hypertension        Impressions/Plan:  Impression  1. Neck pain secondary to severe DJD. X-ray cervical spine reveals moderate to severe DJD. At this point I will put her on a steroid Dosepak. 2.  Hypertension not controlled by myself nor to nurses so I will add Diovan 160 daily. Recheck per previous schedule.     Orders Placed This Encounter    XR SPINE CERV PA LAT ODONT 3 V MAX    valsartan (DIOVAN) 160 mg tablet    predniSONE (STERAPRED) 5 mg dose pack       Follow-up and Dispositions    Return in about 4 weeks (around 4/10/2023). No results found for any visits on 03/13/23. Glo Saldana MD    The patient was given after the visit summary the patient verbalized an understanding of the plans and problems as explained.

## 2024-10-14 NOTE — TELEPHONE ENCOUNTER
Caller: Emy Medina    Relationship to patient: Self    Best call back number: [phone]    Chief complaint: HIGH BP    Type of visit: FU    Requested date: ASAP- EVENING TIME     Additional notes: PT IS HAVING HIGH BP- 173/102 THIS MORNING, 123/81 LAST NIGHT. THIS HAS BEEN ONGOING FOR 2 YEARS. PT STATED NO OTHER SYMPTOMS HAPPENING. REQUESTING AN APPT IN THE EVENING.

## 2024-10-18 ENCOUNTER — OFFICE VISIT (OUTPATIENT)
Age: 59
End: 2024-10-18
Payer: COMMERCIAL

## 2024-10-18 VITALS
DIASTOLIC BLOOD PRESSURE: 70 MMHG | HEART RATE: 59 BPM | BODY MASS INDEX: 24.78 KG/M2 | HEIGHT: 66 IN | SYSTOLIC BLOOD PRESSURE: 112 MMHG | WEIGHT: 154.2 LBS

## 2024-10-18 DIAGNOSIS — I10 PRIMARY HYPERTENSION: Primary | ICD-10-CM

## 2024-10-18 PROCEDURE — 99213 OFFICE O/P EST LOW 20 MIN: CPT | Performed by: INTERNAL MEDICINE

## 2024-10-18 RX ORDER — CYCLOBENZAPRINE HCL 5 MG
5 TABLET ORAL NIGHTLY
COMMUNITY
Start: 2024-10-10

## 2024-10-21 NOTE — PROGRESS NOTES
Subjective:     Encounter Date: 10/21/24      Patient ID: Emy Medina is a 59 y.o. female.    Chief Complaint: Hypertension  HPI:   This is a 58-year-old woman with hypertension, PE, CVA.  When I met her in April 2022 she had recently been in the emergency with chest pain.  At that time she was severely hypertensive.  We had been working on her blood pressure and had actually pretty well controlled. Then in August 2022 she noted some unilateral weakness in her arm and leg.  She waited a few days but eventually presented to the emergency room.  At that time she was found to have a lacunar stroke.      She returns today.  She had spine surgery in August.  Up to then her blood pressure was well-controlled.  Since then she has noticed her blood pressures are fluctuating again.  She had been in rehab and was getting around-the-clock medications as prescribed and her pressures were well-controlled in the low 100s and at times high 90s systolic without dizziness or weakness or fatigue.  However, since then she has been holding her blood pressure medications for systolics less than 120.  She has had some normal systolics in the 1 teens and 120s however she has had some systolics as high as 170.    She works in the cafeteria of a school.  She is .  She has never smoked.  She does not drink alcohol.  Her history of PE was back in 2016 which occurred after hysterectomy.  She was treated with anticoagulation for 6 months.  The following portions of the patient's history were reviewed and updated as appropriate: allergies, current medications, past family history, past medical history, past social history, past surgical history and problem list.     REVIEW OF SYSTEMS:   All systems reviewed.  Pertinent positives identified in HPI.  All other systems are negative.    Past Medical History:   Diagnosis Date    Arthritis     Hypertension     PE (pulmonary embolism)     Pleurisy        Family History   Problem Relation  Age of Onset    Stroke Mother     Hypertension Mother     Multiple sclerosis Father        Social History     Socioeconomic History    Marital status:    Tobacco Use    Smoking status: Never    Smokeless tobacco: Never   Vaping Use    Vaping status: Never Used   Substance and Sexual Activity    Alcohol use: No    Drug use: No    Sexual activity: Yes     Partners: Male       Allergies   Allergen Reactions    Amoxicillin-Pot Clavulanate Diarrhea     Stomach cramping     Sulfa Antibiotics Hives       Past Surgical History:   Procedure Laterality Date    CATARACT EXTRACTION      HYSTERECTOMY      LASIK      SINUS SURGERY         Procedures       Objective:         PHYSICAL EXAM:  GEN: VSS, no distress,   Eyes: normal sclera, normal lids and lashes  HENT: moist mucus membranes,   Respiratory: CTAB, no rales or wheezes  CV: RRR, no murmurs, , +2 DP and 2+ carotid pulses b/l  GI: NABS, soft,  Nontender, nondistended  MSK: no edema, no scoliosis or kyphosis  Skin: no rash, warm, dry  Heme/Lymph: no bruising or bleeding  Psych: organized thought, normal behavior and affect  Neuro: Cranial nerves grossly intact, Alert and Oriented x 3.         Assessment:          Diagnosis Plan   1. Primary hypertension                 Plan:       1.  Hypertension: Continue her current therapy however I have instructed her not to hold her blood pressure medicine unless her systolic is less than the 100 (as opposed to her current practice of 120).  When in rehab her pressures were very well-controlled if not on the low side though asymptomatic when she was receiving her medication around-the-clock.  If this is not effective will plan on making valsartan BID.    2.  Atrial fibrillation: Provoked by acute pharyngitis/volume depletion.  2-week monitor in April 2023 showed nocturnal junctional rhythm, brief VT and multiple episodes of short SVT.  Recurrence of A-fib.  Not anticoagulated.      3.  History of PE 2016 postoperative  hysterectomy  4.  CVA August 2022: Aspirin, Lipitor 40, blood pressure control    Dr. Bush, thank you very much for referring this kind patient to me. Please call me with any questions or concerns. I will see the patient again in the office in3-6 months.          Leighann Echols MD  10/21/24  [facility]    Outpatient Encounter Medications as of 10/18/2024   Medication Sig Dispense Refill    aspirin 81 MG chewable tablet Chew 1 tablet Daily. 90 tablet 0    atorvastatin (LIPITOR) 40 MG tablet Take 1 tablet by mouth Every Night. 30 tablet 0    Calcium Carbonate-Vit D-Min (CALCIUM 1200 PO) Take  by mouth.      Calcium Citrate-Vitamin D 500-10 MG-MCG chewable tablet Every 12 (Twelve) Hours.      carvedilol (COREG) 25 MG tablet Take 1 tablet by mouth 2 (Two) Times a Day. 180 tablet 3    cetirizine (zyrTEC) 10 MG tablet Take 1 tablet by mouth As Needed.      Cholecalciferol (Vitamin D3) 10 MCG (400 UNIT) capsule Daily.      cloNIDine (Catapres) 0.1 MG tablet Take 1 tablet by mouth Daily As Needed for High Blood Pressure (for BP that is persistently elevated >200/100). 30 tablet 0    cyclobenzaprine (FLEXERIL) 5 MG tablet Take 1 tablet by mouth Every Night.      denosumab (Prolia) 60 MG/ML solution prefilled syringe syringe Inject 1 mL under the skin into the appropriate area as directed Every 6 (Six) Months.      diclofenac (VOLTAREN) 50 MG EC tablet Take 1 tablet by mouth Every 12 (Twelve) Hours.      dicyclomine (BENTYL) 20 MG tablet Take 1 tablet by mouth As Needed.      gabapentin (NEURONTIN) 100 MG capsule TAKE 1 CAPSULE BY MOUTH IN THE MORNING AND 1 CAPSULE MID-DAY AND 2 CAPSULES AT NIGHT (Patient taking differently: Take 3 capsules by mouth Daily.) 120 capsule 5    hydroCHLOROthiazide 25 MG tablet Take 1 tablet by mouth Daily. 90 tablet 3    metoclopramide (REGLAN) 5 MG tablet Take 1 tablet by mouth 3 (Three) Times a Day As Needed (abd pain). 21 tablet 0    omeprazole (priLOSEC) 20 MG capsule  Take 2 capsules by mouth As Needed.      valsartan (DIOVAN) 160 MG tablet Take 0.5 tablets by mouth every night at bedtime. 180 tablet 1    azelastine (OPTIVAR) 0.05 % ophthalmic solution instill 1 drop into both eyes twice a day (Patient not taking: Reported on 10/18/2024)      HYDROcodone-acetaminophen (NORCO) 7.5-325 MG per tablet Take 1 tablet by mouth Every 6 (Six) Hours As Needed for Moderate Pain. 10 tablet 0     No facility-administered encounter medications on file as of 10/18/2024.

## 2024-10-30 ENCOUNTER — TELEPHONE (OUTPATIENT)
Dept: CARDIOLOGY | Facility: CLINIC | Age: 59
End: 2024-10-30
Payer: COMMERCIAL

## 2024-10-30 NOTE — TELEPHONE ENCOUNTER
It sounds like this is not happening on a normal basis; therefore no medication changes at this time.  Agree with staing hydrated, mayank if coming down with something like sinus infection, etc.

## 2024-10-30 NOTE — TELEPHONE ENCOUNTER
pt-LOV 10/18/24- per this note she has been having fluctuating BPs    Patient is calling because she had a drop in her BP this morning.  This morning her BP was 147/89.  She took her morning medications around 0800.  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  She is currently drinking some water and eating some saltines.  I advised if they have any gatorade or something with more sodium that could help raise the BP.      Yesterday her BP was 184 before her medications and then dropped to 106/65.  Usually her BP BEFORE her meds has been running 130s-140s.    She denies any diarrhea/vomiting or issues with fluid loss that could be contributing.  She has a good appetite, but admits she might not drink enough water..  She feels like she might be coming down with a sinus infection.  I let her know she needs to increase her daily water intake.    She has had one syncopal episode in the past in March.     For now I advised that she call the school nurse to come recheck her BP after eating something salty and drinking some water.  I advised her not to get up and walk around if she is lightheaded and weak.  She said the school nurse will come to her. I will call her back within an hour and see if her BP is improved.  If she starts feeling worse she will go to ER.    Her medications are:  coreg 25 mg BID  HCTZ 25 mg in AM  Valsartan 80 mg at night  (She has clonidine 0.1 mg PRN but she has not had to take any in a while)    Do you have recommendations?  Does she need med adjustment?    Addendum: 1104 am I called to check on Emy.  She is feeling a lot better BP has improved to 104/60.  Symptoms have resolved.  She stayed at work.  I told her I will call her back once I hear from  APRN.    Thanks!    Tanisha Liu Cardiology Triage  10/30/24 10:08 EDT

## 2024-10-30 NOTE — TELEPHONE ENCOUNTER
I called and spoke with her and she verbalizes understanding.     Tanisha Liu Cardiology Triage  10/30/24 14:06 EDT

## 2025-05-08 RX ORDER — HYDROCHLOROTHIAZIDE 25 MG/1
25 TABLET ORAL DAILY
Qty: 90 TABLET | Refills: 1 | Status: SHIPPED | OUTPATIENT
Start: 2025-05-08

## 2025-05-09 ENCOUNTER — OFFICE VISIT (OUTPATIENT)
Dept: CARDIOLOGY | Age: 60
End: 2025-05-09
Payer: COMMERCIAL

## 2025-05-09 VITALS
HEIGHT: 66 IN | WEIGHT: 160 LBS | OXYGEN SATURATION: 97 % | HEART RATE: 46 BPM | BODY MASS INDEX: 25.71 KG/M2 | DIASTOLIC BLOOD PRESSURE: 80 MMHG | SYSTOLIC BLOOD PRESSURE: 122 MMHG

## 2025-05-09 DIAGNOSIS — I10 PRIMARY HYPERTENSION: Primary | ICD-10-CM

## 2025-05-09 PROCEDURE — 93000 ELECTROCARDIOGRAM COMPLETE: CPT | Performed by: NURSE PRACTITIONER

## 2025-05-09 PROCEDURE — 99214 OFFICE O/P EST MOD 30 MIN: CPT | Performed by: NURSE PRACTITIONER

## 2025-05-09 RX ORDER — CARVEDILOL 12.5 MG/1
12.5 TABLET ORAL 2 TIMES DAILY
Qty: 60 TABLET | Refills: 11 | Status: SHIPPED | OUTPATIENT
Start: 2025-05-09

## 2025-05-09 RX ORDER — VALSARTAN 160 MG/1
80 TABLET ORAL 2 TIMES DAILY
Qty: 180 TABLET | Refills: 3 | OUTPATIENT
Start: 2025-05-09

## 2025-05-09 NOTE — PROGRESS NOTES
Date of Office Visit: 2025  Encounter Provider: ELA Durand  Place of Service: [facility] CARDIOLOGY  Patient Name: Emy Medina  :1965    Chief complaint: Atrial fibrillation    HPI: Emy Medina is a 60 y.o. female who is a patient of Dr. Echols and is new to me today.  She has a history of hypertension, pulmonary embolism, stroke.  We met her in 2022 she had been to the ER with chest pain she was severely hypertensive at that time.  In August of that year she came in with unilateral weakness in her arm and leg she had waited for a few days but then was found to have a lack Kuehner infarct on imaging.  She had spine surgery in 2024 her blood pressures been well-controlled there were times it was fluctuating after her surgery and ended up going to rehab postsurgery.  She does not smoke she does not drink alcohol.  Her pulmonary embolism in the past happened after she had a hysterectomy and she was treated for 6 months with anticoagulant.    She comes in today for follow-up.  She denies any chest pain, pressure or tightness.  She still having some fluctuations in blood pressure.  Majority of her blood pressure readings over the last 2 weeks have been high in the 140s to 150s systolic.  Bottom number sometimes gets down into the 50s.  In the morning after she takes her carvedilol.  She has been complaining of dizziness about 2 hours later.    Previous testing and notes have been reviewed by me.    Latest Reference Range & Units Most Recent   Sodium 136 - 145 mmol/L 143  24 22:51   Potassium 3.5 - 5.2 mmol/L 3.7  24 22:51   Chloride 98 - 107 mmol/L 105  24 22:51   CO2 22.0 - 29.0 mmol/L 27.2  24 22:51   CO2 22 - 29 mmol/L 20 (L) (E)  3/16/23 03:53   Anion Gap 5.0 - 15.0 mmol/L 10.8  24 22:51   BUN 8 - 23 mg/dL 27 (H)  24 22:51   Creatinine 0.57 - 1.00 mg/dL 0.91  24 22:51   BUN/Creatinine Ratio 7.0 - 25.0  29.7  (H)  5/8/24 22:51   eGFR >60.0 mL/min/1.73 71.9  5/8/24 22:51   Est GFR by Clearance >60 /1.73 m2 >60 (E)  3/16/23 03:53   Glucose 65 - 99 mg/dL 109 (H)  5/8/24 22:51   Calcium 8.6 - 10.2 mg/dL 9.5 (E)  6/21/24 13:18   Magnesium 1.9 - 2.7 mg/dL 1.8 (L) (E)  12/27/24 15:17   Phosphorus 2.5 - 5.0 mg/dL 4.2 (E)  12/27/24 15:17   Alkaline Phosphatase 39 - 117 U/L 70  5/8/24 22:51   Total Protein 6.0 - 8.5 g/dL 7.1  5/8/24 22:51   Albumin 3.5 - 5.2 g/dL 4.3  5/8/24 22:51   Globulin gm/dL 2.8  5/8/24 22:51   A/G Ratio g/dL 1.5  5/8/24 22:51   AST (SGOT) 1 - 32 U/L 20  5/8/24 22:51   ALT (SGPT) 1 - 33 U/L 21  5/8/24 22:51   Total Bilirubin 0.0 - 1.2 mg/dL 0.4  5/8/24 22:51   (L): Data is abnormally low  (H): Data is abnormally high  (E): External lab result    March 2023 echocardiogram    Left ventricular systolic function is normal. Calculated left ventricular EF = 69.5%    Left ventricular wall thickness is consistent with hypertrophy. Sigmoid-shaped ventricular septum is present.    Left ventricular diastolic function was normal.    Stress test December 9, 2022    No new ST changes were noted above baseline. Of note, the patient only reached 75% of her age predicted maximum heart rate. She had to stop due to fatigue and dyspnea. She did not report chest pain.     Past Medical History:   Diagnosis Date    Arthritis     Hypertension     PE (pulmonary embolism)     Pleurisy        Past Surgical History:   Procedure Laterality Date    BACK SURGERY  08/16/2024    CATARACT EXTRACTION      HYSTERECTOMY      LASIK      SINUS SURGERY         Social History     Socioeconomic History    Marital status:    Tobacco Use    Smoking status: Never    Smokeless tobacco: Never   Vaping Use    Vaping status: Never Used   Substance and Sexual Activity    Alcohol use: No    Drug use: No    Sexual activity: Yes     Partners: Male       Family History   Problem Relation Age of Onset    Stroke Mother     Hypertension Mother     Multiple  sclerosis Father        Review of Systems   Constitutional: Negative for diaphoresis and malaise/fatigue.   Cardiovascular:  Negative for chest pain, claudication, dyspnea on exertion, irregular heartbeat, leg swelling, near-syncope, orthopnea, palpitations, paroxysmal nocturnal dyspnea and syncope.   Respiratory:  Negative for cough, shortness of breath and sleep disturbances due to breathing.    Musculoskeletal:  Negative for falls.   Neurological:  Negative for dizziness and weakness.   Psychiatric/Behavioral:  Negative for altered mental status and substance abuse.        Allergies   Allergen Reactions    Amoxicillin-Pot Clavulanate Diarrhea     Stomach cramping     Sulfa Antibiotics Hives         Current Outpatient Medications:     aspirin 81 MG chewable tablet, Chew 1 tablet Daily., Disp: 90 tablet, Rfl: 0    atorvastatin (LIPITOR) 40 MG tablet, Take 1 tablet by mouth Every Night., Disp: 30 tablet, Rfl: 0    azelastine (OPTIVAR) 0.05 % ophthalmic solution, , Disp: , Rfl:     Calcium Carbonate-Vit D-Min (CALCIUM 1200 PO), Take  by mouth., Disp: , Rfl:     Calcium Citrate-Vitamin D 500-10 MG-MCG chewable tablet, Every 12 (Twelve) Hours., Disp: , Rfl:     carvedilol (COREG) 25 MG tablet, Take 1 tablet by mouth 2 (Two) Times a Day., Disp: 180 tablet, Rfl: 3    cetirizine (zyrTEC) 10 MG tablet, Take 1 tablet by mouth As Needed., Disp: , Rfl:     Cholecalciferol (Vitamin D3) 10 MCG (400 UNIT) capsule, Daily., Disp: , Rfl:     cloNIDine (Catapres) 0.1 MG tablet, Take 1 tablet by mouth Daily As Needed for High Blood Pressure (for BP that is persistently elevated >200/100)., Disp: 30 tablet, Rfl: 0    denosumab (Prolia) 60 MG/ML solution prefilled syringe syringe, Inject 1 mL under the skin into the appropriate area as directed Every 6 (Six) Months., Disp: , Rfl:     diclofenac (VOLTAREN) 50 MG EC tablet, Take 1 tablet by mouth Every 12 (Twelve) Hours., Disp: , Rfl:     dicyclomine (BENTYL) 20 MG tablet, Take 1 tablet  "by mouth As Needed., Disp: , Rfl:     hydroCHLOROthiazide 25 MG tablet, TAKE 1 TABLET BY MOUTH EVERY DAY, Disp: 90 tablet, Rfl: 1    HYDROcodone-acetaminophen (NORCO) 7.5-325 MG per tablet, Take 1 tablet by mouth Every 6 (Six) Hours As Needed for Moderate Pain., Disp: 10 tablet, Rfl: 0    omeprazole (priLOSEC) 20 MG capsule, Take 2 capsules by mouth As Needed., Disp: , Rfl:     valsartan (DIOVAN) 160 MG tablet, Take 0.5 tablets by mouth every night at bedtime., Disp: 180 tablet, Rfl: 1    cyclobenzaprine (FLEXERIL) 5 MG tablet, Take 1 tablet by mouth Every Night., Disp: , Rfl:     gabapentin (NEURONTIN) 100 MG capsule, TAKE 1 CAPSULE BY MOUTH IN THE MORNING AND 1 CAPSULE MID-DAY AND 2 CAPSULES AT NIGHT (Patient taking differently: Take 3 capsules by mouth Daily.), Disp: 120 capsule, Rfl: 5    metoclopramide (REGLAN) 5 MG tablet, Take 1 tablet by mouth 3 (Three) Times a Day As Needed (abd pain)., Disp: 21 tablet, Rfl: 0        Objective:     Vitals:    05/09/25 1414   BP: 122/80   Pulse: (!) 46   SpO2: 97%   Weight: 72.6 kg (160 lb)   Height: 167.6 cm (66\")     Body mass index is 25.82 kg/m².    PHYSICAL EXAM:    Constitutional:       General: Not in acute distress.     Appearance: Normal appearance. Well-developed.   Eyes:      Pupils: Pupils are equal, round, and reactive to light.   HENT:      Head: Normocephalic.   Neck:      Vascular: No carotid bruit or JVD.   Pulmonary:      Effort: Pulmonary effort is normal. No tachypnea.      Breath sounds: Normal breath sounds. No wheezing. No rales.   Cardiovascular:      Normal rate. Regular rhythm.      No gallop.    Pulses:     Intact distal pulses.   Edema:     Peripheral edema absent.   Abdominal:      General: Bowel sounds are normal.      Palpations: Abdomen is soft.      Tenderness: There is no abdominal tenderness.   Musculoskeletal: Normal range of motion.      Cervical back: Normal range of motion and neck supple. No edema. Skin:     General: Skin is warm and " dry.   Neurological:      Mental Status: Alert and oriented to person, place, and time.         ECG 12 Lead    Date/Time: 5/9/2025 2:36 PM  Performed by: Tg Arce APRN    Authorized by: Tg Arce APRN  Comparison: compared with previous ECG from 5/8/2024  Similar to previous ECG  Rhythm: sinus rhythm  Rate: normal  T inversion: III and aVF  QRS axis: normal    Clinical impression: non-specific ECG            Assessment/Plan:      1.  Essential hypertension-will reduce carvedilol to 12.5 mg twice a day as she seems to be really dropping after the 25 mg.  Will make valsartan 80 mg twice a day and see if this helps better control as her blood pressures do run high.  Her heart rate is also in the 40s so lowering the carvedilol might help    2.  Dyslipidemia continue atorvastatin 40 mg daily    3.  History of stroke continue aspirin 81 mg daily and atorvastatin 40 mg daily    Follow-up in 3 months sooner if needed         Your medication list            Accurate as of May 9, 2025  2:34 PM. If you have any questions, ask your nurse or doctor.                CHANGE how you take these medications        Instructions Last Dose Given Next Dose Due   gabapentin 100 MG capsule  Commonly known as: NEURONTIN  What changed:   how much to take  how to take this  when to take this  additional instructions      TAKE 1 CAPSULE BY MOUTH IN THE MORNING AND 1 CAPSULE MID-DAY AND 2 CAPSULES AT NIGHT              CONTINUE taking these medications        Instructions Last Dose Given Next Dose Due   Aspirin Low Dose 81 MG chewable tablet  Generic drug: aspirin      Chew 1 tablet Daily.       atorvastatin 40 MG tablet  Commonly known as: LIPITOR      Take 1 tablet by mouth Every Night.       azelastine 0.05 % ophthalmic solution  Commonly known as: OPTIVAR           CALCIUM 1200 PO      Take  by mouth.       Calcium Citrate-Vitamin D 500-10 MG-MCG chewable tablet      Every 12 (Twelve) Hours.       carvedilol 25 MG  tablet  Commonly known as: COREG      Take 1 tablet by mouth 2 (Two) Times a Day.       cetirizine 10 MG tablet  Commonly known as: zyrTEC      Take 1 tablet by mouth As Needed.       cloNIDine 0.1 MG tablet  Commonly known as: Catapres      Take 1 tablet by mouth Daily As Needed for High Blood Pressure (for BP that is persistently elevated >200/100).       cyclobenzaprine 5 MG tablet  Commonly known as: FLEXERIL      Take 1 tablet by mouth Every Night.       diclofenac 50 MG EC tablet  Commonly known as: VOLTAREN      Take 1 tablet by mouth Every 12 (Twelve) Hours.       dicyclomine 20 MG tablet  Commonly known as: BENTYL      Take 1 tablet by mouth As Needed.       hydroCHLOROthiazide 25 MG tablet      TAKE 1 TABLET BY MOUTH EVERY DAY       HYDROcodone-acetaminophen 7.5-325 MG per tablet  Commonly known as: NORCO      Take 1 tablet by mouth Every 6 (Six) Hours As Needed for Moderate Pain.       metoclopramide 5 MG tablet  Commonly known as: REGLAN      Take 1 tablet by mouth 3 (Three) Times a Day As Needed (abd pain).       omeprazole 20 MG capsule  Commonly known as: priLOSEC      Take 2 capsules by mouth As Needed.       Prolia 60 MG/ML solution prefilled syringe syringe  Generic drug: denosumab      Inject 1 mL under the skin into the appropriate area as directed Every 6 (Six) Months.       valsartan 160 MG tablet  Commonly known as: DIOVAN      Take 0.5 tablets by mouth every night at bedtime.       Vitamin D3 10 MCG (400 UNIT) capsule      Daily.                  As always, it has been a pleasure to participate in your patient's care.      Sincerely,     Tg MAHMOOD